# Patient Record
Sex: FEMALE | Race: WHITE | NOT HISPANIC OR LATINO | Employment: FULL TIME | URBAN - METROPOLITAN AREA
[De-identification: names, ages, dates, MRNs, and addresses within clinical notes are randomized per-mention and may not be internally consistent; named-entity substitution may affect disease eponyms.]

---

## 2021-04-16 ENCOUNTER — BULK ORDERING (OUTPATIENT)
Dept: CASE MANAGEMENT | Facility: OTHER | Age: 31
End: 2021-04-16

## 2021-04-16 DIAGNOSIS — Z23 IMMUNIZATION DUE: ICD-10-CM

## 2021-04-29 ENCOUNTER — OFFICE VISIT (OUTPATIENT)
Dept: OBSTETRICS AND GYNECOLOGY | Facility: CLINIC | Age: 31
End: 2021-04-29

## 2021-04-29 VITALS
SYSTOLIC BLOOD PRESSURE: 150 MMHG | HEIGHT: 61 IN | WEIGHT: 120.2 LBS | DIASTOLIC BLOOD PRESSURE: 98 MMHG | BODY MASS INDEX: 22.69 KG/M2

## 2021-04-29 DIAGNOSIS — N64.4 MASTALGIA IN FEMALE: Primary | ICD-10-CM

## 2021-04-29 LAB
B-HCG UR QL: NEGATIVE
INTERNAL NEGATIVE CONTROL: NEGATIVE
INTERNAL POSITIVE CONTROL: POSITIVE
Lab: NORMAL

## 2021-04-29 PROCEDURE — 81025 URINE PREGNANCY TEST: CPT | Performed by: OBSTETRICS & GYNECOLOGY

## 2021-04-29 PROCEDURE — 99203 OFFICE O/P NEW LOW 30 MIN: CPT | Performed by: OBSTETRICS & GYNECOLOGY

## 2021-04-29 NOTE — PROGRESS NOTES
20-minute face-to-face discussion of a 20-minute visit.  Patient is a former patient of mine who I last saw in 2019.  She had a missed spontaneous  at that time.  She is been trying to conceive and recently came off of [place].  She had her first spontaneous menses off of the birth control pill that started on 3/28/2021.  She has had breast tenderness bloating and feels general pregnancy symptoms and is concerned that she may be trying to have another miscarriage.  The urine hCG was negative today.  I explained to her the sensitivity of serum hCG versus urine hCG and she very much wishes to confirm the negative test with a serum hCG.  If is positive we will also check progesterone levels and do serial hCGs as indicated.  She will give her cycle another week to start on its own.  Discussed with her to the possibility of a corpus luteum cyst causing discomfort in her left side that she reports.

## 2021-04-30 LAB — HCG INTACT+B SERPL-ACNC: <0.5 MIU/ML

## 2021-04-30 NOTE — PROGRESS NOTES
Patient wants to know if she can possibly have UTI, now she is having lower left side pain and frequent urination.   I informed patient she could come in and just leave a urine for culture.

## 2021-05-12 ENCOUNTER — LAB (OUTPATIENT)
Dept: OBSTETRICS AND GYNECOLOGY | Facility: CLINIC | Age: 31
End: 2021-05-12

## 2021-05-12 DIAGNOSIS — N64.4 MASTALGIA IN FEMALE: ICD-10-CM

## 2021-05-12 DIAGNOSIS — N64.4 MASTALGIA IN FEMALE: Primary | ICD-10-CM

## 2021-05-13 LAB — HCG INTACT+B SERPL-ACNC: 345.5 MIU/ML

## 2021-05-18 ENCOUNTER — INITIAL PRENATAL (OUTPATIENT)
Dept: OBSTETRICS AND GYNECOLOGY | Facility: CLINIC | Age: 31
End: 2021-05-18

## 2021-05-18 VITALS — DIASTOLIC BLOOD PRESSURE: 82 MMHG | SYSTOLIC BLOOD PRESSURE: 140 MMHG | BODY MASS INDEX: 23.58 KG/M2 | WEIGHT: 124.8 LBS

## 2021-05-18 DIAGNOSIS — Z34.91 UNCERTAIN DATES, ANTEPARTUM, FIRST TRIMESTER: ICD-10-CM

## 2021-05-18 DIAGNOSIS — Z34.91 INITIAL OBSTETRIC VISIT IN FIRST TRIMESTER: ICD-10-CM

## 2021-05-18 DIAGNOSIS — Z34.91 ENCOUNTER FOR PREGNANCY RELATED EXAMINATION IN FIRST TRIMESTER: Primary | ICD-10-CM

## 2021-05-18 LAB
GLUCOSE UR STRIP-MCNC: NEGATIVE MG/DL
PROT UR STRIP-MCNC: NEGATIVE MG/DL

## 2021-05-18 PROCEDURE — 99214 OFFICE O/P EST MOD 30 MIN: CPT | Performed by: OBSTETRICS & GYNECOLOGY

## 2021-05-18 RX ORDER — ONDANSETRON 4 MG/1
4 TABLET, FILM COATED ORAL EVERY 8 HOURS PRN
Qty: 30 TABLET | Refills: 3 | Status: SHIPPED | OUTPATIENT
Start: 2021-05-18 | End: 2022-02-23

## 2021-05-18 RX ORDER — CHOLECALCIFEROL (VITAMIN D3) 50 MCG
1 TABLET ORAL DAILY
Qty: 30 EACH | Refills: 12 | Status: SHIPPED | OUTPATIENT
Start: 2021-05-18 | End: 2023-02-22

## 2021-05-18 NOTE — PROGRESS NOTES
Initial ob visit     CC- Here to initiate prenatal care.    Isabella Christiansen is being seen today for her first obstetrical visit.  She is a 31 y.o. .  She was seen on  after having had a positive home pregnancy test but we did a confirmatory serum hCG and it was negative.  She called back after having not started her menses, and having another positive test a couple weeks later and on 2021 her quantitative hCG was 345.  She is having routine pregnancy symptoms and no bleeding.  She is having nausea and breast tenderness.   Unknown gestation.     # 1 - Date: 07, Sex: Female, Weight: 3204 g (7 lb 1 oz), GA: None, Delivery: , Unspecified, Apgar1: None, Apgar5: None, Living: Living, Birth Comments: None    # 2 - Date: 01/22/10, Sex: Male, Weight: 2608 g (5 lb 12 oz), GA: None, Delivery: , Unspecified, Apgar1: None, Apgar5: None, Living: Living, Birth Comments: None    # 3 - Date: 2019, Sex: None, Weight: None, GA: None, Delivery: None, Apgar1: None, Apgar5: None, Living: None, Birth Comments: None    # 4 - Date: None, Sex: None, Weight: None, GA: None, Delivery: None, Apgar1: None, Apgar5: None, Living: None, Birth Comments: None      Current obstetric complaints : None      Prior obstetric issues, potential pregnancy concerns: 2 prior  sections  Family history of genetic issues (includes FOB): None reported  Prior infections concerning in pregnancy (Rash, fever in last 2 weeks): None reported  Varicella Hx -uncertain  Prior testing for Cystic Fibrosis Carrier or Sickle Cell Trait-none reported  Prepregnancy BMI - Body mass index is 23.58 kg/m².  Hx of HSV for patient or partner : None reported    Past Medical History:   Diagnosis Date   • Asthma        Past Surgical History:   Procedure Laterality Date   • BREAST AUGMENTATION     •  SECTION         No current outpatient medications on file.    Allergies   Allergen Reactions   • Lisinopril Other (See Comments)      shakes   • Sulfamethoxazole-Trimethoprim Other (See Comments)     Migraine   • Ibuprofen Rash   • Morphine Rash     Red and itching        Social History     Socioeconomic History   • Marital status:      Spouse name: Not on file   • Number of children: Not on file   • Years of education: Not on file   • Highest education level: Not on file   Tobacco Use   • Smoking status: Current Some Day Smoker   • Smokeless tobacco: Never Used   Vaping Use   • Vaping Use: Never used   Substance and Sexual Activity   • Alcohol use: Not Currently   • Drug use: Never   • Sexual activity: Yes     Partners: Male     Birth control/protection: None       Family History   Problem Relation Age of Onset   • Diabetes Father    • Hypertension Father        Review of systems     Constitutional : Nausea, fatigue present   : Vaginal bleeding, cramping negative  Breast Tenderness : Positive  All other systems reviewed and are negative       Objective    /82   Wt 56.6 kg (124 lb 12.8 oz)   LMP 03/28/2021   BMI 23.58 kg/m²       General Appearance:    Alert, cooperative, in no acute distress, habitus normal   Head:    Normocephalic, without obvious abnormality, atraumatic   Eyes:            Lids and lashes normal, conjunctivae and sclerae normal, no   icterus, no pallor, corneas clear   Ears:    Ears appear intact with no abnormalities noted       Neck:   no thyromegaly, no mass.   Back:                         Lungs:     Clear to auscultation,respirations regular, even and     unlabored    Heart:    Regular rhythm and normal rate, normal S1 and S2, no            murmur, no gallop, no rub, no click   Breast Exam:    No masses, No nipple discharge   Abdomen:     Normal bowel sounds, no masses, no organomegaly, soft        non-tender, non-distended, no guarding, no rebound                 tenderness   Genitalia:    Vulva - BUS-WNL, NEFG    Vagina - No discharge, No bleeding    Cervix - No Lesions, closed     Uterus - Consistent  with 6 weeks    Adnexa - No mass, NT    Pelvimetry - clinically adequate, gynecoid pelvis     Extremities:   Moves all extremities well, no edema, no cyanosis, no              redness   Pulses:   Pulses palpable and equal bilaterally   Skin:   No bleeding, bruising or rash   Lymph nodes:   No palpable adenopathy   Neurologic:   Sensation intact, A&O times 3      Assessment & Plan     Diagnoses and all orders for this visit:    1. Encounter for pregnancy related examination in first trimester (Primary)  -     Urine Culture - Urine, Urine, Clean Catch  -     NuSwab VG+ - Swab, Vagina  -     OB Panel With HIV  -     Inheritest Core(CF97,SMA,FraX) - Blood,  -     Drug Profile Urine - 9 Drugs - Urine, Clean Catch    2. Initial obstetric visit in first trimester    3. Uncertain dates, antepartum, first trimester  -     US Ob Transvaginal; Future        1) Pregnancy at Unknown gestational age.  Given her unusual menstrual history with negative hCGs and then early positive hCG will repeat a quantitative hCG today and plan for an ultrasound in 2 days.  2) prior  section x2.  We will plan for repeat          Activity recommendation : 150 minutes/week of moderate intensity aerobic activity unless we limit for bleeding, hypertension or other pregnancy complication   Patient is on Prenatal vitamins  Problem list reviewed and updated.  Reviewed routine prenatal care with the office to include but not limited to   Zika (travel restrictions/ok to use insect repellant), not to changing cat litter, food restrictions, avoidance of alcohol, tobacco and drugs and saunas/hot tubs.   All questions answered.     Jeffery Cooper MD   2021  15:37 EDT

## 2021-05-19 LAB
ABO GROUP BLD: ABNORMAL
AMPHETAMINES UR QL SCN: NEGATIVE NG/ML
BARBITURATES UR QL SCN: NEGATIVE NG/ML
BASOPHILS # BLD AUTO: 0 X10E3/UL (ref 0–0.2)
BASOPHILS NFR BLD AUTO: 0 %
BENZODIAZ UR QL: NEGATIVE NG/ML
BLD GP AB SCN SERPL QL: NEGATIVE
BZE UR QL: NEGATIVE NG/ML
CANNABINOIDS UR QL SCN: NEGATIVE NG/ML
EOSINOPHIL # BLD AUTO: 0.2 X10E3/UL (ref 0–0.4)
EOSINOPHIL NFR BLD AUTO: 2 %
ERYTHROCYTE [DISTWIDTH] IN BLOOD BY AUTOMATED COUNT: 11.6 % (ref 11.7–15.4)
HBV SURFACE AG SERPL QL IA: NEGATIVE
HCT VFR BLD AUTO: 45.5 % (ref 34–46.6)
HCV AB S/CO SERPL IA: <0.1 S/CO RATIO (ref 0–0.9)
HGB BLD-MCNC: 15.6 G/DL (ref 11.1–15.9)
HIV 1+2 AB+HIV1 P24 AG SERPL QL IA: NON REACTIVE
IMM GRANULOCYTES # BLD AUTO: 0.1 X10E3/UL (ref 0–0.1)
IMM GRANULOCYTES NFR BLD AUTO: 1 %
LYMPHOCYTES # BLD AUTO: 2.5 X10E3/UL (ref 0.7–3.1)
LYMPHOCYTES NFR BLD AUTO: 27 %
MCH RBC QN AUTO: 30.4 PG (ref 26.6–33)
MCHC RBC AUTO-ENTMCNC: 34.3 G/DL (ref 31.5–35.7)
MCV RBC AUTO: 89 FL (ref 79–97)
METHADONE UR QL SCN: NEGATIVE NG/ML
MONOCYTES # BLD AUTO: 0.8 X10E3/UL (ref 0.1–0.9)
MONOCYTES NFR BLD AUTO: 8 %
NEUTROPHILS # BLD AUTO: 5.7 X10E3/UL (ref 1.4–7)
NEUTROPHILS NFR BLD AUTO: 62 %
OPIATES UR QL: NEGATIVE NG/ML
PCP UR QL: NEGATIVE NG/ML
PLATELET # BLD AUTO: 336 X10E3/UL (ref 150–450)
PROPOXYPH UR QL SCN: NEGATIVE NG/ML
RBC # BLD AUTO: 5.13 X10E6/UL (ref 3.77–5.28)
RH BLD: POSITIVE
RPR SER QL: NON REACTIVE
RUBV IGG SERPL IA-ACNC: 14.3 INDEX
WBC # BLD AUTO: 9.3 X10E3/UL (ref 3.4–10.8)

## 2021-05-20 LAB
BACTERIA UR CULT: NO GROWTH
BACTERIA UR CULT: NORMAL
HCG INTACT+B SERPL-ACNC: 3183 MIU/ML

## 2021-05-21 ENCOUNTER — TELEPHONE (OUTPATIENT)
Dept: OBSTETRICS AND GYNECOLOGY | Facility: CLINIC | Age: 31
End: 2021-05-21

## 2021-05-21 LAB
A VAGINAE DNA VAG QL NAA+PROBE: ABNORMAL SCORE
BVAB2 DNA VAG QL NAA+PROBE: ABNORMAL SCORE
C ALBICANS DNA VAG QL NAA+PROBE: NEGATIVE
C GLABRATA DNA VAG QL NAA+PROBE: NEGATIVE
C TRACH DNA VAG QL NAA+PROBE: NEGATIVE
HCG INTACT+B SERPL-ACNC: NORMAL MIU/ML
MEGA1 DNA VAG QL NAA+PROBE: ABNORMAL SCORE
N GONORRHOEA DNA VAG QL NAA+PROBE: NEGATIVE
T VAGINALIS DNA VAG QL NAA+PROBE: NEGATIVE

## 2021-05-24 ENCOUNTER — TELEPHONE (OUTPATIENT)
Dept: OBSTETRICS AND GYNECOLOGY | Facility: CLINIC | Age: 31
End: 2021-05-24

## 2021-05-24 LAB — SPECIMEN STATUS: NORMAL

## 2021-05-24 NOTE — TELEPHONE ENCOUNTER
Good morning,  Pt is calling and states that the swelling in her feet and ankles that has gotten a lot  worse while she's working, wasn't sure if there was anything you recommend to help with that. Please advise.  Thank you,  Rosamaria

## 2021-05-24 NOTE — TELEPHONE ENCOUNTER
Spoke with patient regarding her swelling. Patient to increase fluid intake and elevated feet when possible. She has an appt on Friday.

## 2021-06-03 ENCOUNTER — ROUTINE PRENATAL (OUTPATIENT)
Dept: OBSTETRICS AND GYNECOLOGY | Facility: CLINIC | Age: 31
End: 2021-06-03

## 2021-06-03 VITALS — DIASTOLIC BLOOD PRESSURE: 78 MMHG | SYSTOLIC BLOOD PRESSURE: 124 MMHG | BODY MASS INDEX: 23.81 KG/M2 | WEIGHT: 126 LBS

## 2021-06-03 DIAGNOSIS — Z3A.01 7 WEEKS GESTATION OF PREGNANCY: ICD-10-CM

## 2021-06-03 DIAGNOSIS — Z34.81 PRENATAL CARE, SUBSEQUENT PREGNANCY IN FIRST TRIMESTER: Primary | ICD-10-CM

## 2021-06-03 LAB
GLUCOSE UR STRIP-MCNC: NEGATIVE MG/DL
PROT UR STRIP-MCNC: NEGATIVE MG/DL

## 2021-06-03 PROCEDURE — 99212 OFFICE O/P EST SF 10 MIN: CPT | Performed by: OBSTETRICS & GYNECOLOGY

## 2021-06-03 NOTE — PROGRESS NOTES
CC: Prenatal follow-up visit.  Patient is doing well overall she had a few vagal episodes at work but denies any bleeding or cramping.  All of her lab work is reviewed and values are negative and her blood type is AB+.  She is still taking Zofran periodically.  Assessment/ plan: 7-week gestation.  We will plan visit in 4 weeks and listen for heart tones on that visit.  We will also do NIPT on that visit.

## 2021-06-04 LAB
CLINICAL INFO: NORMAL
ETHNIC BACKGROUND STATED: NORMAL
GENE MUT TESTED BLD/T: NORMAL
GENETIC COUNSELOR:: NORMAL
LAB DIRECTOR NAME PROVIDER: NORMAL
MOL DX INTERP BLD/T QL: NORMAL
REASON FOR REFERRAL (NARRATIVE): NORMAL
REF LAB TEST METHOD: NORMAL
SERVICE CMNT 02-IMP: NORMAL
SPECIMEN SOURCE: NORMAL
TEST PERFORMANCE INFO SPEC: NORMAL

## 2021-06-16 ENCOUNTER — TELEPHONE (OUTPATIENT)
Dept: OBSTETRICS AND GYNECOLOGY | Facility: CLINIC | Age: 31
End: 2021-06-16

## 2021-06-16 NOTE — TELEPHONE ENCOUNTER
Patient called and states that she has been having cramping for the last few days and sometimes it is more intense then others. She has had a miscarriage in past so in concerned wanted to know if that would be normal for her or should she come into office for evaluation?

## 2021-06-16 NOTE — TELEPHONE ENCOUNTER
Have patient drink lots of fluids, she can rest when possible.   If no bleeding keep her appointment on the 30th.

## 2021-06-29 ENCOUNTER — ROUTINE PRENATAL (OUTPATIENT)
Dept: OBSTETRICS AND GYNECOLOGY | Facility: CLINIC | Age: 31
End: 2021-06-29

## 2021-06-29 VITALS — BODY MASS INDEX: 23.85 KG/M2 | SYSTOLIC BLOOD PRESSURE: 120 MMHG | WEIGHT: 126.2 LBS | DIASTOLIC BLOOD PRESSURE: 62 MMHG

## 2021-06-29 DIAGNOSIS — Z3A.10 10 WEEKS GESTATION OF PREGNANCY: Primary | ICD-10-CM

## 2021-06-29 DIAGNOSIS — Z34.81 PRENATAL CARE, SUBSEQUENT PREGNANCY IN FIRST TRIMESTER: ICD-10-CM

## 2021-06-29 LAB
GLUCOSE UR STRIP-MCNC: NEGATIVE MG/DL
PROT UR STRIP-MCNC: NEGATIVE MG/DL

## 2021-06-29 PROCEDURE — 99213 OFFICE O/P EST LOW 20 MIN: CPT | Performed by: OBSTETRICS & GYNECOLOGY

## 2021-06-29 NOTE — PROGRESS NOTES
OB follow up     Isabella Christiansen is a 31 y.o.  10w6d being seen today for her obstetrical visit.  Patient reports no complaints and Some uterine cramping but no bleeding.. Fetal movement: To early.    Her prenatal care is complicated by (and status): Prior  x2    Review of Systems  Cramping/contractions : Mild cramping noted  Vaginal bleeding: Negative  Fetal movement too early    /62   Wt 57.2 kg (126 lb 3.2 oz)   LMP 2021   BMI 23.85 kg/m²     FHT: 170 BPM   Uterine Size: size equals dates       Assessment    Diagnoses and all orders for this visit:    1. 10 weeks gestation of pregnancy (Primary)  -     [id number] PLUS Core - Blood,    2. Prenatal care, subsequent pregnancy in first trimester        1) pregnancy at 10w6d   2) prior  and plan for repeat .  Will get NIPT today.        Plan    Reviewed this stage of pregnancy  Problem list updated   Follow up in 4 weeks    Jeffery Cooper MD   2021  15:14 EDT

## 2021-07-09 ENCOUNTER — TELEPHONE (OUTPATIENT)
Dept: OBSTETRICS AND GYNECOLOGY | Facility: CLINIC | Age: 31
End: 2021-07-09

## 2021-07-09 LAB
CFDNA.FET/CFDNA.TOTAL SFR FETUS: NORMAL %
CITATION REF LAB TEST: NORMAL
FET 13+18+21+X+Y ANEUP PLAS.CFDNA: NEGATIVE
FET CHR 21 TS PLAS.CFDNA QL: NEGATIVE
FET SEX PLAS.CFDNA DOSAGE CFDNA: NORMAL
FET TS 13 RISK PLAS.CFDNA QL: NEGATIVE
FET TS 18 RISK WBC.DNA+CFDNA QL: NEGATIVE
GA EST FROM CONCEPTION DATE: NORMAL D
GESTATIONAL AGE > 9:: YES
LAB DIRECTOR NAME PROVIDER: NORMAL
LAB DIRECTOR NAME PROVIDER: NORMAL
LABORATORY COMMENT REPORT: NORMAL
LIMITATIONS OF THE TEST: NORMAL
NEGATIVE PREDICTIVE VALUE: NORMAL
NOTE: NORMAL
PERFORMANCE CHARACTERISTICS: NORMAL
POSITIVE PREDICTIVE VALUE: NORMAL
REF LAB TEST METHOD: NORMAL
TEST PERFORMANCE INFO SPEC: NORMAL

## 2021-07-09 NOTE — TELEPHONE ENCOUNTER
Patient returned call, Haley wanted to know if her lab results were in. Informed patient that results are not back yet and this can take up to 2 weeks.

## 2021-07-09 NOTE — TELEPHONE ENCOUNTER
Patient called in regards to lab results and wants a call back. She would not share any further information with me.

## 2021-07-27 ENCOUNTER — ROUTINE PRENATAL (OUTPATIENT)
Dept: OBSTETRICS AND GYNECOLOGY | Facility: CLINIC | Age: 31
End: 2021-07-27

## 2021-07-27 VITALS — DIASTOLIC BLOOD PRESSURE: 70 MMHG | BODY MASS INDEX: 24.53 KG/M2 | SYSTOLIC BLOOD PRESSURE: 132 MMHG | WEIGHT: 129.8 LBS

## 2021-07-27 DIAGNOSIS — Z3A.14 14 WEEKS GESTATION OF PREGNANCY: ICD-10-CM

## 2021-07-27 DIAGNOSIS — Z34.82 PRENATAL CARE, SUBSEQUENT PREGNANCY IN SECOND TRIMESTER: Primary | ICD-10-CM

## 2021-07-27 LAB
GLUCOSE UR STRIP-MCNC: NEGATIVE MG/DL
PROT UR STRIP-MCNC: NEGATIVE MG/DL

## 2021-07-27 PROCEDURE — 99212 OFFICE O/P EST SF 10 MIN: CPT | Performed by: OBSTETRICS & GYNECOLOGY

## 2021-07-27 NOTE — PROGRESS NOTES
OB follow up     Isabella Christiansen is a 31 y.o.  14w6d being seen today for her obstetrical visit.  Patient reports no complaints. Fetal movement: normal.    Her prenatal care is complicated by (and status): Prior  x2    Review of Systems  Cramping/contractions : Negative  Vaginal bleeding: Not reported  Fetal movement early but present    /70   Wt 58.9 kg (129 lb 12.8 oz)   LMP 2021   BMI 24.53 kg/m²     FHT:  158 BPM   Uterine Size: size equals dates       Assessment    Diagnoses and all orders for this visit:    1. Prenatal care, subsequent pregnancy in second trimester (Primary)    2. 14 weeks gestation of pregnancy        1) pregnancy at 14w6d.  NIPT normal and reviewed with patient.  We will plan visit in 4 weeks and then plan her ultrasound a little after that visit.  2) inconsistent evidence of mild hypertension with no proteinuria.  Discussed with patient and will follow carefully through the pregnancy.        Plan    Reviewed this stage of pregnancy  Problem list updated   Follow up in 4 weeks    Jeffery Cooper MD   2021  09:28 EDT

## 2021-08-26 ENCOUNTER — ROUTINE PRENATAL (OUTPATIENT)
Dept: OBSTETRICS AND GYNECOLOGY | Facility: CLINIC | Age: 31
End: 2021-08-26

## 2021-08-26 VITALS — BODY MASS INDEX: 24.94 KG/M2 | WEIGHT: 132 LBS | DIASTOLIC BLOOD PRESSURE: 81 MMHG | SYSTOLIC BLOOD PRESSURE: 122 MMHG

## 2021-08-26 DIAGNOSIS — Z98.891 HISTORY OF C-SECTION: ICD-10-CM

## 2021-08-26 DIAGNOSIS — Z34.82 PRENATAL CARE, SUBSEQUENT PREGNANCY IN SECOND TRIMESTER: Primary | ICD-10-CM

## 2021-08-26 DIAGNOSIS — Z3A.19 19 WEEKS GESTATION OF PREGNANCY: ICD-10-CM

## 2021-08-26 LAB
GLUCOSE UR STRIP-MCNC: NEGATIVE MG/DL
PROT UR STRIP-MCNC: NEGATIVE MG/DL

## 2021-08-26 PROCEDURE — 99212 OFFICE O/P EST SF 10 MIN: CPT | Performed by: OBSTETRICS & GYNECOLOGY

## 2021-08-26 NOTE — PROGRESS NOTES
OB follow up     Isabella Christiansen is a 31 y.o.  19w1d being seen today for her obstetrical visit.  Patient reports no complaints. Fetal movement: normal.    Her prenatal care is complicated by (and status): 2 prior  sections    Review of Systems  Cramping/contractions : Negative  Vaginal bleeding: None reported  Fetal movement normal    /81   Wt 59.9 kg (132 lb)   LMP 2021   BMI 24.94 kg/m²     FHT:  150s BPM   Uterine Size: size equals dates       Assessment    Diagnoses and all orders for this visit:    1. Prenatal care, subsequent pregnancy in second trimester (Primary)    2. 19 weeks gestation of pregnancy    3. History of         1) pregnancy at 19w1d   2) prior  x2.  Posterior low-lying placenta seen on ultrasound will repeat on later ultrasound.  Plan is for repeat .  Covid vaccine discussed and patient is reluctant to get this as she had Covid in 2020.        Plan    Reviewed this stage of pregnancy  Problem list updated   Follow up in 5 weeks.  We will get 1 hour glucose on next visit.    Jeffery Cooper MD   2021  11:22 EDT

## 2021-08-31 ENCOUNTER — TELEPHONE (OUTPATIENT)
Dept: OBSTETRICS AND GYNECOLOGY | Facility: CLINIC | Age: 31
End: 2021-08-31

## 2021-08-31 RX ORDER — ALBUTEROL SULFATE 2.5 MG/3ML
2.5 SOLUTION RESPIRATORY (INHALATION) EVERY 4 HOURS PRN
Qty: 3 ML | Refills: 3 | OUTPATIENT
Start: 2021-08-31 | End: 2021-09-01

## 2021-08-31 RX ORDER — AZITHROMYCIN 250 MG/1
TABLET, FILM COATED ORAL
Qty: 4 TABLET | Refills: 0 | Status: SHIPPED | OUTPATIENT
Start: 2021-08-31 | End: 2021-09-05

## 2021-08-31 NOTE — TELEPHONE ENCOUNTER
Dr. Cooper,    I do not see an inhaler on Haley's med list. Would you like to call her in an antibiotic for her ear pain and also an inhaler? I will be giving her a call this afternoon.    Yuridia

## 2021-09-20 ENCOUNTER — TELEPHONE (OUTPATIENT)
Dept: OBSTETRICS AND GYNECOLOGY | Facility: CLINIC | Age: 31
End: 2021-09-20

## 2021-09-20 NOTE — TELEPHONE ENCOUNTER
Patient called and states that she was suppose to come in today for a follow up because she fell the other day. She states she cant make it at time provided she can only come in at 4:30 or later and I informed her we dont see patients that late and she wants to get a call back on what she should do.

## 2021-09-20 NOTE — TELEPHONE ENCOUNTER
Patient states, just slipped on hardwood floor yesterday, caught self so went down on knees. Patient states baby is not very active since she fell. Wants to know what what she should do? States movements are once every 3 to 4 hours.

## 2021-09-21 ENCOUNTER — ROUTINE PRENATAL (OUTPATIENT)
Dept: OBSTETRICS AND GYNECOLOGY | Facility: CLINIC | Age: 31
End: 2021-09-21

## 2021-09-21 VITALS — SYSTOLIC BLOOD PRESSURE: 126 MMHG | WEIGHT: 138.2 LBS | DIASTOLIC BLOOD PRESSURE: 70 MMHG | BODY MASS INDEX: 26.11 KG/M2

## 2021-09-21 DIAGNOSIS — Z98.891 HISTORY OF C-SECTION: ICD-10-CM

## 2021-09-21 DIAGNOSIS — Z34.82 PRENATAL CARE, SUBSEQUENT PREGNANCY IN SECOND TRIMESTER: Primary | ICD-10-CM

## 2021-09-21 DIAGNOSIS — Z3A.22 22 WEEKS GESTATION OF PREGNANCY: ICD-10-CM

## 2021-09-21 LAB
GLUCOSE UR STRIP-MCNC: NEGATIVE MG/DL
PROT UR STRIP-MCNC: NEGATIVE MG/DL

## 2021-09-21 PROCEDURE — 99212 OFFICE O/P EST SF 10 MIN: CPT | Performed by: OBSTETRICS & GYNECOLOGY

## 2021-09-21 NOTE — PROGRESS NOTES
Patient had a fall, caught herself and fell on knees. Has been worried since then due to baby not moving as much.

## 2021-09-21 NOTE — PROGRESS NOTES
OB follow up     Isabella Christiansen is a 31 y.o.  22w6d being seen today for her obstetrical visit.  Patient reports no complaints. Fetal movement: decreased.  Patient states that she had a fall onto her knees after slipping at home for 5 days ago and was concerned and called the office yesterday that there was decreased movement.  She denies any bleeding or direct trauma onto the uterus.  She denies loss of fluid that would be concerning for amniotic fluid.  Covid vaccine encouraged per ACOG and CDC recommendations.    Her prenatal care is complicated by (and status): Prior  x2    Review of Systems  Cramping/contractions : None reported  Vaginal bleeding: None reported  Fetal movement is normal for 22 weeks    /70   Wt 62.7 kg (138 lb 3.2 oz)   LMP 2021   BMI 26.11 kg/m²     FHT:  150s BPM   Uterine Size: size equals dates       Assessment    Diagnoses and all orders for this visit:    1. Prenatal care, subsequent pregnancy in second trimester (Primary)    2. 22 weeks gestation of pregnancy        1) pregnancy at 22w6d   2) prior  section x2.  We will plan for repeat         Plan    Reviewed this stage of pregnancy  Problem list updated   Follow up in 2 weeks at her next scheduled visit for her 1 hour glucose.  Will move to light duty at work.    Jeffery Cooper MD   2021  09:43 EDT

## 2021-09-30 ENCOUNTER — ROUTINE PRENATAL (OUTPATIENT)
Dept: OBSTETRICS AND GYNECOLOGY | Facility: CLINIC | Age: 31
End: 2021-09-30

## 2021-09-30 VITALS — BODY MASS INDEX: 25.7 KG/M2 | WEIGHT: 136 LBS | DIASTOLIC BLOOD PRESSURE: 70 MMHG | SYSTOLIC BLOOD PRESSURE: 138 MMHG

## 2021-09-30 DIAGNOSIS — R30.0 DYSURIA: Primary | ICD-10-CM

## 2021-09-30 DIAGNOSIS — Z3A.24 24 WEEKS GESTATION OF PREGNANCY: ICD-10-CM

## 2021-09-30 DIAGNOSIS — N30.01 ACUTE CYSTITIS WITH HEMATURIA: ICD-10-CM

## 2021-09-30 DIAGNOSIS — Z98.891 HISTORY OF C-SECTION: ICD-10-CM

## 2021-09-30 DIAGNOSIS — Z34.82 PRENATAL CARE, SUBSEQUENT PREGNANCY IN SECOND TRIMESTER: ICD-10-CM

## 2021-09-30 LAB
BILIRUB BLD-MCNC: NEGATIVE MG/DL
CLARITY, POC: ABNORMAL
COLOR UR: YELLOW
GLUCOSE 1H P 50 G GLC PO SERPL-MCNC: 91 MG/DL (ref 65–139)
GLUCOSE UR STRIP-MCNC: NEGATIVE MG/DL
GLUCOSE UR STRIP-MCNC: NEGATIVE MG/DL
HCT VFR BLD AUTO: 39.6 % (ref 34–46.6)
HGB BLD-MCNC: 13.3 G/DL (ref 12–15.9)
KETONES UR QL: NEGATIVE
LEUKOCYTE EST, POC: ABNORMAL
NITRITE UR-MCNC: NEGATIVE MG/ML
PH UR: 7.5 [PH] (ref 5–8)
PROT UR STRIP-MCNC: ABNORMAL MG/DL
PROT UR STRIP-MCNC: ABNORMAL MG/DL
RBC # UR STRIP: ABNORMAL /UL
SP GR UR: 1.02 (ref 1–1.03)
UROBILINOGEN UR QL: NORMAL

## 2021-09-30 PROCEDURE — 99214 OFFICE O/P EST MOD 30 MIN: CPT | Performed by: OBSTETRICS & GYNECOLOGY

## 2021-09-30 RX ORDER — NITROFURANTOIN 25; 75 MG/1; MG/1
100 CAPSULE ORAL 2 TIMES DAILY
Qty: 14 CAPSULE | Refills: 0 | Status: SHIPPED | OUTPATIENT
Start: 2021-09-30 | End: 2021-10-07

## 2021-09-30 NOTE — PROGRESS NOTES
OB follow up     Isabella Christiansen is a 31 y.o.  24w1d being seen today for her obstetrical visit.  Patient reports Dysuria. Fetal movement: normal.    Her prenatal care is complicated by (and status): Prior  x2    Review of Systems  Cramping/contractions : None reported  Vaginal bleeding: Negative  Fetal movement negative  Positive for dysuria  /70   Wt 61.7 kg (136 lb)   LMP 2021   BMI 25.70 kg/m²     FHT: 140s BPM   Uterine Size: size equals dates       Assessment    Diagnoses and all orders for this visit:    1. Dysuria (Primary)  -     POC Urinalysis Dipstick  -     Urine Culture - Urine, Urine, Clean Catch    2. Prenatal care, subsequent pregnancy in second trimester    3. 24 weeks gestation of pregnancy  -     Gestational Screen 1 Hr ([facility])  -     Hemoglobin & Hematocrit, Blood    4. History of     5. Acute cystitis with hematuria    Other orders  -     nitrofurantoin, macrocrystal-monohydrate, (Macrobid) 100 MG capsule; Take 1 capsule by mouth 2 (Two) Times a Day for 7 days.  Dispense: 14 capsule; Refill: 0        1) pregnancy at 24w1d         Plan    Reviewed this stage of pregnancy  Problem list updated   Follow up in 4 weeks.  1 hour glucose today.  Will prescribe Macrobid for acute cystitis.    Jeffery Cooper MD   2021  10:11 EDT

## 2021-10-02 LAB
BACTERIA UR CULT: NORMAL
BACTERIA UR CULT: NORMAL

## 2021-10-23 ENCOUNTER — TELEPHONE (OUTPATIENT)
Dept: OBSTETRICS AND GYNECOLOGY | Facility: CLINIC | Age: 31
End: 2021-10-23

## 2021-10-23 NOTE — TELEPHONE ENCOUNTER
"Patient called stating that she had \"severe cramping\" overnight, went to sleep and woke up with back pain and 4/10 lower abdominal cramping, noted some blood when wiping. She recently finished Rx for Macrobid.  She has a posterior low lying placenta. Notes normal fetal movements. About to go to work at [employer] and wondering if she should go to work and come in after. Recommend patient come in to triage for evaluation prior to going to work. She agrees.   "

## 2021-10-25 ENCOUNTER — ROUTINE PRENATAL (OUTPATIENT)
Dept: OBSTETRICS AND GYNECOLOGY | Facility: CLINIC | Age: 31
End: 2021-10-25

## 2021-10-25 ENCOUNTER — TELEPHONE (OUTPATIENT)
Dept: OBSTETRICS AND GYNECOLOGY | Facility: CLINIC | Age: 31
End: 2021-10-25

## 2021-10-25 VITALS — DIASTOLIC BLOOD PRESSURE: 72 MMHG | BODY MASS INDEX: 26.04 KG/M2 | SYSTOLIC BLOOD PRESSURE: 130 MMHG | WEIGHT: 137.8 LBS

## 2021-10-25 DIAGNOSIS — Z34.83 PRENATAL CARE, SUBSEQUENT PREGNANCY IN THIRD TRIMESTER: Primary | ICD-10-CM

## 2021-10-25 DIAGNOSIS — Z3A.27 27 WEEKS GESTATION OF PREGNANCY: ICD-10-CM

## 2021-10-25 DIAGNOSIS — Z98.891 HISTORY OF C-SECTION: ICD-10-CM

## 2021-10-25 LAB
GLUCOSE UR STRIP-MCNC: NEGATIVE MG/DL
PROT UR STRIP-MCNC: ABNORMAL MG/DL

## 2021-10-25 PROCEDURE — 99213 OFFICE O/P EST LOW 20 MIN: CPT | Performed by: OBSTETRICS & GYNECOLOGY

## 2021-10-25 NOTE — TELEPHONE ENCOUNTER
Called pharmacy to confirm that script is for inhaler, inhaler is correct and patient picked up on 9/1/21. I did give her 1 refill on inhaler today.     Left voicemail on Ms. Christiansen's voicemail.

## 2021-10-25 NOTE — TELEPHONE ENCOUNTER
Prescription was sent in on 9/1/21 and was for an inhaler. She needs to call her pharmacy and speak with them. If not, her pharmacy can call us.     Do you want me to call her?

## 2021-10-25 NOTE — PROGRESS NOTES
OB follow up     Isabella Christiansen is a 31 y.o.  27w5d being seen today for her obstetrical visit.  Patient reports Some lower extremity edema mainly associated during and after work. Fetal movement: normal.    Her prenatal care is complicated by (and status): Prior  section x2    Review of Systems  Cramping/contractions : None reported  Vaginal bleeding: Negative  Fetal movement normal and active    /72   Wt 62.5 kg (137 lb 12.8 oz)   LMP 2021   BMI 26.04 kg/m²     FHT:  140s BPM   Uterine Size: size equals dates       Assessment    Diagnoses and all orders for this visit:    1. Prenatal care, subsequent pregnancy in third trimester (Primary)    2. 27 weeks gestation of pregnancy    3. History of         1) pregnancy at 27w5d         Plan    Reviewed this stage of pregnancy  Problem list updated   Follow up in 3 weeks.  We will plan to see in 3 weeks and then 2 weeks after that with growth ultrasound.  Timing of  would be 39 weeks which would be on or after 2022.  I spent 20 minutes caring for Isabella on this date of service. This time includes time spent by me in the following activities: preparing for the visit, reviewing tests, obtaining and/or reviewing a separately obtained history, performing a medically appropriate examination and/or evaluation, counseling and educating the patient/family/caregiver and documenting information in the medical record      Jeffery Cooper MD   10/25/2021  15:42 EDT

## 2021-10-25 NOTE — TELEPHONE ENCOUNTER
Please if you don't mind. I have given her this information on two different days and she is saying that the RX is wrong.

## 2021-11-04 ENCOUNTER — ROUTINE PRENATAL (OUTPATIENT)
Dept: OBSTETRICS AND GYNECOLOGY | Facility: CLINIC | Age: 31
End: 2021-11-04

## 2021-11-04 ENCOUNTER — TELEPHONE (OUTPATIENT)
Dept: OBSTETRICS AND GYNECOLOGY | Facility: CLINIC | Age: 31
End: 2021-11-04

## 2021-11-04 VITALS — DIASTOLIC BLOOD PRESSURE: 68 MMHG | BODY MASS INDEX: 26.6 KG/M2 | SYSTOLIC BLOOD PRESSURE: 110 MMHG | WEIGHT: 140.8 LBS

## 2021-11-04 DIAGNOSIS — Z3A.29 29 WEEKS GESTATION OF PREGNANCY: ICD-10-CM

## 2021-11-04 DIAGNOSIS — Z34.83 PRENATAL CARE, SUBSEQUENT PREGNANCY IN THIRD TRIMESTER: Primary | ICD-10-CM

## 2021-11-04 DIAGNOSIS — Z98.891 HISTORY OF C-SECTION: ICD-10-CM

## 2021-11-04 LAB
GLUCOSE UR STRIP-MCNC: NEGATIVE MG/DL
PROT UR STRIP-MCNC: NEGATIVE MG/DL

## 2021-11-04 PROCEDURE — 99213 OFFICE O/P EST LOW 20 MIN: CPT | Performed by: OBSTETRICS & GYNECOLOGY

## 2021-11-04 NOTE — TELEPHONE ENCOUNTER
Good morning,  Patient is calling to inform MA and provider that she lost some clear fluid yesterday. Patient stated that it wasn't a whole lot, about a half dollar size. Patient also stated that she is having [place] contractions some are mild and a few are very strong. Patient denies active leaking of fluid, contractions, cramps or bleeding.   Patient would like to know if she should come into the office to be seen today or just monitor this.  Patient also stated that she would like a call back from the MA if possible because she has a few questions about being tired and unable to stay awake at times.      Please advise,  Thank you

## 2021-11-04 NOTE — PROGRESS NOTES
OB follow up     Isabella Christiansen is a 31 y.o.  29w1d being seen today for her obstetrical visit.  Patient reports no complaints. Fetal movement: normal.  She does report that she had an episode of fluid leakage yesterday but has had no continuous leaking since then.  She denies any bleeding.    Her prenatal care is complicated by (and status): Prior  x2    Review of Systems  Cramping/contractions : More noticeable recently  Vaginal bleeding: None  Fetal movement normal and active    /68   Wt 63.9 kg (140 lb 12.8 oz)   LMP 2021   BMI 26.60 kg/m²     FHT:  160 BPM   Uterine Size: size equals dates       Assessment    Diagnoses and all orders for this visit:    1. Prenatal care, subsequent pregnancy in third trimester (Primary)    2. 29 weeks gestation of pregnancy    3. History of         1) pregnancy at 29w1d.  No evidence of ruptured membranes on sterile speculum exam.  Discussed signs and symptoms of membrane rupture and can reevaluate as needed.  We will keep her next scheduled appointment.  I spent 20 minutes caring for Isabella on this date of service. This time includes time spent by me in the following activities: preparing for the visit, reviewing tests, obtaining and/or reviewing a separately obtained history, performing a medically appropriate examination and/or evaluation and documenting information in the medical record          Plan    Reviewed this stage of pregnancy  Problem list updated   Follow up in 3 weeks.  Sterile speculum exam done.    Jeffery Cooper MD   2021  11:24 EDT

## 2021-11-15 ENCOUNTER — ROUTINE PRENATAL (OUTPATIENT)
Dept: OBSTETRICS AND GYNECOLOGY | Facility: CLINIC | Age: 31
End: 2021-11-15

## 2021-11-15 VITALS — DIASTOLIC BLOOD PRESSURE: 62 MMHG | SYSTOLIC BLOOD PRESSURE: 110 MMHG | WEIGHT: 144.4 LBS | BODY MASS INDEX: 27.28 KG/M2

## 2021-11-15 DIAGNOSIS — Z3A.30 30 WEEKS GESTATION OF PREGNANCY: ICD-10-CM

## 2021-11-15 DIAGNOSIS — O99.333 MATERNAL TOBACCO USE IN THIRD TRIMESTER: ICD-10-CM

## 2021-11-15 DIAGNOSIS — Z34.83 PRENATAL CARE, SUBSEQUENT PREGNANCY IN THIRD TRIMESTER: Primary | ICD-10-CM

## 2021-11-15 LAB
GLUCOSE UR STRIP-MCNC: NEGATIVE MG/DL
PROT UR STRIP-MCNC: NEGATIVE MG/DL

## 2021-11-15 PROCEDURE — 99213 OFFICE O/P EST LOW 20 MIN: CPT | Performed by: OBSTETRICS & GYNECOLOGY

## 2021-11-15 NOTE — PROGRESS NOTES
OB follow up     Isabella Christiansen is a 31 y.o.  30w5d being seen today for her obstetrical visit.  Patient reports no complaints. Fetal movement: normal.  No bleeding and no other unusual discharge.  Normal fetal movement.  She did have some episodic contractions last evening but nothing that has persisted.    Her prenatal care is complicated by (and status): Prior  x2    Review of Systems  Cramping/contractions : Very occasional  Vaginal bleeding: None reported  Fetal movement normal    /62   Wt 65.5 kg (144 lb 6.4 oz)   LMP 2021   BMI 27.28 kg/m²     FHT:  150s BPM   Uterine Size: size equals dates       Assessment    Diagnoses and all orders for this visit:    1. Prenatal care, subsequent pregnancy in third trimester (Primary)    2. 30 weeks gestation of pregnancy  -     US Ob Follow Up Transabdominal Approach; Future    3. Maternal tobacco use in third trimester  -     US Ob Follow Up Transabdominal Approach; Future        1) pregnancy at 30w5d         Plan    Reviewed this stage of pregnancy  Problem list updated   Follow up in 2 weeks we will work on scheduling  for 2021.  We will plan for growth ultrasound on next visit.  I spent 20 minutes caring for Isabella on this date of service. This time includes time spent by me in the following activities: preparing for the visit, reviewing tests, obtaining and/or reviewing a separately obtained history, performing a medically appropriate examination and/or evaluation, counseling and educating the patient/family/caregiver, ordering medications, tests, or procedures and documenting information in the medical record      Jeffery Cooper MD   11/15/2021  11:03 EST

## 2021-11-22 ENCOUNTER — ROUTINE PRENATAL (OUTPATIENT)
Dept: OBSTETRICS AND GYNECOLOGY | Facility: CLINIC | Age: 31
End: 2021-11-22

## 2021-11-22 ENCOUNTER — TELEPHONE (OUTPATIENT)
Dept: OBSTETRICS AND GYNECOLOGY | Facility: CLINIC | Age: 31
End: 2021-11-22

## 2021-11-22 VITALS — WEIGHT: 145.2 LBS | SYSTOLIC BLOOD PRESSURE: 130 MMHG | BODY MASS INDEX: 27.44 KG/M2 | DIASTOLIC BLOOD PRESSURE: 72 MMHG

## 2021-11-22 DIAGNOSIS — Z98.891 H/O: C-SECTION: ICD-10-CM

## 2021-11-22 DIAGNOSIS — Z3A.31 31 WEEKS GESTATION OF PREGNANCY: ICD-10-CM

## 2021-11-22 DIAGNOSIS — Z34.83 PRENATAL CARE, SUBSEQUENT PREGNANCY IN THIRD TRIMESTER: Primary | ICD-10-CM

## 2021-11-22 LAB
GLUCOSE UR STRIP-MCNC: NEGATIVE MG/DL
PROT UR STRIP-MCNC: NEGATIVE MG/DL

## 2021-11-22 PROCEDURE — 99213 OFFICE O/P EST LOW 20 MIN: CPT | Performed by: OBSTETRICS & GYNECOLOGY

## 2021-11-22 NOTE — TELEPHONE ENCOUNTER
Patient called and wanted to know if you could call her back. She said she is leaking fluid but that has been going on for a month now so not as concerned as she said she is about she is having a lot more [place] and she wanted to know if she would need to come in today to see the doctor?

## 2021-11-30 ENCOUNTER — ROUTINE PRENATAL (OUTPATIENT)
Dept: OBSTETRICS AND GYNECOLOGY | Facility: CLINIC | Age: 31
End: 2021-11-30

## 2021-11-30 VITALS — SYSTOLIC BLOOD PRESSURE: 140 MMHG | WEIGHT: 149 LBS | DIASTOLIC BLOOD PRESSURE: 80 MMHG | BODY MASS INDEX: 28.15 KG/M2

## 2021-11-30 DIAGNOSIS — Z34.83 PRENATAL CARE, SUBSEQUENT PREGNANCY IN THIRD TRIMESTER: Primary | ICD-10-CM

## 2021-11-30 DIAGNOSIS — Z98.891 H/O: C-SECTION: ICD-10-CM

## 2021-11-30 DIAGNOSIS — Z3A.32 32 WEEKS GESTATION OF PREGNANCY: ICD-10-CM

## 2021-11-30 LAB
GLUCOSE UR STRIP-MCNC: NEGATIVE MG/DL
PROT UR STRIP-MCNC: NEGATIVE MG/DL

## 2021-11-30 PROCEDURE — 99213 OFFICE O/P EST LOW 20 MIN: CPT | Performed by: OBSTETRICS & GYNECOLOGY

## 2021-11-30 NOTE — PROGRESS NOTES
OB follow up     Isabella Christiansen is a 31 y.o.  32w6d being seen today for her obstetrical visit.  Patient reports backache, fatigue, nausea, no bleeding, no leaking and occasional contractions. Fetal movement: normal.  We reviewed all the signs and symptoms of early labor and reviewed what she should do if she feels contractions.  I recommended getting off of her feet and laying on her side for at least 1 to 2 hours and drinking large glasses of fluid.  If she has any bloody discharge or large gush of fluid or the contractions get less than 10 minutes apart, I told her she should go to labor and delivery or call the office.  She is now working on ,  and .  She is driving her vehicle but is not doing any delivery or unloading or lifting.    Her prenatal care is complicated by (and status): Prior  x2    Review of Systems  Cramping/contractions : Occasional [place]  Vaginal bleeding: None reported  Fetal movement normal and active    /80   Wt 67.6 kg (149 lb)   LMP 2021   BMI 28.15 kg/m²     FHT:  150s BPM   Uterine Size: size equals dates       Assessment    Diagnoses and all orders for this visit:    1. Prenatal care, subsequent pregnancy in third trimester (Primary)    2. 32 weeks gestation of pregnancy    3. H/O:         1) pregnancy at 32w6d         Plan    Reviewed this stage of pregnancy  Problem list updated   Follow up in 2 weeks.  Today's ultrasound shows normal growth and normal amniotic fluid volume cephalic presentation is noted.  We will see her in 2 weeks.   is planned for 2022.  I spent 20 minutes caring for Isabella on this date of service. This time includes time spent by me in the following activities: preparing for the visit, reviewing tests, obtaining and/or reviewing a separately obtained history, performing a medically appropriate examination and/or evaluation, counseling and educating the patient/family/caregiver  and documenting information in the medical record      Jeffery Cooper MD   11/30/2021  09:34 EST

## 2021-12-14 ENCOUNTER — ROUTINE PRENATAL (OUTPATIENT)
Dept: OBSTETRICS AND GYNECOLOGY | Facility: CLINIC | Age: 31
End: 2021-12-14

## 2021-12-14 VITALS — WEIGHT: 140 LBS | DIASTOLIC BLOOD PRESSURE: 70 MMHG | BODY MASS INDEX: 26.45 KG/M2 | SYSTOLIC BLOOD PRESSURE: 138 MMHG

## 2021-12-14 DIAGNOSIS — Z98.891 H/O: C-SECTION: ICD-10-CM

## 2021-12-14 DIAGNOSIS — Z34.83 PRENATAL CARE, SUBSEQUENT PREGNANCY IN THIRD TRIMESTER: Primary | ICD-10-CM

## 2021-12-14 DIAGNOSIS — Z3A.34 34 WEEKS GESTATION OF PREGNANCY: ICD-10-CM

## 2021-12-14 LAB
GLUCOSE UR STRIP-MCNC: NEGATIVE MG/DL
PROT UR STRIP-MCNC: ABNORMAL MG/DL

## 2021-12-14 PROCEDURE — 99213 OFFICE O/P EST LOW 20 MIN: CPT | Performed by: OBSTETRICS & GYNECOLOGY

## 2021-12-14 NOTE — PROGRESS NOTES
OB follow up     Isabella Christiansen is a 31 y.o.  34w6d being seen today for her obstetrical visit.  Patient reports Pelvic pressure. Fetal movement: normal.    Her prenatal care is complicated by (and status): Prior  section x2    Review of Systems  Cramping/contractions : More so while she is active at work  Vaginal bleeding: Minimal spotting  Fetal movement normal    /70   Wt 63.5 kg (140 lb)   LMP 2021   BMI 26.45 kg/m²     FHT:  150s BPM   Uterine Size: size equals dates       Assessment    Diagnoses and all orders for this visit:    1. Prenatal care, subsequent pregnancy in third trimester (Primary)    2. 34 weeks gestation of pregnancy    3. H/O:         1) pregnancy at 34w6d         Plan    Reviewed this stage of pregnancy  Problem list updated   Follow up in one week.  She will begin work leave soon and we wrote a note today.  Her cervix is not dilating but the head is at zero station giving her quite a bit of pressure.  I spent 20 minutes caring for Isabella on this date of service. This time includes time spent by me in the following activities: preparing for the visit, reviewing tests, obtaining and/or reviewing a separately obtained history, performing a medically appropriate examination and/or evaluation, counseling and educating the patient/family/caregiver and documenting information in the medical record      Jeffery Cooper MD   2021  09:38 EST

## 2021-12-17 ENCOUNTER — TELEPHONE (OUTPATIENT)
Dept: OBSTETRICS AND GYNECOLOGY | Facility: CLINIC | Age: 31
End: 2021-12-17

## 2021-12-17 RX ORDER — FAMOTIDINE 20 MG/1
20 TABLET, FILM COATED ORAL 2 TIMES DAILY PRN
Qty: 30 TABLET | Refills: 1 | Status: SHIPPED | OUTPATIENT
Start: 2021-12-17 | End: 2022-01-10

## 2021-12-17 NOTE — TELEPHONE ENCOUNTER
Patient is having severe heartburn, would like something called in. She cannot tolerated the Tums.   Pharmacy confirmed..

## 2021-12-23 ENCOUNTER — ROUTINE PRENATAL (OUTPATIENT)
Dept: OBSTETRICS AND GYNECOLOGY | Facility: CLINIC | Age: 31
End: 2021-12-23

## 2021-12-23 VITALS — BODY MASS INDEX: 29.06 KG/M2 | DIASTOLIC BLOOD PRESSURE: 68 MMHG | WEIGHT: 153.8 LBS | SYSTOLIC BLOOD PRESSURE: 130 MMHG

## 2021-12-23 DIAGNOSIS — Z3A.36 36 WEEKS GESTATION OF PREGNANCY: Primary | ICD-10-CM

## 2021-12-23 DIAGNOSIS — Z98.891 H/O: C-SECTION: ICD-10-CM

## 2021-12-23 DIAGNOSIS — Z34.83 PRENATAL CARE, SUBSEQUENT PREGNANCY IN THIRD TRIMESTER: ICD-10-CM

## 2021-12-23 LAB
GLUCOSE UR STRIP-MCNC: NEGATIVE MG/DL
PROT UR STRIP-MCNC: NEGATIVE MG/DL

## 2021-12-23 PROCEDURE — 99213 OFFICE O/P EST LOW 20 MIN: CPT | Performed by: OBSTETRICS & GYNECOLOGY

## 2021-12-23 NOTE — PROGRESS NOTES
OB follow up     Isabella Christiansen is a 31 y.o.  36w1d being seen today for her obstetrical visit.  Patient reports no complaints. Fetal movement: normal.  Some increased pelvic pressure when she stands for periods of time.    Her prenatal care is complicated by (and status): Prior  x2 with plans for repeat    Review of Systems  Cramping/contractions : Occasional  Vaginal bleeding: Negative  Fetal movement normal    /68   Wt 69.8 kg (153 lb 12.8 oz)   LMP 2021   BMI 29.06 kg/m²     FHT:  140s BPM   Uterine Size: size equals dates       Assessment    Diagnoses and all orders for this visit:    1. 36 weeks gestation of pregnancy (Primary)  -     Group B Streptococcus Culture - Swab, Vaginal/Rectum    2. Prenatal care, subsequent pregnancy in third trimester    3. H/O:         1) pregnancy at 36w1d         Plan    Reviewed this stage of pregnancy  Problem list updated   Follow up in 1 weeks.  Repeat  scheduled for 2022.  Repeat strep screen done today.  I spent 20 minutes caring for Isabella on this date of service. This time includes time spent by me in the following activities: preparing for the visit, reviewing tests, performing a medically appropriate examination and/or evaluation, counseling and educating the patient/family/caregiver and documenting information in the medical record      Jeffery Cooper MD   2021  10:48 EST

## 2021-12-27 LAB — B-HEM STREP SPEC QL CULT: NEGATIVE

## 2021-12-29 ENCOUNTER — ROUTINE PRENATAL (OUTPATIENT)
Dept: OBSTETRICS AND GYNECOLOGY | Facility: CLINIC | Age: 31
End: 2021-12-29

## 2021-12-29 VITALS — SYSTOLIC BLOOD PRESSURE: 120 MMHG | WEIGHT: 158 LBS | BODY MASS INDEX: 29.85 KG/M2 | DIASTOLIC BLOOD PRESSURE: 60 MMHG

## 2021-12-29 DIAGNOSIS — Z3A.37 37 WEEKS GESTATION OF PREGNANCY: ICD-10-CM

## 2021-12-29 DIAGNOSIS — Z34.83 PRENATAL CARE, SUBSEQUENT PREGNANCY IN THIRD TRIMESTER: Primary | ICD-10-CM

## 2021-12-29 DIAGNOSIS — Z98.891 H/O: C-SECTION: ICD-10-CM

## 2021-12-29 PROCEDURE — 99213 OFFICE O/P EST LOW 20 MIN: CPT | Performed by: OBSTETRICS & GYNECOLOGY

## 2021-12-29 NOTE — PROGRESS NOTES
OB follow up     Isabella Christiansen is a 31 y.o.  37w0d being seen today for her obstetrical visit.  Patient reports no complaints. Fetal movement: normal.  She experienced a fall on her floor due to slipping last evening.  She fell on her right hip and left arm.  She had no bleeding and no abnormal discharge and felt fetal movement probably within the hour.  She did not continue to have any issues that she was concerned about.    Her prenatal care is complicated by (and status): Previous     Review of Systems  Cramping/contractions : None reported  Vaginal bleeding: Negative.  She had some light bleeding after the exam last week  Fetal movement normal    /60   Wt 71.7 kg (158 lb)   LMP 2021   BMI 29.85 kg/m²     FHT:  136 BPM   Uterine Size: size equals dates       Assessment    Diagnoses and all orders for this visit:    1. Prenatal care, subsequent pregnancy in third trimester (Primary)    2. 37 weeks gestation of pregnancy    3. H/O:         1) pregnancy at 37w0d         Plan    Reviewed this stage of pregnancy  Problem list updated   Follow up in 1 week.  Patient will continue to monitor signs for labor.  Presently, she is scheduled for  on 2022.  She will make a prenatal appointment next week.  I told her that she would not need to be examined unless she were feeling significant pressure.  I spent 20 minutes caring for Isabella on this date of service. This time includes time spent by me in the following activities: preparing for the visit, reviewing tests, obtaining and/or reviewing a separately obtained history, performing a medically appropriate examination and/or evaluation, counseling and educating the patient/family/caregiver and documenting information in the medical record      Jeffery Cooper MD   2021  10:43 EST

## 2022-01-03 ENCOUNTER — TELEPHONE (OUTPATIENT)
Dept: OBSTETRICS AND GYNECOLOGY | Facility: CLINIC | Age: 32
End: 2022-01-03

## 2022-01-03 NOTE — TELEPHONE ENCOUNTER
38 weeks pregnant. Difficulty urinating. Not like UTI. Said she will feel urge and when tries cant. If she walks around a little she can then urinate. Feel like its the way the baby is laying. Also last couple of days has been nauseated. Pt Ph [phone]

## 2022-01-05 ENCOUNTER — ROUTINE PRENATAL (OUTPATIENT)
Dept: OBSTETRICS AND GYNECOLOGY | Facility: CLINIC | Age: 32
End: 2022-01-05

## 2022-01-05 VITALS — WEIGHT: 155 LBS | DIASTOLIC BLOOD PRESSURE: 78 MMHG | BODY MASS INDEX: 29.29 KG/M2 | SYSTOLIC BLOOD PRESSURE: 118 MMHG

## 2022-01-05 DIAGNOSIS — Z34.93 PRENATAL CARE IN THIRD TRIMESTER: ICD-10-CM

## 2022-01-05 DIAGNOSIS — Z98.891 HISTORY OF 2 CESAREAN SECTIONS: ICD-10-CM

## 2022-01-05 DIAGNOSIS — Z3A.38 38 WEEKS GESTATION OF PREGNANCY: Primary | ICD-10-CM

## 2022-01-05 LAB
GLUCOSE UR STRIP-MCNC: NEGATIVE MG/DL
PROT UR STRIP-MCNC: NEGATIVE MG/DL

## 2022-01-05 PROCEDURE — 99212 OFFICE O/P EST SF 10 MIN: CPT | Performed by: STUDENT IN AN ORGANIZED HEALTH CARE EDUCATION/TRAINING PROGRAM

## 2022-01-05 NOTE — PROGRESS NOTES
"Chief Complaint   Patient presents with   • Routine Prenatal Visit      Isabella Christiansen is a 31 y.o.  at 38w0d who presents for routine prenatal visit. She reports having intermittent vaginal spotting and \"lightening crotch.\" Denies heavy vaginal bleeding, leakage of fluid, abdominal cramping or contractions. She reports active fetal movement.     /78   Wt 70.3 kg (155 lb)   LMP 2021   BMI 29.29 kg/m²    Gen: well appearing, NAD   Abd: gravid, nontender  SVE: /-3   See OB Flowsheet    ASSESSMENT:   1. IUP at 38w0d   2. Prenatal care in third trimester   3. History of 2  sections     PLAN:  Problem list reviewed and updated.   Her cervical exam today demonstrated that she is dilated to 1 cm with thick cervix noted that does not feel to be labored and no signs of blood. Reviewed labor precautions.   Reviewed expectations of this stage of pregnancy.   Third trimester precautions reviewed including labor signs, monitoring fetal movements.   Scheduled for  section on 22 at 0900.     There are no problems to display for this patient.      Orders Placed This Encounter   Procedures   • POC Urinalysis Dipstick     This is an external result entered through the Results Console.     Order Specific Question:   Release to patient     Answer:   Immediate     Eloisa Yuen MD           "

## 2022-01-10 RX ORDER — FAMOTIDINE 20 MG/1
TABLET, FILM COATED ORAL
Qty: 30 TABLET | Refills: 1 | Status: SHIPPED | OUTPATIENT
Start: 2022-01-10 | End: 2022-02-21

## 2022-01-12 ENCOUNTER — ANESTHESIA (OUTPATIENT)
Dept: LABOR AND DELIVERY | Facility: HOSPITAL | Age: 32
End: 2022-01-12

## 2022-01-12 ENCOUNTER — ANESTHESIA EVENT (OUTPATIENT)
Dept: LABOR AND DELIVERY | Facility: HOSPITAL | Age: 32
End: 2022-01-12

## 2022-01-12 ENCOUNTER — HOSPITAL ENCOUNTER (INPATIENT)
Facility: HOSPITAL | Age: 32
LOS: 4 days | Discharge: HOME | End: 2022-01-16
Attending: OBSTETRICS & GYNECOLOGY | Admitting: OBSTETRICS & GYNECOLOGY

## 2022-01-12 DIAGNOSIS — Z98.891 S/P CESAREAN SECTION: Primary | ICD-10-CM

## 2022-01-12 PROBLEM — Z34.90 PREGNANCY: Status: ACTIVE | Noted: 2022-01-12

## 2022-01-12 LAB
ABO GROUP BLD: NORMAL
BLD GP AB SCN SERPL QL: NEGATIVE
DEPRECATED RDW RBC AUTO: 40.5 FL (ref 37–54)
ERYTHROCYTE [DISTWIDTH] IN BLOOD BY AUTOMATED COUNT: 12.4 % (ref 12.3–15.4)
HCT VFR BLD AUTO: 36.4 % (ref 34–46.6)
HGB BLD-MCNC: 12.4 G/DL (ref 12–15.9)
MCH RBC QN AUTO: 30.2 PG (ref 26.6–33)
MCHC RBC AUTO-ENTMCNC: 34.1 G/DL (ref 31.5–35.7)
MCV RBC AUTO: 88.8 FL (ref 79–97)
PLATELET # BLD AUTO: 243 10*3/MM3 (ref 140–450)
PMV BLD AUTO: 10.5 FL (ref 6–12)
RBC # BLD AUTO: 4.1 10*6/MM3 (ref 3.77–5.28)
RH BLD: POSITIVE
SARS-COV-2 RNA PNL SPEC NAA+PROBE: NOT DETECTED
T&S EXPIRATION DATE: NORMAL
WBC NRBC COR # BLD: 14.11 10*3/MM3 (ref 3.4–10.8)

## 2022-01-12 PROCEDURE — 25010000002 KETOROLAC TROMETHAMINE PER 15 MG: Performed by: NURSE ANESTHETIST, CERTIFIED REGISTERED

## 2022-01-12 PROCEDURE — 0 CEFAZOLIN IN DEXTROSE 2-4 GM/100ML-% SOLUTION: Performed by: OBSTETRICS & GYNECOLOGY

## 2022-01-12 PROCEDURE — 25010000002 ONDANSETRON PER 1 MG: Performed by: ANESTHESIOLOGY

## 2022-01-12 PROCEDURE — 87635 SARS-COV-2 COVID-19 AMP PRB: CPT | Performed by: OBSTETRICS & GYNECOLOGY

## 2022-01-12 PROCEDURE — S0260 H&P FOR SURGERY: HCPCS | Performed by: OBSTETRICS & GYNECOLOGY

## 2022-01-12 PROCEDURE — 86901 BLOOD TYPING SEROLOGIC RH(D): CPT | Performed by: OBSTETRICS & GYNECOLOGY

## 2022-01-12 PROCEDURE — 25010000002 HYDROMORPHONE PER 4 MG: Performed by: ANESTHESIOLOGY

## 2022-01-12 PROCEDURE — 59515 CESAREAN DELIVERY: CPT | Performed by: OBSTETRICS & GYNECOLOGY

## 2022-01-12 PROCEDURE — 86850 RBC ANTIBODY SCREEN: CPT | Performed by: OBSTETRICS & GYNECOLOGY

## 2022-01-12 PROCEDURE — 25010000002 FENTANYL CITRATE (PF) 50 MCG/ML SOLUTION: Performed by: ANESTHESIOLOGY

## 2022-01-12 PROCEDURE — 25010000002 KETOROLAC TROMETHAMINE PER 15 MG: Performed by: OBSTETRICS & GYNECOLOGY

## 2022-01-12 PROCEDURE — C1889 IMPLANT/INSERT DEVICE, NOC: HCPCS | Performed by: OBSTETRICS & GYNECOLOGY

## 2022-01-12 PROCEDURE — 85027 COMPLETE CBC AUTOMATED: CPT | Performed by: OBSTETRICS & GYNECOLOGY

## 2022-01-12 PROCEDURE — 59514 CESAREAN DELIVERY ONLY: CPT | Performed by: OBSTETRICS & GYNECOLOGY

## 2022-01-12 PROCEDURE — 86900 BLOOD TYPING SEROLOGIC ABO: CPT | Performed by: OBSTETRICS & GYNECOLOGY

## 2022-01-12 DEVICE — DEV CONTRL TISS STRATAFIX SPIRAL MNCRYL PLS PS2 3/0 30CM UD: Type: IMPLANTABLE DEVICE | Site: ABDOMEN | Status: FUNCTIONAL

## 2022-01-12 RX ORDER — DIPHENHYDRAMINE HYDROCHLORIDE 50 MG/ML
25 INJECTION INTRAMUSCULAR; INTRAVENOUS EVERY 4 HOURS PRN
Status: DISCONTINUED | OUTPATIENT
Start: 2022-01-12 | End: 2022-01-16 | Stop reason: HOSPADM

## 2022-01-12 RX ORDER — FENTANYL CITRATE 50 UG/ML
INJECTION, SOLUTION INTRAMUSCULAR; INTRAVENOUS
Status: COMPLETED | OUTPATIENT
Start: 2022-01-12 | End: 2022-01-12

## 2022-01-12 RX ORDER — DIPHENHYDRAMINE HCL 25 MG
25 CAPSULE ORAL EVERY 4 HOURS PRN
Status: DISCONTINUED | OUTPATIENT
Start: 2022-01-12 | End: 2022-01-16 | Stop reason: HOSPADM

## 2022-01-12 RX ORDER — METHYLERGONOVINE MALEATE 0.2 MG/ML
200 INJECTION INTRAVENOUS ONCE AS NEEDED
Status: DISCONTINUED | OUTPATIENT
Start: 2022-01-12 | End: 2022-01-12 | Stop reason: HOSPADM

## 2022-01-12 RX ORDER — ACETAMINOPHEN 500 MG
1000 TABLET ORAL ONCE
Status: COMPLETED | OUTPATIENT
Start: 2022-01-12 | End: 2022-01-12

## 2022-01-12 RX ORDER — OXYTOCIN-SODIUM CHLORIDE 0.9% IV SOLN 30 UNIT/500ML 30-0.9/5 UT/ML-%
SOLUTION INTRAVENOUS
Status: COMPLETED
Start: 2022-01-12 | End: 2022-01-12

## 2022-01-12 RX ORDER — IBUPROFEN 800 MG/1
800 TABLET ORAL EVERY 8 HOURS PRN
Status: DISCONTINUED | OUTPATIENT
Start: 2022-01-12 | End: 2022-01-12

## 2022-01-12 RX ORDER — ERYTHROMYCIN 5 MG/G
OINTMENT OPHTHALMIC
Status: ACTIVE
Start: 2022-01-12 | End: 2022-01-12

## 2022-01-12 RX ORDER — FAMOTIDINE 10 MG/ML
20 INJECTION, SOLUTION INTRAVENOUS ONCE AS NEEDED
Status: COMPLETED | OUTPATIENT
Start: 2022-01-12 | End: 2022-01-12

## 2022-01-12 RX ORDER — BUPIVACAINE HYDROCHLORIDE 7.5 MG/ML
INJECTION, SOLUTION EPIDURAL; RETROBULBAR
Status: COMPLETED | OUTPATIENT
Start: 2022-01-12 | End: 2022-01-12

## 2022-01-12 RX ORDER — ONDANSETRON 4 MG/1
4 TABLET, FILM COATED ORAL EVERY 8 HOURS PRN
Status: DISCONTINUED | OUTPATIENT
Start: 2022-01-12 | End: 2022-01-16 | Stop reason: HOSPADM

## 2022-01-12 RX ORDER — IBUPROFEN 800 MG/1
800 TABLET ORAL EVERY 8 HOURS PRN
Status: DISCONTINUED | OUTPATIENT
Start: 2022-01-13 | End: 2022-01-16 | Stop reason: HOSPADM

## 2022-01-12 RX ORDER — OXYTOCIN-SODIUM CHLORIDE 0.9% IV SOLN 30 UNIT/500ML 30-0.9/5 UT/ML-%
250 SOLUTION INTRAVENOUS CONTINUOUS PRN
Status: DISPENSED | OUTPATIENT
Start: 2022-01-12 | End: 2022-01-12

## 2022-01-12 RX ORDER — NALOXONE HCL 0.4 MG/ML
0.2 VIAL (ML) INJECTION
Status: DISCONTINUED | OUTPATIENT
Start: 2022-01-12 | End: 2022-01-16 | Stop reason: HOSPADM

## 2022-01-12 RX ORDER — CEFAZOLIN SODIUM 2 G/100ML
2 INJECTION, SOLUTION INTRAVENOUS ONCE
Status: COMPLETED | OUTPATIENT
Start: 2022-01-12 | End: 2022-01-12

## 2022-01-12 RX ORDER — PRENATAL VIT/IRON FUM/FOLIC AC 27MG-0.8MG
1 TABLET ORAL DAILY
Status: DISCONTINUED | OUTPATIENT
Start: 2022-01-12 | End: 2022-01-16 | Stop reason: HOSPADM

## 2022-01-12 RX ORDER — PHYTONADIONE 1 MG/.5ML
INJECTION, EMULSION INTRAMUSCULAR; INTRAVENOUS; SUBCUTANEOUS
Status: ACTIVE
Start: 2022-01-12 | End: 2022-01-12

## 2022-01-12 RX ORDER — HYDROMORPHONE HYDROCHLORIDE 1 MG/ML
0.5 INJECTION, SOLUTION INTRAMUSCULAR; INTRAVENOUS; SUBCUTANEOUS
Status: DISCONTINUED | OUTPATIENT
Start: 2022-01-12 | End: 2022-01-12 | Stop reason: HOSPADM

## 2022-01-12 RX ORDER — KETOROLAC TROMETHAMINE 30 MG/ML
30 INJECTION, SOLUTION INTRAMUSCULAR; INTRAVENOUS EVERY 6 HOURS PRN
Status: DISPENSED | OUTPATIENT
Start: 2022-01-12 | End: 2022-01-13

## 2022-01-12 RX ORDER — SODIUM CHLORIDE, SODIUM LACTATE, POTASSIUM CHLORIDE, CALCIUM CHLORIDE 600; 310; 30; 20 MG/100ML; MG/100ML; MG/100ML; MG/100ML
125 INJECTION, SOLUTION INTRAVENOUS CONTINUOUS
Status: DISCONTINUED | OUTPATIENT
Start: 2022-01-12 | End: 2022-01-16 | Stop reason: HOSPADM

## 2022-01-12 RX ORDER — KETOROLAC TROMETHAMINE 30 MG/ML
INJECTION, SOLUTION INTRAMUSCULAR; INTRAVENOUS AS NEEDED
Status: DISCONTINUED | OUTPATIENT
Start: 2022-01-12 | End: 2022-01-12 | Stop reason: SURG

## 2022-01-12 RX ORDER — MISOPROSTOL 200 UG/1
800 TABLET ORAL ONCE AS NEEDED
Status: DISCONTINUED | OUTPATIENT
Start: 2022-01-12 | End: 2022-01-12 | Stop reason: HOSPADM

## 2022-01-12 RX ORDER — OXYCODONE HYDROCHLORIDE AND ACETAMINOPHEN 5; 325 MG/1; MG/1
1 TABLET ORAL EVERY 4 HOURS PRN
Status: DISCONTINUED | OUTPATIENT
Start: 2022-01-12 | End: 2022-01-16 | Stop reason: HOSPADM

## 2022-01-12 RX ORDER — OXYTOCIN-SODIUM CHLORIDE 0.9% IV SOLN 30 UNIT/500ML 30-0.9/5 UT/ML-%
125 SOLUTION INTRAVENOUS CONTINUOUS PRN
Status: COMPLETED | OUTPATIENT
Start: 2022-01-12 | End: 2022-01-12

## 2022-01-12 RX ORDER — SODIUM CHLORIDE 0.9 % (FLUSH) 0.9 %
10 SYRINGE (ML) INJECTION EVERY 12 HOURS SCHEDULED
Status: DISCONTINUED | OUTPATIENT
Start: 2022-01-12 | End: 2022-01-12 | Stop reason: HOSPADM

## 2022-01-12 RX ORDER — HYDROCODONE BITARTRATE AND ACETAMINOPHEN 5; 325 MG/1; MG/1
1 TABLET ORAL EVERY 4 HOURS PRN
Status: DISCONTINUED | OUTPATIENT
Start: 2022-01-12 | End: 2022-01-16 | Stop reason: HOSPADM

## 2022-01-12 RX ORDER — CARBOPROST TROMETHAMINE 250 UG/ML
250 INJECTION, SOLUTION INTRAMUSCULAR
Status: DISCONTINUED | OUTPATIENT
Start: 2022-01-12 | End: 2022-01-12 | Stop reason: HOSPADM

## 2022-01-12 RX ORDER — OXYCODONE AND ACETAMINOPHEN 10; 325 MG/1; MG/1
1 TABLET ORAL EVERY 4 HOURS PRN
Status: DISCONTINUED | OUTPATIENT
Start: 2022-01-12 | End: 2022-01-16 | Stop reason: HOSPADM

## 2022-01-12 RX ORDER — HYDROMORPHONE HYDROCHLORIDE 1 MG/ML
0.5 INJECTION, SOLUTION INTRAMUSCULAR; INTRAVENOUS; SUBCUTANEOUS
Status: ACTIVE | OUTPATIENT
Start: 2022-01-12 | End: 2022-01-14

## 2022-01-12 RX ORDER — ONDANSETRON 2 MG/ML
4 INJECTION INTRAMUSCULAR; INTRAVENOUS ONCE AS NEEDED
Status: DISCONTINUED | OUTPATIENT
Start: 2022-01-12 | End: 2022-01-16 | Stop reason: HOSPADM

## 2022-01-12 RX ORDER — ALUMINA, MAGNESIA, AND SIMETHICONE 2400; 2400; 240 MG/30ML; MG/30ML; MG/30ML
15 SUSPENSION ORAL EVERY 4 HOURS PRN
Status: DISCONTINUED | OUTPATIENT
Start: 2022-01-12 | End: 2022-01-16 | Stop reason: HOSPADM

## 2022-01-12 RX ORDER — SODIUM CHLORIDE 0.9 % (FLUSH) 0.9 %
10 SYRINGE (ML) INJECTION AS NEEDED
Status: DISCONTINUED | OUTPATIENT
Start: 2022-01-12 | End: 2022-01-12 | Stop reason: HOSPADM

## 2022-01-12 RX ORDER — CALCIUM CARBONATE 200(500)MG
1 TABLET,CHEWABLE ORAL EVERY 4 HOURS PRN
Status: DISCONTINUED | OUTPATIENT
Start: 2022-01-12 | End: 2022-01-16 | Stop reason: HOSPADM

## 2022-01-12 RX ORDER — ONDANSETRON 2 MG/ML
4 INJECTION INTRAMUSCULAR; INTRAVENOUS ONCE AS NEEDED
Status: COMPLETED | OUTPATIENT
Start: 2022-01-12 | End: 2022-01-12

## 2022-01-12 RX ORDER — OXYTOCIN-SODIUM CHLORIDE 0.9% IV SOLN 30 UNIT/500ML 30-0.9/5 UT/ML-%
999 SOLUTION INTRAVENOUS ONCE
Status: COMPLETED | OUTPATIENT
Start: 2022-01-12 | End: 2022-01-12

## 2022-01-12 RX ORDER — OXYCODONE HYDROCHLORIDE AND ACETAMINOPHEN 5; 325 MG/1; MG/1
1 TABLET ORAL ONCE
Status: COMPLETED | OUTPATIENT
Start: 2022-01-12 | End: 2022-01-12

## 2022-01-12 RX ADMIN — CEFAZOLIN SODIUM 2 G: 2 INJECTION, SOLUTION INTRAVENOUS at 09:06

## 2022-01-12 RX ADMIN — OXYTOCIN-SODIUM CHLORIDE 0.9% IV SOLN 30 UNIT/500ML 999 ML/HR: 30-0.9/5 SOLUTION at 09:39

## 2022-01-12 RX ADMIN — ACETAMINOPHEN 1000 MG: 500 TABLET, FILM COATED ORAL at 08:39

## 2022-01-12 RX ADMIN — OXYCODONE AND ACETAMINOPHEN 1 TABLET: 5; 325 TABLET ORAL at 12:03

## 2022-01-12 RX ADMIN — OXYTOCIN 125 ML/HR: 10 INJECTION INTRAVENOUS at 11:20

## 2022-01-12 RX ADMIN — FAMOTIDINE 20 MG: 10 INJECTION INTRAVENOUS at 08:39

## 2022-01-12 RX ADMIN — ONDANSETRON 4 MG: 2 INJECTION INTRAMUSCULAR; INTRAVENOUS at 08:39

## 2022-01-12 RX ADMIN — SODIUM CHLORIDE, POTASSIUM CHLORIDE, SODIUM LACTATE AND CALCIUM CHLORIDE 1000 ML: 600; 310; 30; 20 INJECTION, SOLUTION INTRAVENOUS at 06:48

## 2022-01-12 RX ADMIN — HYDROMORPHONE HYDROCHLORIDE 0.5 MG: 1 INJECTION, SOLUTION INTRAMUSCULAR; INTRAVENOUS; SUBCUTANEOUS at 11:27

## 2022-01-12 RX ADMIN — FENTANYL CITRATE 20 MCG: 0.05 INJECTION, SOLUTION INTRAMUSCULAR; INTRAVENOUS at 09:19

## 2022-01-12 RX ADMIN — KETOROLAC TROMETHAMINE 30 MG: 30 INJECTION, SOLUTION INTRAMUSCULAR; INTRAVENOUS at 09:46

## 2022-01-12 RX ADMIN — OXYCODONE AND ACETAMINOPHEN 1 TABLET: 5; 325 TABLET ORAL at 14:17

## 2022-01-12 RX ADMIN — KETOROLAC TROMETHAMINE 30 MG: 30 INJECTION, SOLUTION INTRAMUSCULAR; INTRAVENOUS at 13:38

## 2022-01-12 RX ADMIN — BUPIVACAINE HYDROCHLORIDE 2 ML: 7.5 INJECTION, SOLUTION EPIDURAL; RETROBULBAR at 09:19

## 2022-01-12 RX ADMIN — OXYCODONE HYDROCHLORIDE AND ACETAMINOPHEN 1 TABLET: 10; 325 TABLET ORAL at 22:26

## 2022-01-12 RX ADMIN — SODIUM CHLORIDE, POTASSIUM CHLORIDE, SODIUM LACTATE AND CALCIUM CHLORIDE 125 ML/HR: 600; 310; 30; 20 INJECTION, SOLUTION INTRAVENOUS at 07:56

## 2022-01-12 RX ADMIN — OXYTOCIN-SODIUM CHLORIDE 0.9% IV SOLN 30 UNIT/500ML 125 ML/HR: 30-0.9/5 SOLUTION at 11:20

## 2022-01-12 RX ADMIN — OXYCODONE HYDROCHLORIDE AND ACETAMINOPHEN 1 TABLET: 10; 325 TABLET ORAL at 18:21

## 2022-01-12 RX ADMIN — KETOROLAC TROMETHAMINE 30 MG: 30 INJECTION, SOLUTION INTRAMUSCULAR; INTRAVENOUS at 20:03

## 2022-01-12 NOTE — ANESTHESIA PROCEDURE NOTES
Spinal Block      Patient reassessed immediately prior to procedure    Patient location during procedure: OR  Start Time: 1/12/2022 9:19 AM  Performed By  Anesthesiologist: Javier Carrera MD  CRNA: Elliott Taylor CRNA  Preanesthetic Checklist  Completed: patient identified, IV checked, site marked, risks and benefits discussed, surgical consent, monitors and equipment checked, pre-op evaluation and timeout performed  Spinal Block Prep:  Sterile Tech:cap, gloves, sterile barriers, gown and mask  Prep:Chloraprep  Patient Monitoring:EKG, continuous pulse oximetry and blood pressure monitoring  Spinal Block Procedure  Approach:midline  Guidance:palpation technique  Location:L3-L4  Needle Type:Sprotte  Needle Gauge:24 G  Placement of Spinal needle event:cerebrospinal fluid aspirated  Paresthesia: no  Fluid Appearance:clear  Medications: fentaNYL citrate (PF) (SUBLIMAZE) injection, 20 mcg  bupivacaine PF (MARCAINE) 0.75 % injection, 2 mL  Med Administered at 1/12/2022 9:19 AM   Post Assessment  Patient Tolerance:patient tolerated the procedure well with no apparent complications  Complications no

## 2022-01-12 NOTE — PLAN OF CARE
Goal Outcome Evaluation:  Plan of Care Reviewed With: patient, spouse        Progress: improving  Outcome Summary: vss, pain under control with meds, eating clear liquid diet, no emesis, bonding well with baby

## 2022-01-12 NOTE — PLAN OF CARE
Problem: Adult Inpatient Plan of Care  Goal: Plan of Care Review  1/12/2022 1219 by Jose Blount RN  Outcome: Ongoing, Progressing  1/12/2022 1218 by Jose Blount RN  Outcome: Ongoing, Progressing  Flowsheets (Taken 1/12/2022 1218)  Progress: improving  Plan of Care Reviewed With: patient  Goal: Patient-Specific Goal (Individualized)  1/12/2022 1219 by Jose Blount RN  Outcome: Ongoing, Progressing  1/12/2022 1218 by Jose Blount RN  Outcome: Ongoing, Progressing  Goal: Absence of Hospital-Acquired Illness or Injury  1/12/2022 1219 by Jose Blount RN  Outcome: Ongoing, Progressing  1/12/2022 1218 by Jose Blount RN  Outcome: Ongoing, Progressing  Intervention: Identify and Manage Fall Risk  Recent Flowsheet Documentation  Taken 1/12/2022 1045 by Jose Blount RN  Safety Promotion/Fall Prevention: safety round/check completed  Taken 1/12/2022 0800 by Jose Blount RN  Safety Promotion/Fall Prevention: safety round/check completed  Intervention: Prevent Skin Injury  Recent Flowsheet Documentation  Taken 1/12/2022 0800 by Jose Blount RN  Body Position: sitting up in bed  Intervention: Prevent and Manage VTE (venous thromboembolism) Risk  Recent Flowsheet Documentation  Taken 1/12/2022 1045 by Jose Blount RN  VTE Prevention/Management:   bilateral   sequential compression devices on  Taken 1/12/2022 1030 by Jose Blount RN  VTE Prevention/Management:   bilateral   sequential compression devices on  Taken 1/12/2022 1015 by Jose Blount RN  VTE Prevention/Management:   bilateral   sequential compression devices on  Goal: Optimal Comfort and Wellbeing  1/12/2022 1219 by Jose Blount RN  Outcome: Ongoing, Progressing  1/12/2022 1218 by Jose Blount RN  Outcome: Ongoing, Progressing  Intervention: Provide Person-Centered Care  Recent Flowsheet Documentation  Taken 1/12/2022 1045 by Jose Blount RN  Trust Relationship/Rapport:   care explained   choices provided   questions answered  Taken  2022 0800 by Jose Blount RN  Trust Relationship/Rapport:   care explained   choices provided   questions encouraged   reassurance provided   questions answered   empathic listening provided   emotional support provided   thoughts/feelings acknowledged  Goal: Readiness for Transition of Care  2022 1219 by Jose Blount RN  Outcome: Ongoing, Progressing  2022 1218 by Jose Blount RN  Outcome: Ongoing, Progressing     Problem: Bleeding ( Delivery)  Goal: Bleeding is Controlled  2022 1219 by Jose Blount RN  Outcome: Ongoing, Progressing  2022 1218 by Jose Blount RN  Outcome: Ongoing, Progressing     Problem: Change in Fetal Wellbeing ( Delivery)  Goal: Stable Fetal Wellbeing  2022 1219 by Jose Blount RN  Outcome: Ongoing, Progressing  2022 1218 by Joes Blount RN  Outcome: Ongoing, Progressing  Intervention: Promote and Monitor Fetal Wellbeing  Recent Flowsheet Documentation  Taken 2022 0800 by Jose Blount RN  Body Position: sitting up in bed     Problem: Infection ( Delivery)  Goal: Absence of Infection Signs and Symptoms  2022 1219 by Jose Blount RN  Outcome: Ongoing, Progressing  2022 1218 by Jose Blount RN  Outcome: Ongoing, Progressing     Problem: Respiratory Compromise ( Delivery)  Goal: Effective Oxygenation and Ventilation  2022 1219 by Jose Blount RN  Outcome: Ongoing, Progressing  2022 1218 by Jose Blount RN  Outcome: Ongoing, Progressing     Problem:  Fall Injury Risk  Goal: Absence of Fall, Infant Drop and Related Injury  2022 1219 by Jose Blount RN  Outcome: Ongoing, Progressing  2022 1218 by Jose Blount RN  Outcome: Ongoing, Progressing  Intervention: Promote Injury-Free Environment  Recent Flowsheet Documentation  Taken 2022 1045 by Jose Blount RN  Safety Promotion/Fall Prevention: safety round/check completed  Taken 2022 0800 by Jose Blount  RN  Safety Promotion/Fall Prevention: safety round/check completed     Problem: Skin Injury Risk Increased  Goal: Skin Health and Integrity  2022 1219 by Jose Blount RN  Outcome: Ongoing, Progressing  2022 1218 by Jose Blount RN  Outcome: Ongoing, Progressing     Problem: Adjustment to Role Transition (Postpartum  Delivery)  Goal: Successful Maternal Role Transition  Outcome: Ongoing, Progressing     Problem: Bleeding (Postpartum  Delivery)  Goal: Hemostasis  Outcome: Ongoing, Progressing     Problem: Infection (Postpartum  Delivery)  Goal: Absence of Infection Signs and Symptoms  Outcome: Ongoing, Progressing     Problem: Pain (Postpartum  Delivery)  Goal: Acceptable Pain Control  Outcome: Ongoing, Progressing  Intervention: Prevent or Manage Pain  Recent Flowsheet Documentation  Taken 2022 1145 by Jose Blount RN  Pain Management Interventions: see MAR     Problem: Postoperative Nausea and Vomiting (Postpartum  Delivery)  Goal: Nausea and Vomiting Relief  Outcome: Ongoing, Progressing     Problem: Postoperative Urinary Retention (Postpartum  Delivery)  Goal: Effective Urinary Elimination  Outcome: Ongoing, Progressing   Goal Outcome Evaluation:  Plan of Care Reviewed With: patient        Progress: improving

## 2022-01-12 NOTE — LACTATION NOTE
This note was copied from a baby's chart.  Mother reports she is exclusively formula feeding, plans to use sensitive formula as that is what she used with other children successfully. She denies any questions or concerns regarding lactation or milk suppression.

## 2022-01-12 NOTE — ANESTHESIA POSTPROCEDURE EVALUATION
Patient: Isabella Christiansen    Procedure Summary     Date: 22 Room / Location:  [facility]   [facility]    Anesthesia Start: 909 Anesthesia Stop:     Procedure:  SECTION REPEAT (N/A Abdomen) Diagnosis:     Surgeons: Jeffery Cooper MD Provider: Javier Carrera MD    Anesthesia Type: spinal ASA Status: 2          Anesthesia Type: spinal    Vitals  Vitals Value Taken Time   /76 22 1115   Temp 36.7 °C (98 °F) 22 1015   Pulse 68 22 1124   Resp 18 22 1115   SpO2 99 % 22 112   Vitals shown include unvalidated device data.        Post Anesthesia Care and Evaluation    Patient location during evaluation: bedside  Patient participation: complete - patient participated  Level of consciousness: awake  Pain management: adequate  Airway patency: patent  Anesthetic complications: No anesthetic complications  PONV Status: controlled  Cardiovascular status: acceptable  Respiratory status: acceptable  Hydration status: acceptable    Comments: --------------------            22               1115     --------------------   BP:       116/76     Pulse:      72       Resp:       18       Temp:                SpO2:      99%      --------------------

## 2022-01-12 NOTE — L&D DELIVERY NOTE
[facility]   Section Operative Note    Pre-Operative Dx:   1.  IUP at Gestational Age: 39w0d  weeks    2. Prior  x2 at 39 weeks gestation   Prior C/S    Postoperative dx:    1.  Same     Procedure: Procedure(s):   SECTION REPEAT   Surgeon/Assistant: Surgeon(s):  Jeffery Cooper MD Lebder, MD Dr. Kenneth Ac is the primary surgeon.  Dr. Casanova is the first assistant whose presence and skill was needed for adequate retraction, suturing and hemostasis and exposure as well as adequate fundal pressure for safe delivery of the baby.         Anesthesia:  Anesthesiologist: Choice  Anesthesiologist: Javier Carrera MD  CRNA: Elliott Taylor CRNA     EBL: <500ml mls.          QBL:    Quantitative Blood Loss (mL): 405 mL (22 1030)     IV Fluids: . mls.   UOP:  800 mls.    I/O this shift:  In: 1908.1 [I.V.:1808.1; IV Piggyback:100]  Out: 1205 [Urine:800; Blood:405]   Antibiotics: cefazolin (Ancef)     Infant:      Name:  .         Gender: female  infant    Weight: 2945 g (6 lb 7.9 oz)     Apgars: 8  @ 1 minute /     9  @ 5 minutes    Cord gases: Venous:  No results found for: PHCVEN     Arterial:  No results found for: PHCART     Indication for C/Section:  Prior C/S         Priority for C/Section:  routine      Procedure Details:   Patient was taken to the operating room and adequate spinal anesthetic is administered.  A timeout is performed and she is prepped and draped in usual manner.  Antibiotics have been given.  A Nolasco catheter is in place.  A Pfannenstiel incision was made over her previous incision line down to the fascia.  Fascia was incised and extended bilaterally.  It was also undermined superiorly.  Peritoneum was entered without difficulty and extended bluntly.  A bladder flap was created and a lower uterine segment was then scored and extended with bandage scissors.  Head was elevated through the incision and fundal pressure and gentle traction delivered  the baby without difficulty.  Bulb suction was carried out and 30 seconds transpired prior to cord clamping.  Cord blood was then obtained and the placenta was delivered with fundal massage and gentle traction.  It appeared to be a marginal cord insertion.  Placenta was otherwise intact and without abnormalities.  We swept the uterus and then closed the incision with 2 layers of 0 Vicryl the second being in implicating layer.  Good hemostasis result and good uterine tone was noted both fallopian tubes and ovaries were inspected and appeared normal hemostasis was again adequate at the incision line.  We then reapproximated the rectus muscle with interrupted 0 chromic.  0 Vicryl was used to close the fascia in a running nonlocking fashion.  Tissue irrigated skin was closed with a running 3 oh Mono Derm Quill suture in a subcuticular fashion.      Complications:   None      Disposition:   Mother to Mother Baby/Postpartum  in stable condition currently.   Baby to remains with mom  in stable condition currently.       Jeffery Cooper MD  1/12/2022  12:16 EST

## 2022-01-12 NOTE — H&P
[facility]  Obstetric History and Physical    Chief Complaint   Patient presents with   • Scheduled      39 wk elective        Subjective     Patient is a 31 y.o. female  currently at 39w0d, who presents with 39-week gestation for scheduled repeat .  She has had 2 prior  sections.  She has had uneventful prenatal course and had normal NIPT.  She has noted normal fetal movement and no fluid leakage or bleeding prior to admission..    Her prenatal care is benign.  Her previous obstetric/gynecological history is noted for is remarkable for 2 prior  sections.    The following portions of the patients history were reviewed and updated as appropriate: current medications, allergies, past medical history, past surgical history, past family history, past social history and problem list .       Prenatal Information:  Prenatal Results     POC Urine Glucose/Protein     Test Value Reference Range Date Time    Urine Glucose ^ Negative  Negative, 1000 mg/dL (3+) 22     Urine Protein ^ Negative  Negative 22           Initial Prenatal Labs     Test Value Reference Range Date Time    Hemoglobin  12.4 g/dL 12.0 - 15.9 22 0646       13.3 g/dL 12.0 - 15.9 21 1026       15.6 g/dL 11.1 - 15.9 21 1527    Hematocrit  36.4 % 34.0 - 46.6 22 0646       39.6 % 34.0 - 46.6 21 1026       45.5 % 34.0 - 46.6 21 1527    Platelets  243 10*3/mm3 140 - 450 22 0646       336 x10E3/uL 150 - 450 21 1527    Rubella IgG  14.30 index Immune >0.99 21 1527    Hepatitis B SAg  Negative  Negative 21 1527    Hepatitis C Ab  <0.1 s/co ratio 0.0 - 0.9 21 1527    RPR  Non Reactive  Non Reactive 21 1527    ABO  AB   22 0646    Rh  Positive   22 0646    Antibody Screen  Negative   22 0646       Negative  Negative 21 1527    HIV  Non Reactive  Non Reactive 21 1527    Urine Culture  Final report   21 1011        Final report   05/18/21 1541    Gonorrhea  Negative  Negative 05/18/21 1542    Chlamydia  Negative  Negative 05/18/21 1542    TSH        HgB A1c               2nd and 3rd Trimester     Test Value Reference Range Date Time    Hemoglobin (repeated)  12.4 g/dL 12.0 - 15.9 01/12/22 0646       13.3 g/dL 12.0 - 15.9 09/30/21 1026    Hematocrit (repeated)  36.4 % 34.0 - 46.6 01/12/22 0646       39.6 % 34.0 - 46.6 09/30/21 1026    Platelets   243 10*3/mm3 140 - 450 01/12/22 0646       336 x10E3/uL 150 - 450 05/18/21 1527    GCT  91 mg/dL 65 - 139 09/30/21 1026    Antibody Screen (repeated)  Negative   01/12/22 0646    GTT Fasting        GTT 1 Hr        GTT 2 Hr        GTT 3 Hr        Group B Strep  Negative  Negative 12/23/21 1048          Drug Screening     Test Value Reference Range Date Time    Amphetamine Screen  Negative ng/mL Qyyvwf=2153 05/18/21 1541    Barbiturate Screen  Negative ng/mL Fxjsgn=209 05/18/21 1541    Benzodiazepine Screen  Negative ng/mL Pwotku=911 05/18/21 1541    Methadone Screen  Negative ng/mL Nmijxx=356 05/18/21 1541    Phencyclidine Screen  Negative ng/mL Cutoff=25 05/18/21 1541    Opiates Screen  Negative ng/mL Lyhzwc=413 05/18/21 1541    THC Screen  Negative ng/mL Cutoff=50 05/18/21 1541    Cocaine Screen  Negative ng/mL Oqdkms=154 05/18/21 1541    Propoxyphene Screen  Negative ng/mL Momlrk=353 05/18/21 1541    Buprenorphine Screen        Methamphetamine Screen        Oxycodone Screen        Tricyclic Antidepressants Screen              Other (Risk screening)     Test Value Reference Range Date Time    Varicella IgG        Parvovirus IgG        CMV IgG        Cystic Fibrosis        Hemoglobin electrophoresis        NIPT        MSAFP-4        AFP (for NTD only)              Legend    ^: Historical                      External Prenatal Results     Pregnancy Outside Results - Transcribed From Office Records - See Scanned Records For Details     Test Value Date Time    ABO  AB  01/12/22 0646     Rh  Positive  22 0646    Antibody Screen  Negative  22 0646       Negative  21 1527    Varicella IgG       Rubella  14.30 index 21 1527    Hgb  12.4 g/dL 22 0646       13.3 g/dL 21 1026       15.6 g/dL 21 1527    Hct  36.4 % 22 0646       39.6 % 21 1026       45.5 % 21 1527    Glucose Fasting GTT       Glucose Tolerance Test 1 hour       Glucose Tolerance Test 3 hour       Gonorrhea (discrete)  Negative  21 1542    Chlamydia (discrete)  Negative  21 1542    RPR  Non Reactive  21 1527    VDRL       Syphilis Antibody       HBsAg  Negative  21 1527    Herpes Simplex Virus PCR       Herpes Simplex VIrus Culture       HIV  Non Reactive  21 1527    Hep C RNA Quant PCR       Hep C Antibody  <0.1 s/co ratio 21 1527    AFP       Group B Strep  Negative  21 1048    GBS Susceptibility to Clindamycin       GBS Susceptibility to Erythromycin       Fetal Fibronectin       Genetic Testing, Maternal Blood             Drug Screening     Test Value Date Time    Urine Drug Screen       Amphetamine Screen  Negative ng/mL 21 1541    Barbiturate Screen  Negative ng/mL 21 1541    Benzodiazepine Screen  Negative ng/mL 21 1541    Methadone Screen  Negative ng/mL 21 1541    Phencyclidine Screen  Negative ng/mL 21 1541    Opiates Screen       THC Screen       Cocaine Screen       Propoxyphene Screen  Negative ng/mL 21 1541    Buprenorphine Screen       Methamphetamine Screen       Oxycodone Screen       Tricyclic Antidepressants Screen             Legend    ^: Historical                         Past OB History:     OB History    Para Term  AB Living   4 2 0 0 1 2   SAB IAB Ectopic Molar Multiple Live Births   1 0 0 0 0 2      # Outcome Date GA Lbr Lemuel/2nd Weight Sex Delivery Anes PTL Lv   4 Current            3 SAB 2019           2 Para 01/22/10   2608 g (5 lb 12 oz) M CS-Unspec   ROSENDO   1  Para 07   3204 g (7 lb 1 oz) F CS-Unspec   ROSENDO       Past Medical History: Past Medical History:   Diagnosis Date   • Asthma       Past Surgical History Past Surgical History:   Procedure Laterality Date   • BREAST AUGMENTATION     •  SECTION        Family History: Family History   Problem Relation Age of Onset   • Diabetes Father    • Hypertension Father       Social History:  reports that she has been smoking cigarettes. She has a 5.00 pack-year smoking history. She has never used smokeless tobacco.   reports previous alcohol use.   reports no history of drug use.        General ROS: Pertinent items are noted in HPI, all other systems reviewed and negative    Objective       Vital Signs Range for the last 24 hours  Temperature: Temp:  [98.7 °F (37.1 °C)] 98.7 °F (37.1 °C)   Temp Source: Temp src: Oral   BP: BP: (130-140)/(85-89) 130/89   Pulse: Heart Rate:  [] 91   Respirations: Resp:  [18] 18   SPO2: SpO2:  [99 %] 99 %   O2 Amount (l/min):     O2 Devices     Weight: Weight:  [71.7 kg (158 lb)-71.8 kg (158 lb 6.4 oz)] 71.7 kg (158 lb)     Physical Examination: General appearance - alert, well appearing, and in no distress and oriented to person, place, and time  Mental status - alert, oriented to person, place, and time  Neck - supple, no significant adenopathy  Chest - clear to auscultation, no wheezes, rales or rhonchi, symmetric air entry  Heart - normal rate, regular rhythm, normal S1, S2, no murmurs, rubs, clicks or gallops  Abdomen - soft, nontender, nondistended, no masses or organomegaly  Fundus consistent with 39-week gestation nontender uterus.  Neurological - alert, oriented, normal speech, no focal findings or movement disorder noted  Extremities - peripheral pulses normal, no pedal edema, no clubbing or cyanosis    Presentation:  Cephalic   Cervix: Exam by:     Dilation:     Effacement:     Station:         Fetal Heart Rate Assessment   Method: Fetal HR Assessment Method: external    Beats/min: Fetal HR (beats/min): 140   Baseline: Fetal Heart Baseline Rate: normal range   Variability: Fetal HR Variability: moderate (amplitude range 6 to 25 bpm)   Accels: Fetal HR Accelerations: greater than/equal to 15 bpm, lasting at least 15 seconds   Decels: Fetal HR Decelerations: absent   Tracing Category:       Uterine Assessment   Method: Method: external tocotransducer, per patient report   Frequency (min):     Ctx Count in 10 min:     Duration:     Intensity: Contraction Intensity: no contractions   Intensity by IUPC:     Resting Tone: Uterine Resting Tone: soft by palpation   Resting Tone by IUPC:     [place] Units:       Laboratory Results: Hemoglobin 12.4  Radiology Review: Fourth ultrasound at 31st percentile on 2021.  Other Studies: None    Assessment/Plan       Pregnancy        Assessment:  1.  Intrauterine pregnancy at 39w0d gestation with reactive, reassuring fetal status.    2. Not in labor  without ROM  3.  Obstetrical history significant for 2 prior  sections.  4.  GBS status:   Strep Gp B Culture   Date Value Ref Range Status   2021 Negative Negative Final     Comment:     Centers for Disease Control and Prevention (CDC) and American Congress  of Obstetricians and Gynecologists (ACOG) guidelines for prevention of   group B streptococcal (GBS) disease specify co-collection of  a vaginal and rectal swab specimen to maximize sensitivity of GBS  detection. Per the CDC and ACOG, swabbing both the lower vagina and  rectum substantially increases the yield of detection compared with  sampling the vagina alone.  Penicillin G, ampicillin, or cefazolin are indicated for intrapartum  prophylaxis of  GBS colonization. Reflex susceptibility  testing should be performed prior to use of clindamycin only on GBS  isolates from penicillin-allergic women who are considered a high risk  for anaphylaxis. Treatment with vancomycin without additional testing  is  warranted if resistance to clindamycin is noted.         Plan:  1. Repeat  scheduled  2. Plan of care has been reviewed with patient and she agrees  3.  Risks, benefits of treatment plan have been discussed.  4.  All questions have been answered.  5. she does not desire permanent sterilization at this time.      Jeffery Cooper MD  2022  08:52 EST

## 2022-01-12 NOTE — ANESTHESIA PREPROCEDURE EVALUATION
Anesthesia Evaluation     Patient summary reviewed and Nursing notes reviewed   NPO Solid Status: > 8 hours             Airway   Mallampati: II  TM distance: >3 FB  Neck ROM: full  no difficulty expected  Dental - normal exam     Pulmonary - normal exam   (+) asthma,  Cardiovascular - negative cardio ROS and normal exam        Neuro/Psych- negative ROS  GI/Hepatic/Renal/Endo - negative ROS     Musculoskeletal (-) negative ROS    Abdominal  - normal exam   Substance History - negative use     OB/GYN    (+) Pregnant,         Other                        Anesthesia Plan    ASA 2     spinal   (39w0d      No duramorph due to severe itching history)    Anesthetic plan, all risks, benefits, and alternatives have been provided, discussed and informed consent has been obtained with: patient.

## 2022-01-13 PROBLEM — Z98.891 S/P CESAREAN SECTION: Status: ACTIVE | Noted: 2022-01-13

## 2022-01-13 LAB
BASOPHILS # BLD AUTO: 0.04 10*3/MM3 (ref 0–0.2)
BASOPHILS NFR BLD AUTO: 0.4 % (ref 0–1.5)
DEPRECATED RDW RBC AUTO: 40.7 FL (ref 37–54)
EOSINOPHIL # BLD AUTO: 0.35 10*3/MM3 (ref 0–0.4)
EOSINOPHIL NFR BLD AUTO: 3.5 % (ref 0.3–6.2)
ERYTHROCYTE [DISTWIDTH] IN BLOOD BY AUTOMATED COUNT: 12.2 % (ref 12.3–15.4)
HCT VFR BLD AUTO: 32.7 % (ref 34–46.6)
HGB BLD-MCNC: 10.7 G/DL (ref 12–15.9)
IMM GRANULOCYTES # BLD AUTO: 0.16 10*3/MM3 (ref 0–0.05)
IMM GRANULOCYTES NFR BLD AUTO: 1.6 % (ref 0–0.5)
LYMPHOCYTES # BLD AUTO: 2.59 10*3/MM3 (ref 0.7–3.1)
LYMPHOCYTES NFR BLD AUTO: 25.6 % (ref 19.6–45.3)
MCH RBC QN AUTO: 29.8 PG (ref 26.6–33)
MCHC RBC AUTO-ENTMCNC: 32.7 G/DL (ref 31.5–35.7)
MCV RBC AUTO: 91.1 FL (ref 79–97)
MONOCYTES # BLD AUTO: 0.79 10*3/MM3 (ref 0.1–0.9)
MONOCYTES NFR BLD AUTO: 7.8 % (ref 5–12)
NEUTROPHILS NFR BLD AUTO: 6.19 10*3/MM3 (ref 1.7–7)
NEUTROPHILS NFR BLD AUTO: 61.1 % (ref 42.7–76)
NRBC BLD AUTO-RTO: 0 /100 WBC (ref 0–0.2)
PLATELET # BLD AUTO: 181 10*3/MM3 (ref 140–450)
PMV BLD AUTO: 10.5 FL (ref 6–12)
RBC # BLD AUTO: 3.59 10*6/MM3 (ref 3.77–5.28)
WBC NRBC COR # BLD: 10.12 10*3/MM3 (ref 3.4–10.8)

## 2022-01-13 PROCEDURE — 25010000002 KETOROLAC TROMETHAMINE PER 15 MG: Performed by: OBSTETRICS & GYNECOLOGY

## 2022-01-13 PROCEDURE — 0503F POSTPARTUM CARE VISIT: CPT | Performed by: OBSTETRICS & GYNECOLOGY

## 2022-01-13 PROCEDURE — 85025 COMPLETE CBC W/AUTO DIFF WBC: CPT | Performed by: OBSTETRICS & GYNECOLOGY

## 2022-01-13 RX ADMIN — KETOROLAC TROMETHAMINE 30 MG: 30 INJECTION, SOLUTION INTRAMUSCULAR; INTRAVENOUS at 02:19

## 2022-01-13 RX ADMIN — IBUPROFEN 800 MG: 800 TABLET, FILM COATED ORAL at 18:33

## 2022-01-13 RX ADMIN — OXYCODONE HYDROCHLORIDE AND ACETAMINOPHEN 1 TABLET: 10; 325 TABLET ORAL at 19:09

## 2022-01-13 RX ADMIN — OXYCODONE HYDROCHLORIDE AND ACETAMINOPHEN 1 TABLET: 10; 325 TABLET ORAL at 02:19

## 2022-01-13 RX ADMIN — OXYCODONE HYDROCHLORIDE AND ACETAMINOPHEN 1 TABLET: 10; 325 TABLET ORAL at 06:18

## 2022-01-13 RX ADMIN — OXYCODONE HYDROCHLORIDE AND ACETAMINOPHEN 1 TABLET: 10; 325 TABLET ORAL at 14:53

## 2022-01-13 RX ADMIN — OXYCODONE HYDROCHLORIDE AND ACETAMINOPHEN 1 TABLET: 10; 325 TABLET ORAL at 23:04

## 2022-01-13 RX ADMIN — IBUPROFEN 800 MG: 800 TABLET, FILM COATED ORAL at 08:44

## 2022-01-13 RX ADMIN — OXYCODONE HYDROCHLORIDE AND ACETAMINOPHEN 1 TABLET: 10; 325 TABLET ORAL at 10:46

## 2022-01-13 NOTE — PLAN OF CARE
Problem: Adult Inpatient Plan of Care  Goal: Plan of Care Review  Outcome: Ongoing, Progressing  Flowsheets (Taken 1/13/2022 0655)  Progress: improving  Plan of Care Reviewed With:   patient   spouse  Outcome Summary: VSS, lochia wnl, pain controlled with po meds, ambulated to bathroom, ritter dc'd, dtv, bottlefeeding  Goal: Patient-Specific Goal (Individualized)  Outcome: Ongoing, Progressing  Goal: Absence of Hospital-Acquired Illness or Injury  Outcome: Ongoing, Progressing  Intervention: Identify and Manage Fall Risk  Recent Flowsheet Documentation  Taken 1/13/2022 0618 by Rajwinder Garner RN  Safety Promotion/Fall Prevention: safety round/check completed  Taken 1/13/2022 0425 by Rajwinder Garner RN  Safety Promotion/Fall Prevention: safety round/check completed  Taken 1/13/2022 0219 by Rajwinder Garner RN  Safety Promotion/Fall Prevention: safety round/check completed  Taken 1/13/2022 0000 by Rajwinder Garner RN  Safety Promotion/Fall Prevention: safety round/check completed  Taken 1/12/2022 2226 by Rajwinder Garner RN  Safety Promotion/Fall Prevention: safety round/check completed  Taken 1/12/2022 2120 by Rajwinder Garner RN  Safety Promotion/Fall Prevention: safety round/check completed  Taken 1/12/2022 2003 by Rajwinder Garner RN  Safety Promotion/Fall Prevention: safety round/check completed  Intervention: Prevent and Manage VTE (venous thromboembolism) Risk  Recent Flowsheet Documentation  Taken 1/12/2022 2003 by Rajwinder Garner RN  VTE Prevention/Management:   bilateral   sequential compression devices on  Goal: Optimal Comfort and Wellbeing  Outcome: Ongoing, Progressing  Intervention: Provide Person-Centered Care  Recent Flowsheet Documentation  Taken 1/12/2022 2003 by Rajwinder Garner RN  Trust Relationship/Rapport:   care explained   thoughts/feelings acknowledged   reassurance provided   questions encouraged    questions answered  Goal: Readiness for Transition of Care  Outcome: Ongoing, Progressing   Goal Outcome Evaluation:  Plan of Care Reviewed With: patient, spouse        Progress: improving  Outcome Summary: VSS, selwyna wnl, pain controlled with po meds, ambulated to bathroom, stone schofield'marybeth, dtv, bottlefeeding

## 2022-01-13 NOTE — PROGRESS NOTES
Section Progress Note    Assessment/Plan     Status post  section: Doing well postoperatively.     1) postpartum care immediately after delivery: Doing well. Routine care- encouraged shower and remove dressing.   2) Transient hypertension - one BP elevation, all others normal. Continue to monitor trend     Rh status: AB positive   Rubella: immune  Gender: Female  COVID ND     Subjective     Postpartum Day 1:  Delivery    The patient feels well. The patient denies emotional concerns. Pain is moderately controlled with current medications. The baby is well.Urinary output is adequate. The patient is ambulating well. The patient is tolerating a normal diet. Patient denies flatus.    Objective     Vital signs in last 24 hours:  Temp:  [97 °F (36.1 °C)-98 °F (36.7 °C)] 98 °F (36.7 °C)  Heart Rate:  [65-87] 71  Resp:  [16-18] 16  BP: (110-145)/(67-95) 125/82      General:    alert, appears stated age and cooperative   Bowel Sounds:  active   Lochia:  appropriate   Uterine Fundus:   firm   Incision:  dressing in place    DVT Evaluation:  No evidence of DVT seen on physical exam.     Lab Results   Component Value Date    WBC 10.12 2022    HGB 10.7 (L) 2022    HCT 32.7 (L) 2022    MCV 91.1 2022     2022         Eleno Heredia MD  2022  10:32 EST

## 2022-01-14 PROCEDURE — 0503F POSTPARTUM CARE VISIT: CPT | Performed by: NURSE PRACTITIONER

## 2022-01-14 RX ADMIN — OXYCODONE HYDROCHLORIDE AND ACETAMINOPHEN 1 TABLET: 10; 325 TABLET ORAL at 11:35

## 2022-01-14 RX ADMIN — IBUPROFEN 800 MG: 800 TABLET, FILM COATED ORAL at 03:22

## 2022-01-14 RX ADMIN — OXYCODONE HYDROCHLORIDE AND ACETAMINOPHEN 1 TABLET: 10; 325 TABLET ORAL at 15:45

## 2022-01-14 RX ADMIN — IBUPROFEN 800 MG: 800 TABLET, FILM COATED ORAL at 20:05

## 2022-01-14 RX ADMIN — OXYCODONE HYDROCHLORIDE AND ACETAMINOPHEN 1 TABLET: 10; 325 TABLET ORAL at 03:22

## 2022-01-14 RX ADMIN — OXYCODONE HYDROCHLORIDE AND ACETAMINOPHEN 1 TABLET: 10; 325 TABLET ORAL at 20:05

## 2022-01-14 RX ADMIN — OXYCODONE HYDROCHLORIDE AND ACETAMINOPHEN 1 TABLET: 10; 325 TABLET ORAL at 07:20

## 2022-01-14 RX ADMIN — IBUPROFEN 800 MG: 800 TABLET, FILM COATED ORAL at 11:35

## 2022-01-14 NOTE — PROGRESS NOTES
Section Progress Note    Assessment/Plan     1. Status post  section: Doing well postoperatively.   Continue current care. Desires to stay until postpartum day 4. Bullae X2 noted after dressing was removed. Likely from dressing adhesive. Both remain intact at this time. Continue to monitor for changes or discomfort. She reports improvement in discomfort since dressing was removed.     2.  Transient Hypertension  One isolated elevation. Normal BP at since this time  Denies HA, vision changes, or RUQ pain    Rh status: AB+  Rubella: Not immune  Gender: Female  Covid: Not detected    Subjective     Postpartum Day 2:  Delivery    The patient feels well. The patient denies emotional concerns. Pain is well controlled with current medications. The baby iswell.Urinary output is adequate. The patient is ambulating well. The patient is tolerating a normal diet. Patient reports passing flatus.    Objective     Vital signs in last 24 hours:  Temp:  [97.4 °F (36.3 °C)-97.6 °F (36.4 °C)] 97.4 °F (36.3 °C)  Heart Rate:  [65-76] 68  Resp:  [16] 16  BP: (111-134)/(73-83) 111/73      General:    alert, appears stated age and cooperative   CV: RRR, no m/r/g   Lungs: CTAB, no wheezes, no respiratory distress   Bowel Sounds:  active   Lochia:  appropriate   Uterine Fundus:   firm   Incision:  healing well, no significant drainage, no dehiscence, no significant erythema, 2 bullae noted, one located left side of incision approximately 2 cm a second is located on right side of incision approx 1 cm   DVT Evaluation:  No evidence of DVT seen on physical exam.     Lab Results   Component Value Date    WBC 10.12 2022    HGB 10.7 (L) 2022    HCT 32.7 (L) 2022    MCV 91.1 2022     2022         Elida Ortiz, APRN  2022  08:39 EST

## 2022-01-14 NOTE — PLAN OF CARE
Problem: Adult Inpatient Plan of Care  Goal: Plan of Care Review  Outcome: Ongoing, Progressing  Flowsheets (Taken 1/14/2022 0654)  Progress: improving  Plan of Care Reviewed With: patient  Outcome Summary: VSS, pain controlled with po meds, up ad jose, bottlefeeding  Goal: Patient-Specific Goal (Individualized)  Outcome: Ongoing, Progressing  Goal: Absence of Hospital-Acquired Illness or Injury  Outcome: Ongoing, Progressing  Intervention: Identify and Manage Fall Risk  Recent Flowsheet Documentation  Taken 1/14/2022 0630 by Rajwinder Garner RN  Safety Promotion/Fall Prevention: safety round/check completed  Taken 1/14/2022 0415 by Rajwinder Garner RN  Safety Promotion/Fall Prevention: safety round/check completed  Taken 1/14/2022 0322 by Rajwinder Garner RN  Safety Promotion/Fall Prevention: safety round/check completed  Taken 1/14/2022 0200 by Rajwinder Garner RN  Safety Promotion/Fall Prevention: safety round/check completed  Taken 1/14/2022 0055 by Rajwinder Garner RN  Safety Promotion/Fall Prevention: safety round/check completed  Taken 1/13/2022 2304 by Rajwinder Garner RN  Safety Promotion/Fall Prevention: safety round/check completed  Taken 1/13/2022 2205 by Rajwinder Garner RN  Safety Promotion/Fall Prevention: safety round/check completed  Taken 1/13/2022 1909 by Rajwinder Garner RN  Safety Promotion/Fall Prevention: safety round/check completed  Goal: Optimal Comfort and Wellbeing  Outcome: Ongoing, Progressing  Intervention: Provide Person-Centered Care  Recent Flowsheet Documentation  Taken 1/13/2022 2205 by Rajwinder Garner RN  Trust Relationship/Rapport: care explained  Goal: Readiness for Transition of Care  Outcome: Ongoing, Progressing   Goal Outcome Evaluation:  Plan of Care Reviewed With: patient        Progress: improving  Outcome Summary: VSS, pain controlled with po meds, up ad jose, bottlefeeding

## 2022-01-15 PROCEDURE — 0503F POSTPARTUM CARE VISIT: CPT | Performed by: OBSTETRICS & GYNECOLOGY

## 2022-01-15 RX ADMIN — IBUPROFEN 800 MG: 800 TABLET, FILM COATED ORAL at 04:09

## 2022-01-15 RX ADMIN — OXYCODONE HYDROCHLORIDE AND ACETAMINOPHEN 1 TABLET: 10; 325 TABLET ORAL at 04:09

## 2022-01-15 RX ADMIN — IBUPROFEN 800 MG: 800 TABLET, FILM COATED ORAL at 13:18

## 2022-01-15 RX ADMIN — OXYCODONE HYDROCHLORIDE AND ACETAMINOPHEN 1 TABLET: 10; 325 TABLET ORAL at 08:52

## 2022-01-15 RX ADMIN — OXYCODONE HYDROCHLORIDE AND ACETAMINOPHEN 1 TABLET: 10; 325 TABLET ORAL at 17:55

## 2022-01-15 RX ADMIN — OXYCODONE HYDROCHLORIDE AND ACETAMINOPHEN 1 TABLET: 10; 325 TABLET ORAL at 00:10

## 2022-01-15 RX ADMIN — Medication 1 TABLET: at 08:52

## 2022-01-15 RX ADMIN — IBUPROFEN 800 MG: 800 TABLET, FILM COATED ORAL at 22:05

## 2022-01-15 RX ADMIN — OXYCODONE HYDROCHLORIDE AND ACETAMINOPHEN 1 TABLET: 10; 325 TABLET ORAL at 22:05

## 2022-01-15 RX ADMIN — OXYCODONE HYDROCHLORIDE AND ACETAMINOPHEN 1 TABLET: 10; 325 TABLET ORAL at 13:19

## 2022-01-15 NOTE — PROGRESS NOTES
Assessment/Plan:  Status post  section. Doing well postoperatively.     Continue current care.  Desiring discharge home tomorrow on postop day 4    Subjective:  Postpartum Day 3:  Delivery    The patient feels well.  Pain is well controlled with current medications. The baby iswell. . Urinary output is adequate. The patient is ambulating well. The patient is tolerating a normal diet. Flatus has been passed.    Objective:  Vital signs in last 24 hours:  Temp:  [97.3 °F (36.3 °C)-98 °F (36.7 °C)] 97.8 °F (36.6 °C)  Heart Rate:  [75-85] 85  Resp:  [16-18] 18  BP: (125-131)/(78-86) 131/86      General:    alert, appears stated age and cooperative   Bowel Sounds:  active + bowel movement.   Lochia:  appropriate   Uterine Fundus:   firm   Incision:  healing well, no significant drainage, no dehiscence, no significant erythema   DVT Evaluation:  No evidence of DVT seen on physical exam.       Female baby.  Blood type: AB +    Rubella: Immuine

## 2022-01-15 NOTE — PLAN OF CARE
Goal Outcome Evaluation:  Plan of Care Reviewed With: patient        Progress: improving   Vital signs stable. Pain is controlled with po medication. Up ad jose. Voiding without difficulty. Tolerating regular diet.

## 2022-01-16 VITALS
RESPIRATION RATE: 18 BRPM | HEIGHT: 61 IN | HEART RATE: 79 BPM | DIASTOLIC BLOOD PRESSURE: 84 MMHG | SYSTOLIC BLOOD PRESSURE: 137 MMHG | TEMPERATURE: 98.2 F | BODY MASS INDEX: 29.83 KG/M2 | OXYGEN SATURATION: 97 % | WEIGHT: 158 LBS

## 2022-01-16 PROCEDURE — 0503F POSTPARTUM CARE VISIT: CPT | Performed by: OBSTETRICS & GYNECOLOGY

## 2022-01-16 RX ORDER — OXYCODONE AND ACETAMINOPHEN 10; 325 MG/1; MG/1
1 TABLET ORAL EVERY 4 HOURS PRN
Qty: 20 TABLET | Refills: 0 | Status: SHIPPED | OUTPATIENT
Start: 2022-01-16 | End: 2022-01-19

## 2022-01-16 RX ORDER — IBUPROFEN 800 MG/1
800 TABLET ORAL EVERY 8 HOURS PRN
Qty: 40 TABLET | Refills: 1 | Status: SHIPPED | OUTPATIENT
Start: 2022-01-16 | End: 2022-02-07

## 2022-01-16 RX ADMIN — OXYCODONE HYDROCHLORIDE AND ACETAMINOPHEN 1 TABLET: 10; 325 TABLET ORAL at 02:03

## 2022-01-16 RX ADMIN — IBUPROFEN 800 MG: 800 TABLET, FILM COATED ORAL at 06:26

## 2022-01-16 RX ADMIN — OXYCODONE HYDROCHLORIDE AND ACETAMINOPHEN 1 TABLET: 10; 325 TABLET ORAL at 06:26

## 2022-01-16 RX ADMIN — OXYCODONE HYDROCHLORIDE AND ACETAMINOPHEN 1 TABLET: 10; 325 TABLET ORAL at 10:45

## 2022-01-16 NOTE — PLAN OF CARE
Goal Outcome Evaluation:  Plan of Care Reviewed With: patient      VS stable. Abd incision approximated and healing. Ambulates in halls. Pain controlled with prn po medication. Fundus firm. Without excessive lochia.+ bonding with ,

## 2022-01-16 NOTE — DISCHARGE INSTRUCTIONS
Routine  instructions.  No driving for 1 week.  Nothing in vagina for 6 weeks.  Recommend appointment with me for incision check in 2 weeks.

## 2022-01-16 NOTE — DISCHARGE SUMMARY
Date of Discharge:  2022    Discharge Diagnosis: Repeat     Presenting Problem/History of Present Illness  Active Hospital Problems    Diagnosis  POA   • **S/P  section [Z98.891]  Not Applicable   • Pregnancy [Z34.90]  Not Applicable      Resolved Hospital Problems   No resolved problems to display.          Hospital Course  Patient is a 31 y.o. female presented with repeat  at 39 weeks gestation.  Please see separate history and physical and operative summary for details.  She did well during her recovery and had good return of bowel and bladder function.  She is ambulating, voiding, tolerating oral intake..  Postop hemoglobin was 10.7 down from 12.4.  She stable on postop day 4 for discharge home.    Procedures Performed    Procedure(s):   SECTION REPEAT  -------------------       Consults:   Consults     Date and Time Order Name Status Description    2022  6:19 AM Inpatient Anesthesiology Consult            Pertinent Test Results: labs: Hemoglobin 10.7 postoperatively    Condition on Discharge: Stable    Vital Signs  Temp:  [98.1 °F (36.7 °C)-98.2 °F (36.8 °C)] 98.2 °F (36.8 °C)  Heart Rate:  [76-79] 79  Resp:  [18] 18  BP: (131-141)/(84-89) 137/84    Physical Exam:     General Appearance:    Alert, cooperative, in no acute distress   Head:    Normocephalic, without obvious abnormality, atraumatic   Eyes:            Lids and lashes normal, conjunctivae and sclerae normal, no   icterus, no pallor, corneas clear, PERRLA   Ears:    Ears appear intact with no abnormalities noted   Throat:   No oral lesions, no thrush, oral mucosa moist   Neck:   No adenopathy, supple, trachea midline, no thyromegaly, no     carotid bruit, no JVD   Back:     No kyphosis present, no scoliosis present, no skin lesions,       erythema or scars, no tenderness to percussion or                   palpation,   range of motion normal   Lungs:     Clear to auscultation,respirations regular, even and                    unlabored    Heart:    Regular rhythm and normal rate, normal S1 and S2, no            murmur, no gallop, no rub, no click   Breast Exam:    Deferred   Abdomen:     Normal bowel sounds, no masses, no organomegaly, soft        non-tender, non-distended, no guarding, no rebound                 tenderness   Genitalia:    Deferred   Extremities:   Moves all extremities well, no edema, no cyanosis, no              redness   Pulses:   Pulses palpable and equal bilaterally   Skin:   No bleeding, bruising or rash   Lymph nodes:   No palpable adenopathy   Neurologic:   Cranial nerves 2 - 12 grossly intact, sensation intact, DTR        present and equal bilaterally       Discharge Disposition  Home or Self Care    Discharge Medications     Discharge Medications      New Medications      Instructions Start Date   ibuprofen 800 MG tablet  Commonly known as: ADVIL,MOTRIN   800 mg, Oral, Every 8 Hours PRN      oxyCODONE-acetaminophen  MG per tablet  Commonly known as: PERCOCET   1 tablet, Oral, Every 4 Hours PRN         Continue These Medications      Instructions Start Date   famotidine 20 MG tablet  Commonly known as: PEPCID   TAKE 1 TABLET BY MOUTH TWICE DAILY AS NEEDED FOR HEARTBURN      ondansetron 4 MG tablet  Commonly known as: ZOFRAN   4 mg, Oral, Every 8 Hours PRN      Prenatal Multivitamin + DHA 28-0.8 & 200 MG misc   1 tablet, Oral, Daily      ProAir  (90 Base) MCG/ACT inhaler  Generic drug: albuterol sulfate HFA   2 puffs, Every 4 Hours PRN, for wheezing             Discharge Diet:     Activity at Discharge:     Follow-up Appointments  No future appointments.      Test Results Pending at Discharge       Jeffery Cooper MD  01/16/22  11:28 EST    Time: .

## 2022-01-16 NOTE — PLAN OF CARE
Goal Outcome Evaluation:  Plan of Care Reviewed With: patient        Progress: improving   Vitals signs stable. Pain is controlled with po medication. Voiding without difficulty. Tolerating regular diet. Ambulates in hallways. Pt anticipates discharge home today.

## 2022-01-17 NOTE — PAYOR COMM NOTE
"Kirill Black (31 y.o. Female)     [facility]    [address] [place] [address]  Facility NPI: [id number]    [facility]  Fax: [phone]  Phone: [phone] (Cinthia: 9436)    Subject: CLINICALS SUPPORTING INPT ADMISSION MATERNITY D/C SUMM   Reference #: [id number]    Please don't hesitate to contact me with any questions or concerns.              Date of Birth Social Security Number Address Home Phone MRN    [id number]  [id number] [address] [phone] [id number]    Druze Marital Status             None        Admission Date Admission Type Admitting Provider Attending Provider Department, Room/Bed    22 Elective Jeffery Cooper MD  [facility], E364/    Discharge Date Discharge Disposition Discharge Destination          2022 Home or Self Care              Attending Provider: (none)   Allergies: Lisinopril, Sulfamethoxazole-trimethoprim, Morphine    Isolation: None   Infection: None   Code Status: Prior   Advance Care Planning Activity    Ht: 154.9 cm (61\")   Wt: 71.7 kg (158 lb)    Admission Cmt: None   Principal Problem: S/P  section [Z98.891]                 Active Insurance as of 2022     Primary Coverage     Payor Plan Insurance Group Employer/Plan Group    [facility] COMMUNITY PLAN [facility] COMMUNITY PLAN [facility]     Payor Plan Address Payor Plan Phone Number Payor Plan Fax Number Effective Dates    [address]   2021 - None Entered    [facility] [id number]       Subscriber Name Subscriber Birth Date Member ID       KIRILL BLACK 1990 [id number]                 Emergency Contacts      (Rel.) Home Phone Work Phone Mobile Phone    CONTACT,NO (Other) -- -- [phone]               History & Physical      Jeffery Cooper MD at 22 [address] [place] [address]  Obstetric History and Physical    Chief Complaint   Patient presents with   • Scheduled      " 39 wk elective        Subjective     Patient is a 31 y.o. female  currently at 39w0d, who presents with 39-week gestation for scheduled repeat .  She has had 2 prior  sections.  She has had uneventful prenatal course and had normal NIPT.  She has noted normal fetal movement and no fluid leakage or bleeding prior to admission..    Her prenatal care is benign.  Her previous obstetric/gynecological history is noted for is remarkable for 2 prior  sections.    The following portions of the patients history were reviewed and updated as appropriate: current medications, allergies, past medical history, past surgical history, past family history, past social history and problem list .       Prenatal Information:  Prenatal Results     POC Urine Glucose/Protein     Test Value Reference Range Date Time    Urine Glucose ^ Negative  Negative, 1000 mg/dL (3+) 22     Urine Protein ^ Negative  Negative 22           Initial Prenatal Labs     Test Value Reference Range Date Time    Hemoglobin  12.4 g/dL 12.0 - 15.9 22 0646       13.3 g/dL 12.0 - 15.9 21 1026       15.6 g/dL 11.1 - 15.9 21 1527    Hematocrit  36.4 % 34.0 - 46.6 22 0646       39.6 % 34.0 - 46.6 21 1026       45.5 % 34.0 - 46.6 21 1527    Platelets  243 10*3/mm3 140 - 450 22 0646       336 x10E3/uL 150 - 450 21 1527    Rubella IgG  14.30 index Immune >0.99 21 1527    Hepatitis B SAg  Negative  Negative 21 1527    Hepatitis C Ab  <0.1 s/co ratio 0.0 - 0.9 21 1527    RPR  Non Reactive  Non Reactive 21 1527    ABO  AB   22 0646    Rh  Positive   22 0646    Antibody Screen  Negative   22 0646       Negative  Negative 21 1527    HIV  Non Reactive  Non Reactive 21 1527    Urine Culture  Final report   21 1011       Final report   21 1541    Gonorrhea  Negative  Negative 21 1542    Chlamydia  Negative  Negative  05/18/21 1542    TSH        HgB A1c               2nd and 3rd Trimester     Test Value Reference Range Date Time    Hemoglobin (repeated)  12.4 g/dL 12.0 - 15.9 01/12/22 0646       13.3 g/dL 12.0 - 15.9 09/30/21 1026    Hematocrit (repeated)  36.4 % 34.0 - 46.6 01/12/22 0646       39.6 % 34.0 - 46.6 09/30/21 1026    Platelets   243 10*3/mm3 140 - 450 01/12/22 0646       336 x10E3/uL 150 - 450 05/18/21 1527    GCT  91 mg/dL 65 - 139 09/30/21 1026    Antibody Screen (repeated)  Negative   01/12/22 0646    GTT Fasting        GTT 1 Hr        GTT 2 Hr        GTT 3 Hr        Group B Strep  Negative  Negative 12/23/21 1048          Drug Screening     Test Value Reference Range Date Time    Amphetamine Screen  Negative ng/mL Maqpdc=0147 05/18/21 1541    Barbiturate Screen  Negative ng/mL Niyxfx=819 05/18/21 1541    Benzodiazepine Screen  Negative ng/mL Jwoscr=355 05/18/21 1541    Methadone Screen  Negative ng/mL Yoxprh=284 05/18/21 1541    Phencyclidine Screen  Negative ng/mL Cutoff=25 05/18/21 1541    Opiates Screen  Negative ng/mL Ofnder=429 05/18/21 1541    THC Screen  Negative ng/mL Cutoff=50 05/18/21 1541    Cocaine Screen  Negative ng/mL Miaaaa=932 05/18/21 1541    Propoxyphene Screen  Negative ng/mL Wgaurf=945 05/18/21 1541    Buprenorphine Screen        Methamphetamine Screen        Oxycodone Screen        Tricyclic Antidepressants Screen              Other (Risk screening)     Test Value Reference Range Date Time    Varicella IgG        Parvovirus IgG        CMV IgG        Cystic Fibrosis        Hemoglobin electrophoresis        NIPT        MSAFP-4        AFP (for NTD only)              Legend    ^: Historical                      External Prenatal Results     Pregnancy Outside Results - Transcribed From Office Records - See Scanned Records For Details     Test Value Date Time    ABO  AB  01/12/22 0646    Rh  Positive  01/12/22 0646    Antibody Screen  Negative  01/12/22 0646       Negative  05/18/21 1527     Varicella IgG       Rubella  14.30 index 21 1527    Hgb  12.4 g/dL 22 0646       13.3 g/dL 21 1026       15.6 g/dL 21 1527    Hct  36.4 % 22 0646       39.6 % 21 1026       45.5 % 21 1527    Glucose Fasting GTT       Glucose Tolerance Test 1 hour       Glucose Tolerance Test 3 hour       Gonorrhea (discrete)  Negative  21 1542    Chlamydia (discrete)  Negative  21 1542    RPR  Non Reactive  21 1527    VDRL       Syphilis Antibody       HBsAg  Negative  21 1527    Herpes Simplex Virus PCR       Herpes Simplex VIrus Culture       HIV  Non Reactive  21 1527    Hep C RNA Quant PCR       Hep C Antibody  <0.1 s/co ratio 21 1527    AFP       Group B Strep  Negative  21 1048    GBS Susceptibility to Clindamycin       GBS Susceptibility to Erythromycin       Fetal Fibronectin       Genetic Testing, Maternal Blood             Drug Screening     Test Value Date Time    Urine Drug Screen       Amphetamine Screen  Negative ng/mL 21 1541    Barbiturate Screen  Negative ng/mL 21 1541    Benzodiazepine Screen  Negative ng/mL 21 1541    Methadone Screen  Negative ng/mL 21 1541    Phencyclidine Screen  Negative ng/mL 21 1541    Opiates Screen       THC Screen       Cocaine Screen       Propoxyphene Screen  Negative ng/mL 21 1541    Buprenorphine Screen       Methamphetamine Screen       Oxycodone Screen       Tricyclic Antidepressants Screen             Legend    ^: Historical                         Past OB History:     OB History    Para Term  AB Living   4 2 0 0 1 2   SAB IAB Ectopic Molar Multiple Live Births   1 0 0 0 0 2      # Outcome Date GA Lbr Lemuel/2nd Weight Sex Delivery Anes PTL Lv   4 Current            3 SAB 2019           2 Para 01/22/10   2608 g (5 lb 12 oz) M CS-Unspec   ROSENDO   1 Para 07   3204 g (7 lb 1 oz) F CS-Unspec   ROSENDO       Past Medical History: Past Medical History:    Diagnosis Date   • Asthma       Past Surgical History Past Surgical History:   Procedure Laterality Date   • BREAST AUGMENTATION     •  SECTION        Family History: Family History   Problem Relation Age of Onset   • Diabetes Father    • Hypertension Father       Social History:  reports that she has been smoking cigarettes. She has a 5.00 pack-year smoking history. She has never used smokeless tobacco.   reports previous alcohol use.   reports no history of drug use.        General ROS: Pertinent items are noted in HPI, all other systems reviewed and negative    Objective       Vital Signs Range for the last 24 hours  Temperature: Temp:  [98.7 °F (37.1 °C)] 98.7 °F (37.1 °C)   Temp Source: Temp src: Oral   BP: BP: (130-140)/(85-89) 130/89   Pulse: Heart Rate:  [] 91   Respirations: Resp:  [18] 18   SPO2: SpO2:  [99 %] 99 %   O2 Amount (l/min):     O2 Devices     Weight: Weight:  [71.7 kg (158 lb)-71.8 kg (158 lb 6.4 oz)] 71.7 kg (158 lb)     Physical Examination: General appearance - alert, well appearing, and in no distress and oriented to person, place, and time  Mental status - alert, oriented to person, place, and time  Neck - supple, no significant adenopathy  Chest - clear to auscultation, no wheezes, rales or rhonchi, symmetric air entry  Heart - normal rate, regular rhythm, normal S1, S2, no murmurs, rubs, clicks or gallops  Abdomen - soft, nontender, nondistended, no masses or organomegaly  Fundus consistent with 39-week gestation nontender uterus.  Neurological - alert, oriented, normal speech, no focal findings or movement disorder noted  Extremities - peripheral pulses normal, no pedal edema, no clubbing or cyanosis    Presentation:  Cephalic   Cervix: Exam by:     Dilation:     Effacement:     Station:         Fetal Heart Rate Assessment   Method: Fetal HR Assessment Method: external   Beats/min: Fetal HR (beats/min): 140   Baseline: Fetal Heart Baseline Rate: normal range    Variability: Fetal HR Variability: moderate (amplitude range 6 to 25 bpm)   Accels: Fetal HR Accelerations: greater than/equal to 15 bpm, lasting at least 15 seconds   Decels: Fetal HR Decelerations: absent   Tracing Category:       Uterine Assessment   Method: Method: external tocotransducer, per patient report   Frequency (min):     Ctx Count in 10 min:     Duration:     Intensity: Contraction Intensity: no contractions   Intensity by IUPC:     Resting Tone: Uterine Resting Tone: soft by palpation   Resting Tone by IUPC:     [place] Units:       Laboratory Results: Hemoglobin 12.4  Radiology Review: Fourth ultrasound at 31st percentile on 2021.  Other Studies: None    Assessment/Plan       Pregnancy        Assessment:  1.  Intrauterine pregnancy at 39w0d gestation with reactive, reassuring fetal status.    2. Not in labor  without ROM  3.  Obstetrical history significant for 2 prior  sections.  4.  GBS status:   Strep Gp B Culture   Date Value Ref Range Status   2021 Negative Negative Final     Comment:     Centers for Disease Control and Prevention (CDC) and American Congress  of Obstetricians and Gynecologists (ACOG) guidelines for prevention of   group B streptococcal (GBS) disease specify co-collection of  a vaginal and rectal swab specimen to maximize sensitivity of GBS  detection. Per the CDC and ACOG, swabbing both the lower vagina and  rectum substantially increases the yield of detection compared with  sampling the vagina alone.  Penicillin G, ampicillin, or cefazolin are indicated for intrapartum  prophylaxis of  GBS colonization. Reflex susceptibility  testing should be performed prior to use of clindamycin only on GBS  isolates from penicillin-allergic women who are considered a high risk  for anaphylaxis. Treatment with vancomycin without additional testing  is warranted if resistance to clindamycin is noted.         Plan:  1. Repeat  scheduled  2.  Plan of care has been reviewed with patient and she agrees  3.  Risks, benefits of treatment plan have been discussed.  4.  All questions have been answered.  5. she does not desire permanent sterilization at this time.      Jeffery Cooper MD  2022  08:52 EST    Electronically signed by Jeffery Cooper MD at 22 0855         Facility-Administered Medications as of 2022   Medication Dose Route Frequency Provider Last Rate Last Admin   • [COMPLETED] acetaminophen (TYLENOL) tablet 1,000 mg  1,000 mg Oral Once Javier Carrera MD   1,000 mg at 22 0839   • [COMPLETED] ceFAZolin in dextrose (ANCEF) IVPB solution 2 g  2 g Intravenous Once Jeffery Cooper MD   Stopped at 22 0934   • [] erythromycin (ROMYCIN) 5 MG/GM ophthalmic ointment  - ADS Override Pull            • [COMPLETED] famotidine (PEPCID) injection 20 mg  20 mg Intravenous Once PRN Javier Carerra MD   20 mg at 22 0839   • [] HYDROmorphone (DILAUDID) injection 0.5 mg  0.5 mg Intravenous Q2H PRN Lincoln Forrest MD       • [] ketorolac (TORADOL) injection 30 mg  30 mg Intravenous Q6H PRN Lincoln Forrest MD   30 mg at 22 0219   • [COMPLETED] lactated ringers bolus 1,000 mL  1,000 mL Intravenous Once Jeffery Cooper MD   Stopped at 22 0754   • [COMPLETED] ondansetron (ZOFRAN) injection 4 mg  4 mg Intravenous Once PRN Javier Carrera MD   4 mg at 22 0839   • [COMPLETED] oxyCODONE-acetaminophen (PERCOCET) 5-325 MG per tablet 1 tablet  1 tablet Oral Once Jeffery Cooper MD   1 tablet at 22 1417   • [COMPLETED] oxytocin in sodium chloride (PITOCIN) 30 UNIT/500ML infusion solution  - ADS Override Pull      125 mL/hr at 22 1120 125 mL/hr at 22 1120   • [COMPLETED] oxytocin in sodium chloride (PITOCIN) 30 UNIT/500ML infusion solution  999 mL/hr Intravenous Once Jeffery Cooper  mL/hr at 22 250 mL/hr at 22    Followed by   • [] oxytocin  in sodium chloride (PITOCIN) 30 UNIT/500ML infusion solution  250 mL/hr Intravenous Continuous PRN Jeffery Cooper MD        Followed by   • [COMPLETED] oxytocin in sodium chloride (PITOCIN) 30 UNIT/500ML infusion solution  125 mL/hr Intravenous Continuous PRN Jeffery Cooper  mL/hr at 22 1120 125 mL/hr at 22 1120   • [] phytonadione (VITAMIN K) 1 MG/0.5ML injection  - ADS Override Pull              Lab Results (last 72 hours)     ** No results found for the last 72 hours. **        Imaging Results (Last 72 Hours)     ** No results found for the last 72 hours. **        ECG/EMG Results (last 72 hours)     ** No results found for the last 72 hours. **        Orders (last 72 hrs)      Start     Ordered    22 1143  Discontinue IV  Continuous,   Status:  Canceled         22 1142    22 1127  Discharge patient  Once         22 1127    22 0000  oxyCODONE-acetaminophen (PERCOCET)  MG per tablet  Every 4 Hours PRN         22 1127    22 0000  ibuprofen (ADVIL,MOTRIN) 800 MG tablet  Every 8 Hours PRN         22 1127    22 0600  Bladder Scan if Patient Unable to Void 4-6 Hours After Catheter Removal  As Needed,   Status:  Canceled         22 1300    22 0000  ibuprofen (ADVIL,MOTRIN) tablet 800 mg  Every 8 Hours PRN,   Status:  Discontinued         22 1332    22 1800  Ambulate Patient  2 Times Daily,   Status:  Canceled      Comments: After anesthesia wears off.    22 1300    22 1400  prenatal vitamin tablet 1 tablet  Daily,   Status:  Discontinued         22 1300    22 1325  oxyCODONE-acetaminophen (PERCOCET)  MG per tablet 1 tablet  Every 4 Hours PRN,   Status:  Discontinued         22 1327    22 1300  naloxone (NARCAN) injection 0.2 mg  Every 15 Minutes PRN,   Status:  Discontinued         22 1300    22 1300  I/O  Every Shift,   Status:  Canceled       22  "1300    01/12/22 1259  HYDROcodone-acetaminophen (NORCO) 5-325 MG per tablet 1 tablet  Every 4 Hours PRN,   Status:  Discontinued         01/12/22 1300    01/12/22 1259  ondansetron (ZOFRAN) tablet 4 mg  Every 8 Hours PRN,   Status:  Discontinued         01/12/22 1300    01/12/22 1259  lanolin topical 1 application  Every 1 Hour PRN,   Status:  Discontinued         01/12/22 1300    01/12/22 1259  all purpose nipple ointment 1 application  4 Times Daily PRN,   Status:  Discontinued         01/12/22 1300    01/12/22 1259  aluminum-magnesium hydroxide-simethicone (MAALOX MAX) 400-400-40 MG/5ML suspension 15 mL  Every 4 Hours PRN,   Status:  Discontinued        \"Or\" Linked Group Details    01/12/22 1300    01/12/22 1259  calcium carbonate (TUMS) chewable tablet 500 mg (200 mg elemental)  Every 4 Hours PRN,   Status:  Discontinued        \"Or\" Linked Group Details    01/12/22 1300    01/12/22 1259  Up with Assistance  As Needed,   Status:  Canceled       01/12/22 1300    01/12/22 1259  Straight Cath Every 4-6 Hours As Needed If Patient is Unable to Void After 4-6 Hours, Bladder Scan Volume is Greater Than 500mL & Patient Has Symptoms of Bladder Discomfort / Distention  As Needed,   Status:  Canceled       01/12/22 1300    01/12/22 1259  Consult Pharmacist For Review of Medications That May Cause Urinary Retention - RN To Place Order for Consult it Needed  As Needed,   Status:  Canceled       01/12/22 1300    01/12/22 1259  Schedule / Prompt Voiding For Patients With Urinary Incontinence  As Needed,   Status:  Canceled       01/12/22 1300    01/12/22 1259  Apply witch hazel pads / TUCKS to perineum as needed for comfort PRN  As Needed,   Status:  Canceled       01/12/22 1300    01/12/22 1259  Warm compress  As Needed,   Status:  Canceled       01/12/22 1300    01/12/22 1259  Apply ace wrap, tight bra, or binder  As Needed,   Status:  Canceled       01/12/22 1300    01/12/22 1259  Apply ice packs  As Needed,   Status:  " "Canceled       22 1300    22 1155  oxyCODONE-acetaminophen (PERCOCET) 5-325 MG per tablet 1 tablet  Every 4 Hours PRN,   Status:  Discontinued         22 1155    22 1030  lactated ringers infusion  Continuous,   Status:  Discontinued         22 0943    22 0944  Fundal & Lochia Check  Every Shift,   Status:  Canceled       22 0943    22 0943  ondansetron (ZOFRAN) injection 4 mg  Once As Needed,   Status:  Discontinued         22 0943    22 0943  diphenhydrAMINE (BENADRYL) capsule 25 mg  Every 4 Hours PRN,   Status:  Discontinued        \"Or\" Linked Group Details    22 0943    22 0943  diphenhydrAMINE (BENADRYL) injection 25 mg  Every 4 Hours PRN,   Status:  Discontinued        \"Or\" Linked Group Details    22 0943    22 0943  diphenhydrAMINE (BENADRYL) injection 25 mg  Every 4 Hours PRN,   Status:  Discontinued        \"Or\" Linked Group Details    22 0943    22 0943  Blood Gas, Arterial -  As Needed,   Status:  Canceled       22 0943    22 0715  lactated ringers infusion  Continuous,   Status:  Discontinued         22 0619                   Physician Progress Notes (last 72 hours)      Javier Barr MD at 01/15/22 1105              Assessment/Plan:  Status post  section. Doing well postoperatively.     Continue current care.  Desiring discharge home tomorrow on postop day 4    Subjective:  Postpartum Day 3:  Delivery    The patient feels well.  Pain is well controlled with current medications. The baby iswell. . Urinary output is adequate. The patient is ambulating well. The patient is tolerating a normal diet. Flatus has been passed.    Objective:  Vital signs in last 24 hours:  Temp:  [97.3 °F (36.3 °C)-98 °F (36.7 °C)] 97.8 °F (36.6 °C)  Heart Rate:  [75-85] 85  Resp:  [16-18] 18  BP: (125-131)/(78-86) 131/86      General:    alert, appears stated age and cooperative   Bowel Sounds:  " active + bowel movement.   Lochia:  appropriate   Uterine Fundus:   firm   Incision:  healing well, no significant drainage, no dehiscence, no significant erythema   DVT Evaluation:  No evidence of DVT seen on physical exam.       Female baby.  Blood type: AB +    Rubella: Immuine        Electronically signed by Javier Barr MD at 01/15/22 1108       Consult Notes (last 72 hours)  Notes from 22 1334 through 22 1334   No notes of this type exist for this encounter.            Discharge Summary      Jeffery Cooper MD at 22 1128            Date of Discharge:  2022    Discharge Diagnosis: Repeat     Presenting Problem/History of Present Illness  Active Hospital Problems    Diagnosis  POA   • **S/P  section [Z98.891]  Not Applicable   • Pregnancy [Z34.90]  Not Applicable      Resolved Hospital Problems   No resolved problems to display.          Hospital Course  Patient is a 31 y.o. female presented with repeat  at 39 weeks gestation.  Please see separate history and physical and operative summary for details.  She did well during her recovery and had good return of bowel and bladder function.  She is ambulating, voiding, tolerating oral intake..  Postop hemoglobin was 10.7 down from 12.4.  She stable on postop day 4 for discharge home.    Procedures Performed    Procedure(s):   SECTION REPEAT  -------------------       Consults:   Consults     Date and Time Order Name Status Description    2022  6:19 AM Inpatient Anesthesiology Consult            Pertinent Test Results: labs: Hemoglobin 10.7 postoperatively    Condition on Discharge: Stable    Vital Signs  Temp:  [98.1 °F (36.7 °C)-98.2 °F (36.8 °C)] 98.2 °F (36.8 °C)  Heart Rate:  [76-79] 79  Resp:  [18] 18  BP: (131-141)/(84-89) 137/84    Physical Exam:     General Appearance:    Alert, cooperative, in no acute distress   Head:    Normocephalic, without obvious abnormality, atraumatic   Eyes:             Lids and lashes normal, conjunctivae and sclerae normal, no   icterus, no pallor, corneas clear, PERRLA   Ears:    Ears appear intact with no abnormalities noted   Throat:   No oral lesions, no thrush, oral mucosa moist   Neck:   No adenopathy, supple, trachea midline, no thyromegaly, no     carotid bruit, no JVD   Back:     No kyphosis present, no scoliosis present, no skin lesions,       erythema or scars, no tenderness to percussion or                   palpation,   range of motion normal   Lungs:     Clear to auscultation,respirations regular, even and                   unlabored    Heart:    Regular rhythm and normal rate, normal S1 and S2, no            murmur, no gallop, no rub, no click   Breast Exam:    Deferred   Abdomen:     Normal bowel sounds, no masses, no organomegaly, soft        non-tender, non-distended, no guarding, no rebound                 tenderness   Genitalia:    Deferred   Extremities:   Moves all extremities well, no edema, no cyanosis, no              redness   Pulses:   Pulses palpable and equal bilaterally   Skin:   No bleeding, bruising or rash   Lymph nodes:   No palpable adenopathy   Neurologic:   Cranial nerves 2 - 12 grossly intact, sensation intact, DTR        present and equal bilaterally       Discharge Disposition  Home or Self Care    Discharge Medications     Discharge Medications      New Medications      Instructions Start Date   ibuprofen 800 MG tablet  Commonly known as: ADVIL,MOTRIN   800 mg, Oral, Every 8 Hours PRN      oxyCODONE-acetaminophen  MG per tablet  Commonly known as: PERCOCET   1 tablet, Oral, Every 4 Hours PRN         Continue These Medications      Instructions Start Date   famotidine 20 MG tablet  Commonly known as: PEPCID   TAKE 1 TABLET BY MOUTH TWICE DAILY AS NEEDED FOR HEARTBURN      ondansetron 4 MG tablet  Commonly known as: ZOFRAN   4 mg, Oral, Every 8 Hours PRN      Prenatal Multivitamin + DHA 28-0.8 & 200 MG misc   1 tablet, Oral, Daily       ProAir  (90 Base) MCG/ACT inhaler  Generic drug: albuterol sulfate HFA   2 puffs, Every 4 Hours PRN, for wheezing             Discharge Diet:     Activity at Discharge:     Follow-up Appointments  No future appointments.      Test Results Pending at Discharge       Jeffrey Cooper MD  01/16/22  11:28 EST    Time: .          Electronically signed by Jeffery Cooper MD at 01/16/22 7351

## 2022-01-20 NOTE — PAYOR COMM NOTE
"Kirill Black (31 y.o. Female)     [facility]    [address] [place] [address]  Facility NPI: [id number]    [facility]  Fax: [phone]  Phone: [phone] (Cinthia: 3420)    Subject: CLINICALS SUPPORTING INPT ADMISSION MATERNITY   Reference #: [id number]  Please don't hesitate to contact me with any questions or concerns.                        Date of Birth Social Security Number Address Home Phone MRN    [id number]  [id number] [address] [address] [phone] [id number]    Oriental orthodox Marital Status             None        Admission Date Admission Type Admitting Provider Attending Provider Department, Room/Bed    22 Elective Jeffery Cooper MD  [facility] 3 EAST, E364/1    Discharge Date Discharge Disposition Discharge Destination          2022 Home or Self Care              Attending Provider: (none)   Allergies: Lisinopril, Sulfamethoxazole-trimethoprim, Morphine    Isolation: None   Infection: None   Code Status: Prior   Advance Care Planning Activity    Ht: 154.9 cm (61\")   Wt: 71.7 kg (158 lb)    Admission Cmt: None   Principal Problem: S/P  section [Z98.891]                 Active Insurance as of 2022     Primary Coverage     Payor Plan Insurance Group Employer/Plan Group    [facility] COMMUNITY PLAN [facility] COMMUNITY PLAN [facility]     Payor Plan Address Payor Plan Phone Number Payor Plan Fax Number Effective Dates    [address]   2021 - None Entered    [facility] [id number]       Subscriber Name Subscriber Birth Date Member ID       KIRILL BLACK 1990 [id number]                 Emergency Contacts      (Rel.) Home Phone Work Phone Mobile Phone    CONTACT,NO (Other) -- -- [phone]               History & Physical      Jeffery Cooper MD at 22 [address] [place] [address]  Obstetric History and Physical    Chief Complaint   Patient presents with   • Scheduled      " 39 wk elective        Subjective     Patient is a 31 y.o. female  currently at 39w0d, who presents with 39-week gestation for scheduled repeat .  She has had 2 prior  sections.  She has had uneventful prenatal course and had normal NIPT.  She has noted normal fetal movement and no fluid leakage or bleeding prior to admission..    Her prenatal care is benign.  Her previous obstetric/gynecological history is noted for is remarkable for 2 prior  sections.    The following portions of the patients history were reviewed and updated as appropriate: current medications, allergies, past medical history, past surgical history, past family history, past social history and problem list .       Prenatal Information:  Prenatal Results     POC Urine Glucose/Protein     Test Value Reference Range Date Time    Urine Glucose ^ Negative  Negative, 1000 mg/dL (3+) 22     Urine Protein ^ Negative  Negative 22           Initial Prenatal Labs     Test Value Reference Range Date Time    Hemoglobin  12.4 g/dL 12.0 - 15.9 22 0646       13.3 g/dL 12.0 - 15.9 21 1026       15.6 g/dL 11.1 - 15.9 21 1527    Hematocrit  36.4 % 34.0 - 46.6 22 0646       39.6 % 34.0 - 46.6 21 1026       45.5 % 34.0 - 46.6 21 1527    Platelets  243 10*3/mm3 140 - 450 22 0646       336 x10E3/uL 150 - 450 21 1527    Rubella IgG  14.30 index Immune >0.99 21 1527    Hepatitis B SAg  Negative  Negative 21 1527    Hepatitis C Ab  <0.1 s/co ratio 0.0 - 0.9 21 1527    RPR  Non Reactive  Non Reactive 21 1527    ABO  AB   22 0646    Rh  Positive   22 0646    Antibody Screen  Negative   22 0646       Negative  Negative 21 1527    HIV  Non Reactive  Non Reactive 21 1527    Urine Culture  Final report   21 1011       Final report   21 1541    Gonorrhea  Negative  Negative 21 1542    Chlamydia  Negative  Negative  05/18/21 1542    TSH        HgB A1c               2nd and 3rd Trimester     Test Value Reference Range Date Time    Hemoglobin (repeated)  12.4 g/dL 12.0 - 15.9 01/12/22 0646       13.3 g/dL 12.0 - 15.9 09/30/21 1026    Hematocrit (repeated)  36.4 % 34.0 - 46.6 01/12/22 0646       39.6 % 34.0 - 46.6 09/30/21 1026    Platelets   243 10*3/mm3 140 - 450 01/12/22 0646       336 x10E3/uL 150 - 450 05/18/21 1527    GCT  91 mg/dL 65 - 139 09/30/21 1026    Antibody Screen (repeated)  Negative   01/12/22 0646    GTT Fasting        GTT 1 Hr        GTT 2 Hr        GTT 3 Hr        Group B Strep  Negative  Negative 12/23/21 1048          Drug Screening     Test Value Reference Range Date Time    Amphetamine Screen  Negative ng/mL Lnljxt=5912 05/18/21 1541    Barbiturate Screen  Negative ng/mL Lizbzn=309 05/18/21 1541    Benzodiazepine Screen  Negative ng/mL Tokngm=691 05/18/21 1541    Methadone Screen  Negative ng/mL Vcrrpm=645 05/18/21 1541    Phencyclidine Screen  Negative ng/mL Cutoff=25 05/18/21 1541    Opiates Screen  Negative ng/mL Nslkzb=465 05/18/21 1541    THC Screen  Negative ng/mL Cutoff=50 05/18/21 1541    Cocaine Screen  Negative ng/mL Dahapc=443 05/18/21 1541    Propoxyphene Screen  Negative ng/mL Dolsyy=299 05/18/21 1541    Buprenorphine Screen        Methamphetamine Screen        Oxycodone Screen        Tricyclic Antidepressants Screen              Other (Risk screening)     Test Value Reference Range Date Time    Varicella IgG        Parvovirus IgG        CMV IgG        Cystic Fibrosis        Hemoglobin electrophoresis        NIPT        MSAFP-4        AFP (for NTD only)              Legend    ^: Historical                      External Prenatal Results     Pregnancy Outside Results - Transcribed From Office Records - See Scanned Records For Details     Test Value Date Time    ABO  AB  01/12/22 0646    Rh  Positive  01/12/22 0646    Antibody Screen  Negative  01/12/22 0646       Negative  05/18/21 1527     Varicella IgG       Rubella  14.30 index 21 1527    Hgb  12.4 g/dL 22 0646       13.3 g/dL 21 1026       15.6 g/dL 21 1527    Hct  36.4 % 22 0646       39.6 % 21 1026       45.5 % 21 1527    Glucose Fasting GTT       Glucose Tolerance Test 1 hour       Glucose Tolerance Test 3 hour       Gonorrhea (discrete)  Negative  21 1542    Chlamydia (discrete)  Negative  21 1542    RPR  Non Reactive  21 1527    VDRL       Syphilis Antibody       HBsAg  Negative  21 1527    Herpes Simplex Virus PCR       Herpes Simplex VIrus Culture       HIV  Non Reactive  21 1527    Hep C RNA Quant PCR       Hep C Antibody  <0.1 s/co ratio 21 1527    AFP       Group B Strep  Negative  21 1048    GBS Susceptibility to Clindamycin       GBS Susceptibility to Erythromycin       Fetal Fibronectin       Genetic Testing, Maternal Blood             Drug Screening     Test Value Date Time    Urine Drug Screen       Amphetamine Screen  Negative ng/mL 21 1541    Barbiturate Screen  Negative ng/mL 21 1541    Benzodiazepine Screen  Negative ng/mL 21 1541    Methadone Screen  Negative ng/mL 21 1541    Phencyclidine Screen  Negative ng/mL 21 1541    Opiates Screen       THC Screen       Cocaine Screen       Propoxyphene Screen  Negative ng/mL 21 1541    Buprenorphine Screen       Methamphetamine Screen       Oxycodone Screen       Tricyclic Antidepressants Screen             Legend    ^: Historical                         Past OB History:     OB History    Para Term  AB Living   4 2 0 0 1 2   SAB IAB Ectopic Molar Multiple Live Births   1 0 0 0 0 2      # Outcome Date GA Lbr Lemuel/2nd Weight Sex Delivery Anes PTL Lv   4 Current            3 SAB 2019           2 Para 01/22/10   2608 g (5 lb 12 oz) M CS-Unspec   ROSENDO   1 Para 07   3204 g (7 lb 1 oz) F CS-Unspec   ROSENDO       Past Medical History: Past Medical History:    Diagnosis Date   • Asthma       Past Surgical History Past Surgical History:   Procedure Laterality Date   • BREAST AUGMENTATION     •  SECTION        Family History: Family History   Problem Relation Age of Onset   • Diabetes Father    • Hypertension Father       Social History:  reports that she has been smoking cigarettes. She has a 5.00 pack-year smoking history. She has never used smokeless tobacco.   reports previous alcohol use.   reports no history of drug use.        General ROS: Pertinent items are noted in HPI, all other systems reviewed and negative    Objective       Vital Signs Range for the last 24 hours  Temperature: Temp:  [98.7 °F (37.1 °C)] 98.7 °F (37.1 °C)   Temp Source: Temp src: Oral   BP: BP: (130-140)/(85-89) 130/89   Pulse: Heart Rate:  [] 91   Respirations: Resp:  [18] 18   SPO2: SpO2:  [99 %] 99 %   O2 Amount (l/min):     O2 Devices     Weight: Weight:  [71.7 kg (158 lb)-71.8 kg (158 lb 6.4 oz)] 71.7 kg (158 lb)     Physical Examination: General appearance - alert, well appearing, and in no distress and oriented to person, place, and time  Mental status - alert, oriented to person, place, and time  Neck - supple, no significant adenopathy  Chest - clear to auscultation, no wheezes, rales or rhonchi, symmetric air entry  Heart - normal rate, regular rhythm, normal S1, S2, no murmurs, rubs, clicks or gallops  Abdomen - soft, nontender, nondistended, no masses or organomegaly  Fundus consistent with 39-week gestation nontender uterus.  Neurological - alert, oriented, normal speech, no focal findings or movement disorder noted  Extremities - peripheral pulses normal, no pedal edema, no clubbing or cyanosis    Presentation:  Cephalic   Cervix: Exam by:     Dilation:     Effacement:     Station:         Fetal Heart Rate Assessment   Method: Fetal HR Assessment Method: external   Beats/min: Fetal HR (beats/min): 140   Baseline: Fetal Heart Baseline Rate: normal range    Variability: Fetal HR Variability: moderate (amplitude range 6 to 25 bpm)   Accels: Fetal HR Accelerations: greater than/equal to 15 bpm, lasting at least 15 seconds   Decels: Fetal HR Decelerations: absent   Tracing Category:       Uterine Assessment   Method: Method: external tocotransducer, per patient report   Frequency (min):     Ctx Count in 10 min:     Duration:     Intensity: Contraction Intensity: no contractions   Intensity by IUPC:     Resting Tone: Uterine Resting Tone: soft by palpation   Resting Tone by IUPC:     [place] Units:       Laboratory Results: Hemoglobin 12.4  Radiology Review: Fourth ultrasound at 31st percentile on 2021.  Other Studies: None    Assessment/Plan       Pregnancy        Assessment:  1.  Intrauterine pregnancy at 39w0d gestation with reactive, reassuring fetal status.    2. Not in labor  without ROM  3.  Obstetrical history significant for 2 prior  sections.  4.  GBS status:   Strep Gp B Culture   Date Value Ref Range Status   2021 Negative Negative Final     Comment:     Centers for Disease Control and Prevention (CDC) and American Congress  of Obstetricians and Gynecologists (ACOG) guidelines for prevention of   group B streptococcal (GBS) disease specify co-collection of  a vaginal and rectal swab specimen to maximize sensitivity of GBS  detection. Per the CDC and ACOG, swabbing both the lower vagina and  rectum substantially increases the yield of detection compared with  sampling the vagina alone.  Penicillin G, ampicillin, or cefazolin are indicated for intrapartum  prophylaxis of  GBS colonization. Reflex susceptibility  testing should be performed prior to use of clindamycin only on GBS  isolates from penicillin-allergic women who are considered a high risk  for anaphylaxis. Treatment with vancomycin without additional testing  is warranted if resistance to clindamycin is noted.         Plan:  1. Repeat  scheduled  2.  Plan of care has been reviewed with patient and she agrees  3.  Risks, benefits of treatment plan have been discussed.  4.  All questions have been answered.  5. she does not desire permanent sterilization at this time.      Jeffery Cooper MD  1/12/2022  08:52 EST    Electronically signed by Jeffery Cooper MD at 01/12/22 6400

## 2022-01-21 ENCOUNTER — TELEPHONE (OUTPATIENT)
Dept: OBSTETRICS AND GYNECOLOGY | Facility: CLINIC | Age: 32
End: 2022-01-21

## 2022-01-21 NOTE — TELEPHONE ENCOUNTER
Patient called and is requesting a refill on her percocet prescription to be sent to her [facility] pharmacy

## 2022-01-26 ENCOUNTER — OFFICE VISIT (OUTPATIENT)
Dept: OBSTETRICS AND GYNECOLOGY | Facility: CLINIC | Age: 32
End: 2022-01-26

## 2022-01-26 VITALS
DIASTOLIC BLOOD PRESSURE: 74 MMHG | SYSTOLIC BLOOD PRESSURE: 128 MMHG | BODY MASS INDEX: 26.77 KG/M2 | HEIGHT: 61 IN | WEIGHT: 141.8 LBS

## 2022-01-26 DIAGNOSIS — Z09 POSTOPERATIVE FOLLOW-UP: Primary | ICD-10-CM

## 2022-01-26 PROCEDURE — 99213 OFFICE O/P EST LOW 20 MIN: CPT | Performed by: OBSTETRICS & GYNECOLOGY

## 2022-01-26 RX ORDER — FLUOXETINE HYDROCHLORIDE 20 MG/1
20 CAPSULE ORAL DAILY
Qty: 30 CAPSULE | Refills: 6 | Status: SHIPPED | OUTPATIENT
Start: 2022-01-26 | End: 2023-02-22

## 2022-01-26 NOTE — PROGRESS NOTES
"Britney Christiansen is a 31 y.o. female who presents to the clinic 2 weeks status post Repeat  for Term pregnancy. Eating a regular diet without difficulty. Bowel movements are normal. Pain is controlled without any medications.  She is just taking some ibuprofen at this point.  She does states she feels that postpartum depression is creeping up on her.  She has been on antidepressants in the past and had postpartum depression years ago with her 2 prior deliveries.  She has done well on Prozac in the past and wishes to initiate this at this time.  She denies any harmful thoughts.    The following portions of the patient's history were reviewed and updated as appropriate: allergies, current medications, past family history, past medical history, past social history, past surgical history and problem list.    Review of Systems  Constitutional: negative for chills and fevers  Genitourinary:negative, Positive for mild continued vaginal bleeding      Objective     /74   Ht 154.9 cm (60.98\")   Wt 64.3 kg (141 lb 12.8 oz)   BMI 26.81 kg/m²   General:  alert, appears stated age and cooperative   Abdomen: soft, bowel sounds active, non-tender   Incision:   healing well, no drainage, no erythema, no hernia, no seroma, no swelling, no dehiscence, incision well approximated         Assessment      Doing well postoperatively.  Operative findings again reviewed. Pathology report discussed.   Postpartum depression.  Will initiate Prozac 20 mg.  Plan     1. Continue any current medications.  2. Wound care discussed.  3. Activity restrictions: She may increase activities as tolerated.  4. Anticipated return to work: 4 weeks.  5. Follow up: 4 weeks for final postpartum check and to assess how she is doing on the Prozac.     "

## 2022-02-07 RX ORDER — IBUPROFEN 800 MG/1
TABLET ORAL
Qty: 40 TABLET | Refills: 1 | Status: SHIPPED | OUTPATIENT
Start: 2022-02-07 | End: 2022-03-07

## 2022-02-21 RX ORDER — FAMOTIDINE 20 MG/1
TABLET, FILM COATED ORAL
Qty: 30 TABLET | Refills: 1 | Status: SHIPPED | OUTPATIENT
Start: 2022-02-21 | End: 2022-02-23

## 2022-02-23 ENCOUNTER — POSTPARTUM VISIT (OUTPATIENT)
Dept: OBSTETRICS AND GYNECOLOGY | Facility: CLINIC | Age: 32
End: 2022-02-23

## 2022-02-23 VITALS
HEIGHT: 61 IN | BODY MASS INDEX: 25.49 KG/M2 | DIASTOLIC BLOOD PRESSURE: 70 MMHG | SYSTOLIC BLOOD PRESSURE: 130 MMHG | WEIGHT: 135 LBS

## 2022-02-23 PROCEDURE — 0503F POSTPARTUM CARE VISIT: CPT | Performed by: OBSTETRICS & GYNECOLOGY

## 2022-02-23 RX ORDER — LEVONORGESTREL AND ETHINYL ESTRADIOL 0.15-0.03
1 KIT ORAL DAILY
Qty: 84 TABLET | Refills: 3 | Status: SHIPPED | OUTPATIENT
Start: 2022-02-23 | End: 2022-03-25

## 2022-02-23 NOTE — PROGRESS NOTES
"Britney Christiansen is a 31 y.o. female who presents for a postpartum visit. She is 6 weeks postpartum following a repeat low transverse  section. I have fully reviewed the prenatal and intrapartum course. The delivery was at 39 gestational weeks. Outcome: repeat  section, low transverse incision. Anesthesia: spinal. Postpartum course has been remarkable for development of postpartum depression.. Baby's course has been uneventful. Baby is feeding by bottle - .. Bleeding Is consistent with her first menses since delivery. Bowel function is normal. Bladder function is normal. Patient is sexually active. Contraception method is OCP (estrogen/progesterone). Postpartum depression screening: positive.     She started on Prozac about 4 weeks ago.  She states that she is doing quite well on this and having no negative side effects and wishes to continue on this medication.     The following portions of the patient's history were reviewed and updated as appropriate: allergies, current medications, past family history, past medical history, past social history, past surgical history and problem list.    Review of Systems  Constitutional: negative for chills and fevers    Objective   /70   Ht 154.9 cm (60.98\")   Wt 61.2 kg (135 lb)   LMP 2022   BMI 25.52 kg/m²    General:  alert, appears stated age and cooperative    Breasts:  negative   Lungs: .   Heart:  regular rate and rhythm, S1, S2 normal, no murmur, click, rub or gallop and .   Abdomen: soft, non-tender; bowel sounds normal; no masses,  no organomegaly and Incision healing very well with no separation and no defect.    Vulva:  not evaluated   Vagina: not evaluated   Cervix:  Not evaluated   Corpus: not examined   Adnexa:  not evaluated   Rectal Exam: Not performed.     Assessment/Plan   6 weeks postpartum exam. Pap smear not done at today's visit.  Postpartum depression  1. Contraception: OCP (estrogen/progesterone)  2.  She " wishes to resume [place].  3. Follow up in: 3 months or as needed.  4.  We will continue the Prozac 20 mg.

## 2022-03-07 RX ORDER — IBUPROFEN 800 MG/1
TABLET ORAL
Qty: 40 TABLET | Refills: 1 | Status: SHIPPED | OUTPATIENT
Start: 2022-03-07 | End: 2023-02-22 | Stop reason: SDUPTHER

## 2022-03-21 RX ORDER — FAMOTIDINE 20 MG/1
TABLET, FILM COATED ORAL
Qty: 30 TABLET | Refills: 1 | Status: SHIPPED | OUTPATIENT
Start: 2022-03-21 | End: 2023-02-22

## 2023-02-22 ENCOUNTER — OFFICE VISIT (OUTPATIENT)
Dept: FAMILY MEDICINE CLINIC | Facility: CLINIC | Age: 33
End: 2023-02-22
Payer: MEDICAID

## 2023-02-22 VITALS
SYSTOLIC BLOOD PRESSURE: 146 MMHG | WEIGHT: 150.2 LBS | OXYGEN SATURATION: 97 % | DIASTOLIC BLOOD PRESSURE: 96 MMHG | HEART RATE: 97 BPM | HEIGHT: 61 IN | TEMPERATURE: 98 F | BODY MASS INDEX: 28.36 KG/M2

## 2023-02-22 DIAGNOSIS — J45.30 MILD PERSISTENT ASTHMA, UNSPECIFIED WHETHER COMPLICATED: ICD-10-CM

## 2023-02-22 DIAGNOSIS — M26.621 ARTHRALGIA OF RIGHT TEMPOROMANDIBULAR JOINT: ICD-10-CM

## 2023-02-22 DIAGNOSIS — I10 PRIMARY HYPERTENSION: Primary | ICD-10-CM

## 2023-02-22 DIAGNOSIS — F17.200 CURRENT SMOKER: ICD-10-CM

## 2023-02-22 DIAGNOSIS — H65.21 RIGHT CHRONIC SEROUS OTITIS MEDIA: ICD-10-CM

## 2023-02-22 PROCEDURE — 99213 OFFICE O/P EST LOW 20 MIN: CPT | Performed by: NURSE PRACTITIONER

## 2023-02-22 RX ORDER — AMOXICILLIN 875 MG/1
875 TABLET, COATED ORAL 2 TIMES DAILY
Qty: 14 TABLET | Refills: 0 | Status: SHIPPED | OUTPATIENT
Start: 2023-02-22 | End: 2023-03-01

## 2023-02-22 RX ORDER — NICOTINE 21 MG/24HR
1 PATCH, TRANSDERMAL 24 HOURS TRANSDERMAL EVERY 24 HOURS
Qty: 28 PATCH | Refills: 1 | Status: SHIPPED | OUTPATIENT
Start: 2023-02-22

## 2023-02-22 RX ORDER — LOSARTAN POTASSIUM 50 MG/1
50 TABLET ORAL DAILY
Qty: 30 TABLET | Refills: 1 | Status: SHIPPED | OUTPATIENT
Start: 2023-02-22 | End: 2023-03-27

## 2023-02-22 RX ORDER — BUDESONIDE AND FORMOTEROL FUMARATE DIHYDRATE 80; 4.5 UG/1; UG/1
2 AEROSOL RESPIRATORY (INHALATION)
Qty: 10.2 G | Refills: 1 | Status: SHIPPED | OUTPATIENT
Start: 2023-02-22 | End: 2023-03-27

## 2023-02-22 RX ORDER — IBUPROFEN 800 MG/1
800 TABLET ORAL EVERY 8 HOURS PRN
Qty: 40 TABLET | Refills: 1 | Status: SHIPPED | OUTPATIENT
Start: 2023-02-22

## 2023-02-22 NOTE — PATIENT INSTRUCTIONS
Start Losartan daily.  Set date to stop smoking and start nicotine patch.  Do not smoke while using nicotine patch.  Continue to monitor your blood pressure periodically and record results.  Call if your blood pressure is consistently greater than 140/90.  Take Ibuprofen with food.  Continue to work on healthy diet and exercise.  Follow up pending lab results.  Follow up in 1 month for physical with fasting labs, or sooner if symptoms persist or worsen.

## 2023-02-22 NOTE — PROGRESS NOTES
Subjective   Isabella Christiansen is a 32 y.o. female.     Chief Complaint   Patient presents with   • Earache     Right ear x 2 days    • Hypertension     Elevated x 1 month        History of Present Illness   New patient, here to establish care; previous PCP Dr. Cisneros, has not seen for several years.    Also, c/o elevated BP x1 month; has noted BP 180s/110s at times; BP improves outside of home; has a lot of stress at home caring for mother with dementia;r angelita moved in with mother to care for her and has been a lot; has been adjustment for whole family; frustrating caring for person with dementia; no headaches; when BP spikes will feel like going to pass out; mild swelling in feet/ankles at times; has noted in evenings on occasion; is on feet much of the day; symptoms resolved next morning; only able to eat about once per day; no added salt in diet; stays active; has 1 year old that keeps her busy in addition to caring for mother; currently smoking about 1 pack every 2 days, has been trying to quit; had stopped smoking and started again when going through a divorce in 2018; has tried Wellbutrin in past and did not help stop smoking; ready to stop smoking; has been on medication for BP in 2013, was able to go off medication with weight loss and going to gym.    Also, c/o right ear pain x2 days; has been taking Ibuprofen 800 mg and has not helped; has had discomfort for couple of months since had COVID-19 virus in December; has throbbing in right ear; no fever; some nasal congestion more than usual; no sore throat; no nausea, vomiting, or diarrhea.    Uses ProAir as needed for asthma and helps; typically uses ProAir about twice per week; has been on Symbicort in past and helped.    LMP last week.      The following portions of the patient's history were reviewed and updated as appropriate: allergies, current medications, past family history, past medical history, past social history, past surgical history and  problem list.    Current Outpatient Medications on File Prior to Visit   Medication Sig   • ProAir  (90 Base) MCG/ACT inhaler 2 puffs Every 4 (Four) Hours As Needed. for wheezing     No current facility-administered medications on file prior to visit.        Past Medical History:   Diagnosis Date   • Allergy to ACE inhibitors 2016   • Allergy to morphine 2016   • Allergy to NSAIDs 2016   • Asthma        Past Surgical History:   Procedure Laterality Date   • BREAST AUGMENTATION     •  SECTION     •  SECTION N/A 2022    Procedure:  SECTION REPEAT;  Surgeon: Jeffery Cooper MD;  Location: [facility] LABOR DELIVERY;  Service: Obstetrics/Gynecology;  Laterality: N/A;   •  SECTION         Family History   Problem Relation Age of Onset   • Hypertension Mother    • Dementia Mother    • Stroke Mother 65   • Diabetes Father    • Hypertension Father        Social History     Socioeconomic History   • Marital status:    Tobacco Use   • Smoking status: Every Day     Packs/day: 0.50     Years: 5.00     Pack years: 2.50     Types: Cigarettes   • Smokeless tobacco: Never   • Tobacco comments:     Previously smoking about 1 pack per day for about 5 years, currently smoking about half pack daily   Vaping Use   • Vaping Use: Never used   Substance and Sexual Activity   • Alcohol use: Not Currently   • Drug use: Never   • Sexual activity: Yes     Partners: Male     Birth control/protection: None       Review of Systems   Constitutional: Negative for appetite change, fever, unexpected weight gain and unexpected weight loss. Chills: some at times with increased pain in right ear. Fatigue: some at times, no unexplained fatigue.   HENT: Negative for trouble swallowing. Ear pain: see HPI. Postnasal drip: some last couple of days.    Eyes: Negative for blurred vision and discharge.   Respiratory: Negative for cough, chest tightness and shortness of breath.   "  Cardiovascular: Negative for chest pain and palpitations (some with spikes in BP).   Gastrointestinal: Negative for abdominal pain, blood in stool, constipation, diarrhea, GERD and indigestion.   Genitourinary: Negative for dysuria and frequency.   Musculoskeletal: Negative for arthralgias. Back pain: some in back at times since had spinal block with .   Skin: Negative for rash.   Neurological: Negative for syncope and weakness. Light-headedness: see HPI.   Hematological: Does not bruise/bleed easily.   Psychiatric/Behavioral: Negative for suicidal ideas.       PHQ-2 Depression Screening  Little interest or pleasure in doing things? 0-->not at all   Feeling down, depressed, or hopeless? 0-->not at all   PHQ-2 Total Score 0     BRITTANY-7 anxiety score: 3      Objective   Vitals:    23 1102   BP: 146/96   BP Location: Left arm   Patient Position: Sitting   Cuff Size: Adult   Pulse: 97   Temp: 98 °F (36.7 °C)   SpO2: 97%   Weight: 68.1 kg (150 lb 3.2 oz)   Height: 154.9 cm (60.98\")     Body mass index is 28.4 kg/m².    Physical Exam  Vitals and nursing note reviewed.   Constitutional:       General: She is not in acute distress.     Appearance: She is well-developed and well-groomed. She is not diaphoretic.   HENT:      Head: Normocephalic.      Jaw: No pain on movement. Jaw tenderness: with palpation of right TMJ.      Right Ear: External ear normal. Right ear decreased hearing: decreased compared to left. A middle ear effusion is present. Tympanic membrane is not erythematous. Right ear retracted TM: mild.      Left Ear: External ear normal. Left ear decreased hearing: mild. A middle ear effusion is present. Tympanic membrane is not erythematous. Left ear injected TM: mild.      Nose: Nose normal.      Right Sinus: No maxillary sinus tenderness or frontal sinus tenderness.      Left Sinus: No maxillary sinus tenderness or frontal sinus tenderness.      Mouth/Throat:      Mouth: Mucous membranes are " moist.      Pharynx: No oropharyngeal exudate. Posterior oropharyngeal erythema: mild in posterior pharynx.   Eyes:      Conjunctiva/sclera: Conjunctivae normal.   Neck:      Vascular: No carotid bruit.   Cardiovascular:      Rate and Rhythm: Normal rate and regular rhythm.      Pulses: Normal pulses.      Heart sounds: Normal heart sounds. No murmur heard.  Pulmonary:      Effort: Pulmonary effort is normal. No respiratory distress.      Breath sounds: Normal breath sounds.   Abdominal:      General: Bowel sounds are normal.      Palpations: Abdomen is soft. There is no hepatomegaly or splenomegaly.      Tenderness: There is no abdominal tenderness. There is no guarding.   Musculoskeletal:      Cervical back: Normal range of motion and neck supple.      Right lower leg: No edema.      Left lower leg: No edema.   Lymphadenopathy:      Cervical: No cervical adenopathy.   Skin:     General: Skin is warm and dry.      Findings: No rash.   Neurological:      Mental Status: She is alert and oriented to person, place, and time.      Gait: Gait is intact.   Psychiatric:         Mood and Affect: Mood normal.         Behavior: Behavior normal.         Thought Content: Thought content normal.         Cognition and Memory: Cognition normal.         Judgment: Judgment normal.         Lab Results   Component Value Date    WBC 10.12 01/13/2022    RBC 3.59 (L) 01/13/2022    HGB 10.7 (L) 01/13/2022    HCT 32.7 (L) 01/13/2022    MCV 91.1 01/13/2022    MCH 29.8 01/13/2022    MCHC 32.7 01/13/2022    RDW 12.2 (L) 01/13/2022    RDWSD 40.7 01/13/2022    MPV 10.5 01/13/2022     01/13/2022    NEUTRORELPCT 61.1 01/13/2022    LYMPHORELPCT 25.6 01/13/2022    MONORELPCT 7.8 01/13/2022    EOSRELPCT 3.5 01/13/2022    BASORELPCT 0.4 01/13/2022    AUTOIGPER 1.6 (H) 01/13/2022    NEUTROABS 6.19 01/13/2022    LYMPHSABS 2.59 01/13/2022    MONOSABS 0.79 01/13/2022    EOSABS 0.35 01/13/2022    BASOSABS 0.04 01/13/2022    AUTOIGNUM 0.16 (H)  01/13/2022    NRBC 0.0 01/13/2022 [place] [facility] [place] seen with c/o back pain; was given Rx for Baclofen and Prednisone    Assessment    Problem List Items Addressed This Visit     Primary hypertension - Primary    Current Assessment & Plan     Hypertension is newly identified.  Regular aerobic exercise.  Stop smoking.  Continue current medications.  Ambulatory blood pressure monitoring.  Blood pressure will be reassessed in 4 weeks.  Start Losartan daily.  Call if your blood pressure is consistently greater than 140/90.         Relevant Medications    losartan (Cozaar) 50 MG tablet    Other Relevant Orders    Comprehensive Metabolic Panel    CBC & Differential    Current smoker    Current Assessment & Plan     Set date to stop smoking and start nicotine patch.  Do not smoke while using nicotine patch.         Relevant Medications    nicotine (NICODERM CQ) 21 MG/24HR patch    Mild persistent asthma    Current Assessment & Plan     Resume Symbicort twice daily.  Continue Albuterol inhaler as needed.         Relevant Medications    budesonide-formoterol (Symbicort) 80-4.5 MCG/ACT inhaler    Right chronic serous otitis media    Relevant Medications    amoxicillin (AMOXIL) 875 MG tablet    Arthralgia of right temporomandibular joint    Current Assessment & Plan     Try Ibuprofen.  Take Ibuprofen with food.         Relevant Medications    ibuprofen (ADVIL,MOTRIN) 800 MG tablet        Return in about 1 month (around 3/22/2023) for Annual physical, Recheck.or sooner if symptoms persist or worsen.  Discussed right ear pain may be referred from TMJ; will try antibiotic since patient has had discomfort in ears since had COVID-19 virus in December; pt will schedule an appointment with dentist if symptoms persist after antibiotic.      Isabella Christiansen  reports that she has been smoking cigarettes. She has a 2.50 pack-year smoking history. She has never used smokeless tobacco.. I have educated her on the  risk of diseases from using tobacco products such as cancer, COPD and heart disease.     I advised her to quit and she is willing to quit. We have discussed the following method/s for tobacco cessation:  Prescription Medicaiton.  Together we have set a quit date for 1 week from today.  She will follow up with me in 4 weeks or sooner to check on her progress.    I spent 3.5 minutes counseling the patient.            COVID-19 Precautions - Patient was compliant in wearing a mask. When I saw the patient, I used appropriate personal protective equipment (PPE) including mask, gloves, and eye shield (standard procedure).  Hand hygiene was completed before and after seeing the patient.

## 2023-02-23 ENCOUNTER — PATIENT ROUNDING (BHMG ONLY) (OUTPATIENT)
Dept: FAMILY MEDICINE CLINIC | Facility: CLINIC | Age: 33
End: 2023-02-23
Payer: MEDICAID

## 2023-02-23 PROBLEM — Z98.891 S/P CESAREAN SECTION: Status: RESOLVED | Noted: 2022-01-13 | Resolved: 2023-02-23

## 2023-02-23 PROBLEM — Z34.90 PREGNANCY: Status: RESOLVED | Noted: 2022-01-12 | Resolved: 2023-02-23

## 2023-02-23 PROBLEM — H65.21 RIGHT CHRONIC SEROUS OTITIS MEDIA: Status: ACTIVE | Noted: 2023-02-23

## 2023-02-23 PROBLEM — J45.30 MILD PERSISTENT ASTHMA: Status: ACTIVE | Noted: 2023-02-23

## 2023-02-23 PROBLEM — F17.200 CURRENT SMOKER: Status: ACTIVE | Noted: 2023-02-23

## 2023-02-23 PROBLEM — M26.621 ARTHRALGIA OF RIGHT TEMPOROMANDIBULAR JOINT: Status: ACTIVE | Noted: 2023-02-23

## 2023-02-23 PROBLEM — I10 PRIMARY HYPERTENSION: Status: ACTIVE | Noted: 2023-02-23

## 2023-02-23 LAB
ALBUMIN SERPL-MCNC: 4 G/DL (ref 3.8–4.8)
ALBUMIN/GLOB SERPL: 1.6 {RATIO} (ref 1.2–2.2)
ALP SERPL-CCNC: 93 IU/L (ref 44–121)
ALT SERPL-CCNC: 10 IU/L (ref 0–32)
AST SERPL-CCNC: 18 IU/L (ref 0–40)
BASOPHILS # BLD AUTO: 0 X10E3/UL (ref 0–0.2)
BASOPHILS NFR BLD AUTO: 1 %
BILIRUB SERPL-MCNC: <0.2 MG/DL (ref 0–1.2)
BUN SERPL-MCNC: 5 MG/DL (ref 6–20)
BUN/CREAT SERPL: 8 (ref 9–23)
CALCIUM SERPL-MCNC: 8.8 MG/DL (ref 8.7–10.2)
CHLORIDE SERPL-SCNC: 104 MMOL/L (ref 96–106)
CO2 SERPL-SCNC: 22 MMOL/L (ref 20–29)
CREAT SERPL-MCNC: 0.65 MG/DL (ref 0.57–1)
EGFRCR SERPLBLD CKD-EPI 2021: 118 ML/MIN/1.73
EOSINOPHIL # BLD AUTO: 0.3 X10E3/UL (ref 0–0.4)
EOSINOPHIL NFR BLD AUTO: 5 %
ERYTHROCYTE [DISTWIDTH] IN BLOOD BY AUTOMATED COUNT: 12.2 % (ref 11.7–15.4)
GLOBULIN SER CALC-MCNC: 2.5 G/DL (ref 1.5–4.5)
GLUCOSE SERPL-MCNC: 72 MG/DL (ref 70–99)
HCT VFR BLD AUTO: 46.1 % (ref 34–46.6)
HGB BLD-MCNC: 15.1 G/DL (ref 11.1–15.9)
IMM GRANULOCYTES # BLD AUTO: 0.1 X10E3/UL (ref 0–0.1)
IMM GRANULOCYTES NFR BLD AUTO: 1 %
LYMPHOCYTES # BLD AUTO: 2.2 X10E3/UL (ref 0.7–3.1)
LYMPHOCYTES NFR BLD AUTO: 36 %
MCH RBC QN AUTO: 30.1 PG (ref 26.6–33)
MCHC RBC AUTO-ENTMCNC: 32.8 G/DL (ref 31.5–35.7)
MCV RBC AUTO: 92 FL (ref 79–97)
MONOCYTES # BLD AUTO: 0.5 X10E3/UL (ref 0.1–0.9)
MONOCYTES NFR BLD AUTO: 8 %
NEUTROPHILS # BLD AUTO: 3 X10E3/UL (ref 1.4–7)
NEUTROPHILS NFR BLD AUTO: 49 %
PLATELET # BLD AUTO: 289 X10E3/UL (ref 150–450)
POTASSIUM SERPL-SCNC: 4.7 MMOL/L (ref 3.5–5.2)
PROT SERPL-MCNC: 6.5 G/DL (ref 6–8.5)
RBC # BLD AUTO: 5.01 X10E6/UL (ref 3.77–5.28)
SODIUM SERPL-SCNC: 139 MMOL/L (ref 134–144)
WBC # BLD AUTO: 6.1 X10E3/UL (ref 3.4–10.8)

## 2023-02-23 NOTE — PROGRESS NOTES
• A My-Chart message has been sent to the patient for PATIENT ROUNDING with [facility] [place]

## 2023-02-23 NOTE — ASSESSMENT & PLAN NOTE
Hypertension is newly identified.  Regular aerobic exercise.  Stop smoking.  Continue current medications.  Ambulatory blood pressure monitoring.  Blood pressure will be reassessed in 4 weeks.  Start Losartan daily.  Call if your blood pressure is consistently greater than 140/90.

## 2023-03-27 DIAGNOSIS — I10 PRIMARY HYPERTENSION: ICD-10-CM

## 2023-03-27 DIAGNOSIS — J45.30 MILD PERSISTENT ASTHMA, UNSPECIFIED WHETHER COMPLICATED: ICD-10-CM

## 2023-03-27 RX ORDER — LOSARTAN POTASSIUM 50 MG/1
50 TABLET ORAL DAILY
Qty: 30 TABLET | Refills: 0 | Status: SHIPPED | OUTPATIENT
Start: 2023-03-27

## 2023-03-27 RX ORDER — DILTIAZEM HYDROCHLORIDE 60 MG/1
TABLET, FILM COATED ORAL
Qty: 10.2 G | Refills: 0 | Status: SHIPPED | OUTPATIENT
Start: 2023-03-27

## 2023-05-15 ENCOUNTER — OFFICE VISIT (OUTPATIENT)
Dept: FAMILY MEDICINE CLINIC | Facility: CLINIC | Age: 33
End: 2023-05-15
Payer: MEDICAID

## 2023-05-15 VITALS
DIASTOLIC BLOOD PRESSURE: 78 MMHG | HEART RATE: 78 BPM | BODY MASS INDEX: 30.21 KG/M2 | WEIGHT: 160 LBS | TEMPERATURE: 96.3 F | OXYGEN SATURATION: 98 % | SYSTOLIC BLOOD PRESSURE: 132 MMHG | HEIGHT: 61 IN

## 2023-05-15 DIAGNOSIS — R53.83 FATIGUE, UNSPECIFIED TYPE: ICD-10-CM

## 2023-05-15 DIAGNOSIS — E66.9 CLASS 1 OBESITY WITHOUT SERIOUS COMORBIDITY WITH BODY MASS INDEX (BMI) OF 30.0 TO 30.9 IN ADULT, UNSPECIFIED OBESITY TYPE: ICD-10-CM

## 2023-05-15 DIAGNOSIS — J45.30 MILD PERSISTENT ASTHMA WITHOUT COMPLICATION: ICD-10-CM

## 2023-05-15 DIAGNOSIS — I10 PRIMARY HYPERTENSION: ICD-10-CM

## 2023-05-15 DIAGNOSIS — Z00.00 ENCOUNTER FOR ANNUAL PHYSICAL EXAM: Primary | ICD-10-CM

## 2023-05-15 DIAGNOSIS — F17.200 CURRENT SMOKER: ICD-10-CM

## 2023-05-15 RX ORDER — HYDROCHLOROTHIAZIDE 12.5 MG/1
12.5 TABLET ORAL DAILY
Qty: 30 TABLET | Refills: 0 | Status: SHIPPED | OUTPATIENT
Start: 2023-05-15

## 2023-05-15 NOTE — PATIENT INSTRUCTIONS
Try over the counter Nexium daily for 2 weeks and see if helps.  Stop Losartan and try Hydrochlorothiazide daily.  Continue to monitor your blood pressure periodically and record results.  Continue to work on healthy diet and exercise.  Follow up pending lab results.  Follow up in 1 month, or sooner if problems or concerns.

## 2023-05-15 NOTE — PROGRESS NOTES
Britney Christiansen is a 33 y.o. female.     Chief Complaint   Patient presents with   • Annual Exam     Concern with water weight        History of Present Illness   Patient presents for CPE with fasting labs; pretty healthy diet, avoids fried foods and fast food; no formal exercise, stays active caring for toddler and mother; regular dental visits; last eye exam about 2 years ago, wears glasses as needed; no problems hearing; immunizations: will check date of last Tdap, declines COVID-19 vaccin or PCV20; sees Dr. Cooper for female care; last PAP 2021, plans to schedule; mammo never performed; no family history of breast cancer; colonoscopy never performed; no family history of colon cancer.    F/U HTN: changed to Losartan daily; has felt like BP has been over treated at times; has not taken Losartan last couple of days and BP has been normal; took Losartan on 5/13/23 and felt lightheaded; monitors BP, typically runs 130s/80-90s; no headaches; has had some swelling in ankles and feet for last few weeks; feels like has been retaining water; some swelling in bilateral hands as well; has been trying to eat healthier; no added salt in diet.    F/U smoking: tried nicotine patch and did not like how felt on patch; feels like smoking is more a habit; has been cutting back on number of cigarettes smoked daily.    Uses Symbicort at night and helps; has not needed to use Albuterol inhaler; Symbicort seems to work better; has not seen allergist for long time; no recent PFTs.      The following portions of the patient's history were reviewed and updated as appropriate: allergies, current medications, past family history, past medical history, past social history, past surgical history and problem list.    Current Outpatient Medications on File Prior to Visit   Medication Sig   • ProAir  (90 Base) MCG/ACT inhaler 2 puffs Every 4 (Four) Hours As Needed. for wheezing (Patient not taking: Reported on 5/15/2023)      No current facility-administered medications on file prior to visit.        Past Medical History:   Diagnosis Date   • Allergy to ACE inhibitors 2016   • Allergy to morphine 2016   • Allergy to NSAIDs 2016   • Asthma    • Hypertension 2023       Past Surgical History:   Procedure Laterality Date   • BREAST AUGMENTATION     •  SECTION     •  SECTION N/A 2022    Procedure:  SECTION REPEAT;  Surgeon: Jeffery Cooper MD;  Location: [facility] LABOR DELIVERY;  Service: Obstetrics/Gynecology;  Laterality: N/A;   •  SECTION     • COSMETIC SURGERY  2006       Family History   Problem Relation Age of Onset   • Hypertension Mother    • Dementia Mother    • Stroke Mother    • Cancer Mother         Lung   • Heart disease Mother    • Hyperlipidemia Mother    • Diabetes Father    • Hypertension Father    • Hyperlipidemia Father    • Stroke Father    • Hyperlipidemia Sister        Social History     Socioeconomic History   • Marital status:    Tobacco Use   • Smoking status: Some Days     Packs/day: 0.50     Years: 4.00     Pack years: 2.00     Types: Cigarettes   • Smokeless tobacco: Never   • Tobacco comments:     Previously smoking about 1 pack per day for about 5 years, currently smoking less than half pack daily   Vaping Use   • Vaping Use: Never used   Substance and Sexual Activity   • Alcohol use: Not Currently   • Drug use: Never   • Sexual activity: Yes     Partners: Male     Birth control/protection: None       Review of Systems   Constitutional: Positive for fatigue. Negative for appetite change, chills, fever, unexpected weight gain and unexpected weight loss.   HENT: Negative for ear pain, sinus pressure, sore throat and trouble swallowing (has sensation of air bubble in throat at times; no pain; has tried Pepcid and has not been helping).    Eyes: Negative for blurred vision and discharge.   Respiratory: Negative for cough, chest tightness  "and shortness of breath. Apnea: no snoring.    Cardiovascular: Negative for chest pain and palpitations.   Gastrointestinal: Negative for abdominal pain, blood in stool, constipation, diarrhea and GERD. Indigestion: eats slowly; has cut back on sodas.   Endocrine: Negative for cold intolerance and polydipsia.   Genitourinary: Negative for dysuria and frequency.   Musculoskeletal: Negative for arthralgias and back pain.   Skin: Negative for rash and skin lesions.   Neurological: Negative for syncope and weakness.   Hematological: Does not bruise/bleed easily.   Psychiatric/Behavioral: Negative for sleep disturbance (does not have consistent sleep schedule; has trouble falling asleep at times) and depressed mood. The patient is not nervous/anxious.        Objective   Vitals:    05/15/23 0939   BP: 132/78   BP Location: Left arm   Patient Position: Sitting   Cuff Size: Adult   Pulse: 78   Temp: 96.3 °F (35.7 °C)   TempSrc: Temporal   SpO2: 98%   Weight: 72.6 kg (160 lb)   Height: 154.9 cm (61\")     Body mass index is 30.23 kg/m².    Physical Exam  Vitals and nursing note reviewed.   Constitutional:       General: She is not in acute distress.     Appearance: She is well-developed and well-groomed. She is not diaphoretic.   HENT:      Head: Normocephalic and atraumatic.      Jaw: No tenderness or pain on movement.      Right Ear: External ear normal. No decreased hearing noted. Right ear middle ear effusion: mild. Tympanic membrane is not erythematous.      Left Ear: Tympanic membrane and external ear normal. No decreased hearing noted.      Nose: Nose normal.      Right Sinus: No maxillary sinus tenderness or frontal sinus tenderness.      Left Sinus: No maxillary sinus tenderness or frontal sinus tenderness.      Mouth/Throat:      Mouth: Mucous membranes are moist.      Pharynx: No oropharyngeal exudate (mild drainage in posterior pharynx) or posterior oropharyngeal erythema.   Eyes:      Extraocular Movements: " Extraocular movements intact.      Conjunctiva/sclera: Conjunctivae normal.      Pupils: Pupils are equal, round, and reactive to light.   Neck:      Thyroid: No thyromegaly.      Vascular: No carotid bruit.      Trachea: No tracheal deviation.   Cardiovascular:      Rate and Rhythm: Normal rate and regular rhythm.      Pulses: Normal pulses.      Heart sounds: Normal heart sounds. No murmur heard.  Pulmonary:      Effort: Pulmonary effort is normal. No respiratory distress.      Breath sounds: Normal breath sounds.   Abdominal:      General: Bowel sounds are normal.      Palpations: Abdomen is soft. There is no hepatomegaly or splenomegaly.      Tenderness: There is no abdominal tenderness. There is no guarding.   Musculoskeletal:         General: Normal range of motion.      Cervical back: Normal range of motion and neck supple. No bony tenderness.      Thoracic back: No bony tenderness.      Lumbar back: No bony tenderness.      Right lower leg: No edema.      Left lower leg: No edema.   Lymphadenopathy:      Cervical: No cervical adenopathy.   Skin:     General: Skin is warm and dry.      Findings: No rash.   Neurological:      Mental Status: She is alert and oriented to person, place, and time.      Cranial Nerves: No cranial nerve deficit.      Motor: Motor function is intact.      Coordination: Coordination normal.      Gait: Gait normal.      Deep Tendon Reflexes: Reflexes are normal and symmetric.   Psychiatric:         Mood and Affect: Mood normal.         Behavior: Behavior normal.         Thought Content: Thought content normal.         Cognition and Memory: Cognition normal.         Judgment: Judgment normal.       2/22/23 CMP WNL    Lab Results   Component Value Date    WBC 6.1 02/22/2023    RBC 5.01 02/22/2023    HGB 15.1 02/22/2023    HCT 46.1 02/22/2023    MCV 92 02/22/2023    MCH 30.1 02/22/2023    MCHC 32.8 02/22/2023    RDW 12.2 02/22/2023    RDWSD 40.7 01/13/2022    MPV 10.5 01/13/2022    PLT  289 02/22/2023    NEUTRORELPCT 49 02/22/2023    LYMPHORELPCT 36 02/22/2023    MONORELPCT 8 02/22/2023    EOSRELPCT 5 02/22/2023    BASORELPCT 1 02/22/2023    AUTOIGPER 1.6 (H) 01/13/2022    NEUTROABS 3.0 02/22/2023    LYMPHSABS 2.2 02/22/2023    MONOSABS 0.5 02/22/2023    EOSABS 0.3 02/22/2023    BASOSABS 0.0 02/22/2023    AUTOIGNUM 0.16 (H) 01/13/2022    NRBC 0.0 01/13/2022         Assessment    Problem List Items Addressed This Visit     Primary hypertension    Current Assessment & Plan     Hypertension is possibly over treated.  Regular aerobic exercise.  Medication changes per orders.  Ambulatory blood pressure monitoring.  Blood pressure will be reassessed in 4 weeks.  Stop Losartan and try Hydrochlorothiazide daily.         Relevant Medications    hydroCHLOROthiazide (HYDRODIURIL) 12.5 MG tablet    Other Relevant Orders    Lipid Panel With LDL / HDL Ratio (Completed)    Current smoker    Current Assessment & Plan     Continue to decrease number of cigarettes smoked daily.         Mild persistent asthma    Current Assessment & Plan     Continue Symbicort twice daily.  Continue Albuterol inhaler if needed.         Fatigue    Relevant Orders    Vitamin B12 & Folate (Completed)    TSH Rfx On Abnormal To Free T4 (Completed)    Comprehensive Metabolic Panel (Completed)    Class 1 obesity without serious comorbidity with body mass index (BMI) of 30.0 to 30.9 in adult    Current Assessment & Plan     Patient's (Body mass index is 30.23 kg/m².) indicates that they are obese (BMI >30) with health conditions that include hypertension . Weight is worsening. BMI  is above average; BMI management plan is completed. We discussed low calorie, low carb based diet program, portion control and increasing exercise.         Other Visit Diagnoses     Encounter for annual physical exam    -  Primary    Relevant Orders    Vitamin B12 & Folate (Completed)    TSH Rfx On Abnormal To Free T4 (Completed)    Comprehensive Metabolic Panel  (Completed)    Lipid Panel With LDL / HDL Ratio (Completed)           Return in about 1 month (around 6/15/2023) for Recheck.or sooner if symptoms persist or worsen.  Impression: Health maintenance visit.  Currently, eats a pretty healthy diet and has a fair exercise routine.  Cervical cancer screening: UTD per GYN.  Breast cancer screening: not indicated.  Colorectal cancer screening: not indicated.  Screening lab work includes: CMP, lipid.  Immunizations: will check records regarding date of last Tdap at [facility], declines COVID-19 vaccine or PCV20; risks and benefits of immunizations were discussed with patient.  Patient was advised to be evaluated by ophthalmology.  Advice and education were given regarding nutrition and aerobic exercise.

## 2023-05-16 LAB
ALBUMIN SERPL-MCNC: 4.4 G/DL (ref 3.5–5.2)
ALBUMIN/GLOB SERPL: 2.2 G/DL
ALP SERPL-CCNC: 86 U/L (ref 39–117)
ALT SERPL-CCNC: 13 U/L (ref 1–33)
AST SERPL-CCNC: 13 U/L (ref 1–32)
BILIRUB SERPL-MCNC: 0.4 MG/DL (ref 0–1.2)
BUN SERPL-MCNC: 8 MG/DL (ref 6–20)
BUN/CREAT SERPL: 14.5 (ref 7–25)
CALCIUM SERPL-MCNC: 9.3 MG/DL (ref 8.6–10.5)
CHLORIDE SERPL-SCNC: 104 MMOL/L (ref 98–107)
CHOLEST SERPL-MCNC: 143 MG/DL (ref 0–200)
CO2 SERPL-SCNC: 23.1 MMOL/L (ref 22–29)
CREAT SERPL-MCNC: 0.55 MG/DL (ref 0.57–1)
EGFRCR SERPLBLD CKD-EPI 2021: 122.8 ML/MIN/1.73
FOLATE SERPL-MCNC: 5.18 NG/ML (ref 4.78–24.2)
GLOBULIN SER CALC-MCNC: 2 GM/DL
GLUCOSE SERPL-MCNC: 86 MG/DL (ref 65–99)
HDLC SERPL-MCNC: 64 MG/DL (ref 40–60)
LDLC SERPL CALC-MCNC: 63 MG/DL (ref 0–100)
LDLC/HDLC SERPL: 0.96 {RATIO}
POTASSIUM SERPL-SCNC: 4.1 MMOL/L (ref 3.5–5.2)
PROT SERPL-MCNC: 6.4 G/DL (ref 6–8.5)
SODIUM SERPL-SCNC: 137 MMOL/L (ref 136–145)
TRIGL SERPL-MCNC: 87 MG/DL (ref 0–150)
TSH SERPL DL<=0.005 MIU/L-ACNC: 3.76 UIU/ML (ref 0.27–4.2)
VIT B12 SERPL-MCNC: 484 PG/ML (ref 211–946)
VLDLC SERPL CALC-MCNC: 16 MG/DL (ref 5–40)

## 2023-05-17 DIAGNOSIS — J45.30 MILD PERSISTENT ASTHMA, UNSPECIFIED WHETHER COMPLICATED: ICD-10-CM

## 2023-05-17 DIAGNOSIS — I10 PRIMARY HYPERTENSION: ICD-10-CM

## 2023-05-17 PROBLEM — R53.83 FATIGUE: Status: ACTIVE | Noted: 2023-05-17

## 2023-05-17 PROBLEM — E66.9 CLASS 1 OBESITY WITHOUT SERIOUS COMORBIDITY WITH BODY MASS INDEX (BMI) OF 30.0 TO 30.9 IN ADULT: Status: ACTIVE | Noted: 2023-05-17

## 2023-05-17 PROBLEM — E66.811 CLASS 1 OBESITY WITHOUT SERIOUS COMORBIDITY WITH BODY MASS INDEX (BMI) OF 30.0 TO 30.9 IN ADULT: Status: ACTIVE | Noted: 2023-05-17

## 2023-05-17 RX ORDER — LOSARTAN POTASSIUM 50 MG/1
50 TABLET ORAL DAILY
Qty: 30 TABLET | Refills: 0 | OUTPATIENT
Start: 2023-05-17

## 2023-05-17 RX ORDER — DILTIAZEM HYDROCHLORIDE 60 MG/1
TABLET, FILM COATED ORAL
Qty: 10.2 G | Refills: 0 | Status: SHIPPED | OUTPATIENT
Start: 2023-05-17

## 2023-05-17 NOTE — TELEPHONE ENCOUNTER
LOV             5/15/2023   NOV            6/21/2023   Last RF       3/27/23  symbicort    losartan (COZAAR) 50 MG tablet     The original prescription was discontinued on 5/15/2023 by Aleena Garcia APRN for the following reason: *Therapy completed. Renewing this prescription may not be appropriate.     Janeen Landa, CRISTINA/JAYR

## 2023-05-18 NOTE — ASSESSMENT & PLAN NOTE
Patient's (Body mass index is 30.23 kg/m².) indicates that they are obese (BMI >30) with health conditions that include hypertension . Weight is worsening. BMI  is above average; BMI management plan is completed. We discussed low calorie, low carb based diet program, portion control and increasing exercise.

## 2023-05-18 NOTE — ASSESSMENT & PLAN NOTE
Hypertension is possibly over treated.  Regular aerobic exercise.  Medication changes per orders.  Ambulatory blood pressure monitoring.  Blood pressure will be reassessed in 4 weeks.  Stop Losartan and try Hydrochlorothiazide daily.

## 2023-05-28 DIAGNOSIS — J45.30 MILD PERSISTENT ASTHMA, UNSPECIFIED WHETHER COMPLICATED: ICD-10-CM

## 2023-06-11 DIAGNOSIS — I10 PRIMARY HYPERTENSION: ICD-10-CM

## 2023-06-13 RX ORDER — HYDROCHLOROTHIAZIDE 12.5 MG/1
12.5 TABLET ORAL DAILY
Qty: 30 TABLET | Refills: 0 | Status: SHIPPED | OUTPATIENT
Start: 2023-06-13

## 2023-09-01 DIAGNOSIS — I10 PRIMARY HYPERTENSION: ICD-10-CM

## 2023-09-05 RX ORDER — HYDROCHLOROTHIAZIDE 12.5 MG/1
12.5 TABLET ORAL DAILY
Qty: 30 TABLET | Refills: 0 | Status: SHIPPED | OUTPATIENT
Start: 2023-09-05

## 2023-09-05 NOTE — TELEPHONE ENCOUNTER
Rx Refill Note  Requested Prescriptions     Pending Prescriptions Disp Refills    hydroCHLOROthiazide (HYDRODIURIL) 12.5 MG tablet [Pharmacy Med Name: HYDROCHLOROTHIAZIDE 12.5MG TABLETS] 30 tablet 0     Sig: TAKE 1 TABLET BY MOUTH DAILY      Last office visit with prescribing clinician: 5/15/2023   Last telemedicine visit with prescribing clinician: Visit date not found   Next office visit with prescribing clinician: Visit date not found                         Would you like a call back once the refill request has been completed: [] Yes [] No    If the office needs to give you a call back, can they leave a voicemail: [] Yes [] No    Yvnone Martinez MA  09/05/23, 08:32 EDT

## 2023-10-08 DIAGNOSIS — I10 PRIMARY HYPERTENSION: ICD-10-CM

## 2023-10-09 RX ORDER — HYDROCHLOROTHIAZIDE 12.5 MG/1
12.5 TABLET ORAL DAILY
Qty: 30 TABLET | Refills: 0 | Status: SHIPPED | OUTPATIENT
Start: 2023-10-09

## 2023-11-01 ENCOUNTER — OFFICE VISIT (OUTPATIENT)
Dept: FAMILY MEDICINE CLINIC | Facility: CLINIC | Age: 33
End: 2023-11-01
Payer: MEDICAID

## 2023-11-01 VITALS
OXYGEN SATURATION: 100 % | DIASTOLIC BLOOD PRESSURE: 82 MMHG | HEART RATE: 92 BPM | HEIGHT: 61 IN | BODY MASS INDEX: 28.89 KG/M2 | SYSTOLIC BLOOD PRESSURE: 128 MMHG | WEIGHT: 153 LBS

## 2023-11-01 DIAGNOSIS — I10 PRIMARY HYPERTENSION: Primary | ICD-10-CM

## 2023-11-01 DIAGNOSIS — R07.89 CHEST DISCOMFORT: ICD-10-CM

## 2023-11-01 DIAGNOSIS — R53.83 FATIGUE, UNSPECIFIED TYPE: ICD-10-CM

## 2023-11-01 DIAGNOSIS — J45.30 MILD PERSISTENT ASTHMA, UNSPECIFIED WHETHER COMPLICATED: ICD-10-CM

## 2023-11-01 DIAGNOSIS — F33.0 MILD EPISODE OF RECURRENT MAJOR DEPRESSIVE DISORDER: ICD-10-CM

## 2023-11-01 DIAGNOSIS — F41.9 ANXIETY: ICD-10-CM

## 2023-11-01 DIAGNOSIS — R60.0 LOCALIZED EDEMA: ICD-10-CM

## 2023-11-01 PROCEDURE — 93000 ELECTROCARDIOGRAM COMPLETE: CPT | Performed by: NURSE PRACTITIONER

## 2023-11-01 PROCEDURE — 99214 OFFICE O/P EST MOD 30 MIN: CPT | Performed by: NURSE PRACTITIONER

## 2023-11-01 PROCEDURE — 1160F RVW MEDS BY RX/DR IN RCRD: CPT | Performed by: NURSE PRACTITIONER

## 2023-11-01 PROCEDURE — 3074F SYST BP LT 130 MM HG: CPT | Performed by: NURSE PRACTITIONER

## 2023-11-01 PROCEDURE — 3079F DIAST BP 80-89 MM HG: CPT | Performed by: NURSE PRACTITIONER

## 2023-11-01 PROCEDURE — 1159F MED LIST DOCD IN RCRD: CPT | Performed by: NURSE PRACTITIONER

## 2023-11-01 RX ORDER — HYDROCHLOROTHIAZIDE 25 MG/1
25 TABLET ORAL DAILY
Qty: 30 TABLET | Refills: 1 | Status: SHIPPED | OUTPATIENT
Start: 2023-11-01

## 2023-11-01 RX ORDER — BUDESONIDE AND FORMOTEROL FUMARATE DIHYDRATE 80; 4.5 UG/1; UG/1
2 AEROSOL RESPIRATORY (INHALATION) 2 TIMES DAILY
Qty: 10.2 G | Refills: 1 | Status: SHIPPED | OUTPATIENT
Start: 2023-11-01

## 2023-11-01 RX ORDER — FLUOXETINE HYDROCHLORIDE 20 MG/1
20 CAPSULE ORAL DAILY
Qty: 30 CAPSULE | Refills: 1 | Status: SHIPPED | OUTPATIENT
Start: 2023-11-01

## 2023-11-01 RX ORDER — DILTIAZEM HYDROCHLORIDE 60 MG/1
TABLET, FILM COATED ORAL
Qty: 10.2 G | Refills: 0 | OUTPATIENT
Start: 2023-11-01

## 2023-11-01 NOTE — PROGRESS NOTES
"Subjective   Isabella Christiansen is a 33 y.o. female.     Chief Complaint   Patient presents with    Med Refill    Stress    Hypertension     Follow up       History of Present Illness   Patient presents for follow up HTN: changed to HCTZ daily instead of Losartan daily and helped mild swelling; has noted swelling in hands and ankles later in the day again recently; monitors BP, typically runs 120s/80s; some headaches recently, has had some sinus symptoms; no orthostasis; minimal salt in diet; regular exercise, stays active, never sits; would like to get back to gym to help feel better.    Also, c/o stress; \"my mind is everywhere;\" takes care of her mother who has dementia and has 3 children; has trouble focusing and seems to be getting worse; mind feels like it is overloaded; has been forgetting to pay bills; has had trouble staying asleep and will have trouble falling back to sleep; has been on medication for ADD when younger; some decreased appetite, but still eats; has tried to decrease portions to lose weight; see PHQ-9 and BRITTANY-7; no SI/HI; would like to try medication; has been on Adderall and Xanax after parents divorce as a teen; has tried Sertraline in past and did not help; took Fluoxetine in past and helped; previously took Fluoxetine 20 mg in morning and 10 mg in afternoon.     F/U smoking: tried nicotine patch and did not like how felt on patch; currently smoking about 1 pack per day; would like to quit, but not ready right now with everything going on.     Has not been using Symbicort recently; sleeps in basement and had mold recently; will need to use Albuterol inhaler when wakes up and then symptoms resolve; needs refills of Symbicort and Albuterol.       The following portions of the patient's history were reviewed and updated as appropriate: allergies, current medications, past family history, past medical history, past social history, past surgical history and problem list.    Current Outpatient " Medications on File Prior to Visit   Medication Sig    [DISCONTINUED] hydroCHLOROthiazide (HYDRODIURIL) 12.5 MG tablet TAKE 1 TABLET BY MOUTH DAILY    [DISCONTINUED] ProAir  (90 Base) MCG/ACT inhaler 2 puffs Every 4 (Four) Hours As Needed. for wheezing    [DISCONTINUED] Symbicort 80-4.5 MCG/ACT inhaler INHALE 2 PUFFS BY MOUTH TWICE DAILY (Patient not taking: Reported on 2023)     No current facility-administered medications on file prior to visit.        Past Medical History:   Diagnosis Date    Allergy to ACE inhibitors 2016    Allergy to morphine 2016    Allergy to NSAIDs 2016    Asthma     Hypertension 2023       Past Surgical History:   Procedure Laterality Date    BREAST AUGMENTATION       SECTION       SECTION N/A 2022    Procedure:  SECTION REPEAT;  Surgeon: Jeffery Cooper MD;  Location: [facility] LABOR DELIVERY;  Service: Obstetrics/Gynecology;  Laterality: N/A;     SECTION      COSMETIC SURGERY  2006       Family History   Problem Relation Age of Onset    Hypertension Mother     Dementia Mother     Stroke Mother 65    Cancer Mother         Lung    Heart disease Mother     Hyperlipidemia Mother     Diabetes Father     Hypertension Father     Hyperlipidemia Father     Stroke Father     Hyperlipidemia Sister        Social History     Socioeconomic History    Marital status:    Tobacco Use    Smoking status: Every Day     Packs/day: 1.00     Years: 4.00     Additional pack years: 0.00     Total pack years: 4.00     Types: Cigarettes    Smokeless tobacco: Never    Tobacco comments:     Previously smoking about 1 pack per day for about 5 years, currently smoking less than half pack daily; back to smoking a pack per day   Vaping Use    Vaping Use: Never used   Substance and Sexual Activity    Alcohol use: Not Currently    Drug use: Never    Sexual activity: Yes     Partners: Male     Birth control/protection: None       Review  of Systems   Constitutional:  Positive for fatigue. Negative for chills, fever, unexpected weight gain and unexpected weight loss. Appetite change: see HPI.  HENT:  Negative for ear pain, sore throat and trouble swallowing.    Eyes:  Negative for blurred vision.   Respiratory:  Cough: some at night and in mornings; nonproductive cough. Chest tightness: some in mornings. Shortness of breath: see HPI.    Cardiovascular:  Negative for palpitations. Chest pain: some at times with anxiety, resolves with relaxing.  Gastrointestinal:  Negative for abdominal pain, blood in stool, constipation, diarrhea, GERD and indigestion.   Endocrine: Negative for cold intolerance and heat intolerance. Polydipsia: some recently.  Genitourinary:  Negative for dysuria.   Skin:  Negative for rash.   Neurological:  Negative for syncope and weakness.   Hematological:  Does not bruise/bleed easily.       PHQ-9 Depression Screening  Little interest or pleasure in doing things? 1-->several days   Feeling down, depressed, or hopeless? 1-->several days   Trouble falling or staying asleep, or sleeping too much? 3-->nearly every day   Feeling tired or having little energy? 0-->not at all   Poor appetite or overeating? 1-->several days   Feeling bad about yourself - or that you are a failure or have let yourself or your family down? 0-->not at all   Trouble concentrating on things, such as reading the newspaper or watching television? 3-->nearly every day   Moving or speaking so slowly that other people could have noticed? Or the opposite - being so fidgety or restless that you have been moving around a lot more than usual? 1-->several days   Thoughts that you would be better off dead, or of hurting yourself in some way? 0-->not at all   PHQ-9 Total Score 10   If you checked off any problems, how difficult have these problems made it for you to do your work, take care of things at home, or get along with other people? somewhat difficult     BRITTANY-7  "anxiety score: 6      Objective   Vitals:    11/01/23 0954 11/01/23 1020   BP: 128/82    BP Location: Right arm    Patient Position: Sitting    Cuff Size: Adult    Pulse: 101 92   SpO2: 100%    Weight: 69.4 kg (153 lb)    Height: 154.9 cm (61\")      Body mass index is 28.91 kg/m².    Physical Exam  Vitals and nursing note reviewed.   Constitutional:       General: She is not in acute distress.     Appearance: She is well-developed and well-groomed. She is not diaphoretic.   HENT:      Head: Normocephalic.      Right Ear: External ear normal. No decreased hearing noted. Right ear middle ear effusion: mild. Tympanic membrane is not erythematous.      Left Ear: Tympanic membrane and external ear normal. No decreased hearing noted.      Nose: Nose normal.      Right Sinus: No maxillary sinus tenderness or frontal sinus tenderness.      Left Sinus: No maxillary sinus tenderness or frontal sinus tenderness.      Mouth/Throat:      Mouth: Mucous membranes are moist.      Pharynx: No oropharyngeal exudate or posterior oropharyngeal erythema.   Eyes:      Conjunctiva/sclera: Conjunctivae normal.   Neck:      Vascular: No carotid bruit.   Cardiovascular:      Rate and Rhythm: Normal rate and regular rhythm.      Pulses: Normal pulses.      Heart sounds: Normal heart sounds. No murmur heard.  Pulmonary:      Effort: Pulmonary effort is normal. No respiratory distress.      Breath sounds: Normal breath sounds.   Abdominal:      General: Bowel sounds are normal.      Palpations: Abdomen is soft. There is no hepatomegaly or splenomegaly.      Tenderness: There is no abdominal tenderness. There is no guarding.   Musculoskeletal:      Cervical back: Normal range of motion and neck supple.      Right lower leg: No edema.      Left lower leg: No edema.   Lymphadenopathy:      Cervical: No cervical adenopathy.   Skin:     General: Skin is warm and dry.      Findings: No rash.   Neurological:      Mental Status: She is alert and " oriented to person, place, and time.      Gait: Gait normal.   Psychiatric:         Mood and Affect: Mood normal.         Behavior: Behavior normal.         Thought Content: Thought content normal.         Cognition and Memory: Cognition normal.         Judgment: Judgment normal.           ECG 12 Lead    Date/Time: 11/1/2023 10:27 AM  Performed by: Aleena Garcia APRN    Authorized by: Aleena Garcia APRN  Comparison: not compared with previous ECG   Previous ECG: no previous ECG available  Rhythm: sinus rhythm  Rate: normal  T Waves: T waves normal    Clinical impression: normal ECG            Lab Results   Component Value Date    WBC 6.1 02/22/2023    RBC 5.01 02/22/2023    HGB 15.1 02/22/2023    HCT 46.1 02/22/2023    MCV 92 02/22/2023    MCH 30.1 02/22/2023    MCHC 32.8 02/22/2023    RDW 12.2 02/22/2023    RDWSD 40.7 01/13/2022    MPV 10.5 01/13/2022     02/22/2023    NEUTRORELPCT 49 02/22/2023    LYMPHORELPCT 36 02/22/2023    MONORELPCT 8 02/22/2023    EOSRELPCT 5 02/22/2023    BASORELPCT 1 02/22/2023    AUTOIGPER 1.6 (H) 01/13/2022    NEUTROABS 3.0 02/22/2023    LYMPHSABS 2.2 02/22/2023    MONOSABS 0.5 02/22/2023    EOSABS 0.3 02/22/2023    BASOSABS 0.0 02/22/2023    AUTOIGNUM 0.16 (H) 01/13/2022    NRBC 0.0 01/13/2022     Lab Results   Component Value Date    GLUCOSE 86 05/15/2023    BUN 8 05/15/2023    CREATININE 0.55 (L) 05/15/2023    BCR 14.5 05/15/2023    K 4.1 05/15/2023    CO2 23.1 05/15/2023    CALCIUM 9.3 05/15/2023    PROTENTOTREF 6.4 05/15/2023    ALBUMIN 4.4 05/15/2023    LABIL2 2.2 05/15/2023    AST 13 05/15/2023    ALT 13 05/15/2023      Lab Results   Component Value Date    CHLPL 143 05/15/2023    TRIG 87 05/15/2023    HDL 64 (H) 05/15/2023    VLDL 16 05/15/2023    LDL 63 05/15/2023     Lab Results   Component Value Date    TSH 3.760 05/15/2023         Assessment    Problem List Items Addressed This Visit       Primary hypertension - Primary    Current Assessment & Plan     Hypertension  is  stable .  Regular aerobic exercise.  Medication changes per orders.  Ambulatory blood pressure monitoring.  Blood pressure will be reassessed in 4 weeks.  Increase Hydrochlorothiazide to 25 mg daily.         Relevant Medications    hydroCHLOROthiazide (HYDRODIURIL) 25 MG tablet    Other Relevant Orders    Comprehensive Metabolic Panel    Mild persistent asthma    Current Assessment & Plan     Not well controlled.  Resume Symbicort twice daily.  Continue Albuterol inhaler as needed.         Relevant Medications    ProAir  (90 Base) MCG/ACT inhaler    budesonide-formoterol (Symbicort) 80-4.5 MCG/ACT inhaler    Fatigue    Relevant Orders    Comprehensive Metabolic Panel    Vitamin B12 & Folate    Mild episode of recurrent major depressive disorder    Current Assessment & Plan     Patient's depression is recurrent and is mild without psychosis. Their depression is currently active and the condition is newly identified. This will be reassessed in 4 weeks. F/U as described:patient was prescribed an antidepressant medicine.  Start Fluoxetine daily.         Relevant Medications    FLUoxetine (PROzac) 20 MG capsule    Anxiety    Current Assessment & Plan     Newly identified.  Start Fluoxetine daily.         Relevant Medications    FLUoxetine (PROzac) 20 MG capsule    Localized edema    Current Assessment & Plan     Increase Hydrochlorothiazide to 25 mg daily.         Relevant Medications    hydroCHLOROthiazide (HYDRODIURIL) 25 MG tablet     Other Visit Diagnoses       Chest discomfort        Relevant Orders    ECG 12 Lead             Return in about 1 month (around 12/1/2023) for Recheck.or sooner if symptoms persist or worsen.  Will try higher dose of HCTZ daily and see if helps swelling.  Will start Fluoxetine daily for mood; pt has taken in the past and helped; discussed AE/BBW with Fluoxetine.

## 2023-11-01 NOTE — PATIENT INSTRUCTIONS
Increase Hydrochlorothiazide to 25 mg daily.  Continue to monitor your blood pressure periodically and record results.  Continue to work on healthy diet and exercise.  Start Fluoxetine daily.  Follow up pending lab results.  Follow up in 1 month, or sooner if symptoms persist or worsen.

## 2023-11-02 LAB
ALBUMIN SERPL-MCNC: 4.6 G/DL (ref 3.5–5.2)
ALBUMIN/GLOB SERPL: 1.9 G/DL
ALP SERPL-CCNC: 104 U/L (ref 39–117)
ALT SERPL-CCNC: 13 U/L (ref 1–33)
AST SERPL-CCNC: 17 U/L (ref 1–32)
BILIRUB SERPL-MCNC: 0.3 MG/DL (ref 0–1.2)
BUN SERPL-MCNC: 6 MG/DL (ref 6–20)
BUN/CREAT SERPL: 8.8 (ref 7–25)
CALCIUM SERPL-MCNC: 10.1 MG/DL (ref 8.6–10.5)
CHLORIDE SERPL-SCNC: 102 MMOL/L (ref 98–107)
CO2 SERPL-SCNC: 28.8 MMOL/L (ref 22–29)
CREAT SERPL-MCNC: 0.68 MG/DL (ref 0.57–1)
EGFRCR SERPLBLD CKD-EPI 2021: 116.6 ML/MIN/1.73
FOLATE SERPL-MCNC: 2.53 NG/ML (ref 4.78–24.2)
GLOBULIN SER CALC-MCNC: 2.4 GM/DL
GLUCOSE SERPL-MCNC: 77 MG/DL (ref 65–99)
POTASSIUM SERPL-SCNC: 4.4 MMOL/L (ref 3.5–5.2)
PROT SERPL-MCNC: 7 G/DL (ref 6–8.5)
SODIUM SERPL-SCNC: 140 MMOL/L (ref 136–145)
VIT B12 SERPL-MCNC: 490 PG/ML (ref 211–946)

## 2023-11-02 NOTE — ASSESSMENT & PLAN NOTE
Patient's depression is recurrent and is mild without psychosis. Their depression is currently active and the condition is newly identified. This will be reassessed in 4 weeks. F/U as described:patient was prescribed an antidepressant medicine.  Start Fluoxetine daily.

## 2023-11-02 NOTE — ASSESSMENT & PLAN NOTE
Hypertension is  stable .  Regular aerobic exercise.  Medication changes per orders.  Ambulatory blood pressure monitoring.  Blood pressure will be reassessed in 4 weeks.  Increase Hydrochlorothiazide to 25 mg daily.

## 2023-11-04 DIAGNOSIS — E53.8 FOLATE DEFICIENCY: Primary | ICD-10-CM

## 2023-11-04 RX ORDER — FOLIC ACID 1 MG/1
1 TABLET ORAL DAILY
Start: 2023-11-04

## 2023-12-04 DIAGNOSIS — I10 PRIMARY HYPERTENSION: ICD-10-CM

## 2023-12-04 DIAGNOSIS — R60.0 LOCALIZED EDEMA: ICD-10-CM

## 2023-12-04 RX ORDER — HYDROCHLOROTHIAZIDE 25 MG/1
25 TABLET ORAL DAILY
Qty: 90 TABLET | Refills: 0 | Status: SHIPPED | OUTPATIENT
Start: 2023-12-04

## 2024-01-10 DIAGNOSIS — F33.0 MILD EPISODE OF RECURRENT MAJOR DEPRESSIVE DISORDER: ICD-10-CM

## 2024-01-10 DIAGNOSIS — J45.30 MILD PERSISTENT ASTHMA, UNSPECIFIED WHETHER COMPLICATED: ICD-10-CM

## 2024-01-10 DIAGNOSIS — F41.9 ANXIETY: ICD-10-CM

## 2024-01-10 RX ORDER — DILTIAZEM HYDROCHLORIDE 60 MG/1
2 TABLET, FILM COATED ORAL 2 TIMES DAILY
Qty: 10.2 G | Refills: 1 | Status: SHIPPED | OUTPATIENT
Start: 2024-01-10

## 2024-01-10 RX ORDER — FLUOXETINE HYDROCHLORIDE 20 MG/1
20 CAPSULE ORAL DAILY
Qty: 30 CAPSULE | Refills: 1 | Status: SHIPPED | OUTPATIENT
Start: 2024-01-10

## 2024-03-04 ENCOUNTER — TELEPHONE (OUTPATIENT)
Dept: OBSTETRICS AND GYNECOLOGY | Facility: CLINIC | Age: 34
End: 2024-03-04
Payer: MEDICAID

## 2024-03-04 NOTE — TELEPHONE ENCOUNTER
Pt states she has not had cycle for 8 wks. Has not taken at home pregnancy test because states she is scared. Are you ok with pt coming in for labs to confirm possible pregnancy?    Please advise,   Thank you

## 2024-03-07 DIAGNOSIS — N92.6 MISSED MENSES: Primary | ICD-10-CM

## 2024-03-07 LAB — HCG INTACT+B SERPL-ACNC: NORMAL MIU/ML

## 2024-03-08 ENCOUNTER — TELEPHONE (OUTPATIENT)
Dept: OBSTETRICS AND GYNECOLOGY | Facility: CLINIC | Age: 34
End: 2024-03-08

## 2024-03-08 NOTE — TELEPHONE ENCOUNTER
Micheline, I have reviewed the result, but am unable to sign off on it without access to Dr Cooper's in basket. I think Dr. Boateng might have his in basket at this time. -Elida

## 2024-03-08 NOTE — TELEPHONE ENCOUNTER
"  Caller: Isabella Christiansen \"Haley\"    Relationship to patient: Self    Best call back number: [phone]    Patient is needing: TO SPEAK WITH SOMEONE ABOUT TEST RESULTS THAT CAME BACK    OKAY TO [facility]        "

## 2024-03-11 DIAGNOSIS — R60.0 LOCALIZED EDEMA: ICD-10-CM

## 2024-03-11 DIAGNOSIS — I10 PRIMARY HYPERTENSION: ICD-10-CM

## 2024-03-11 RX ORDER — HYDROCHLOROTHIAZIDE 25 MG/1
25 TABLET ORAL DAILY
Qty: 90 TABLET | Refills: 0 | Status: SHIPPED | OUTPATIENT
Start: 2024-03-11 | End: 2024-03-13

## 2024-03-13 ENCOUNTER — INITIAL PRENATAL (OUTPATIENT)
Dept: OBSTETRICS AND GYNECOLOGY | Facility: CLINIC | Age: 34
End: 2024-03-13
Payer: MEDICAID

## 2024-03-13 VITALS — DIASTOLIC BLOOD PRESSURE: 78 MMHG | BODY MASS INDEX: 30.42 KG/M2 | WEIGHT: 161 LBS | SYSTOLIC BLOOD PRESSURE: 128 MMHG

## 2024-03-13 DIAGNOSIS — Z34.91 INITIAL OBSTETRIC VISIT IN FIRST TRIMESTER: Primary | ICD-10-CM

## 2024-03-13 DIAGNOSIS — Z98.891 HISTORY OF C-SECTION: ICD-10-CM

## 2024-03-13 DIAGNOSIS — Z3A.09 9 WEEKS GESTATION OF PREGNANCY: ICD-10-CM

## 2024-03-13 NOTE — PROGRESS NOTES
Initial ob visit     CC- Here to initiate prenatal care.    Isabella Christiansen is being seen today for her first obstetrical visit.  She is a 33 y.o.    9w0d gestation.   She has no complaints today and usual pregnancy symptoms noted.  No bleeding.    # 1 - Date: 07, Sex: Female, Weight: 3204 g (7 lb 1 oz), GA: None, Type: , Unspecified, Apgar1: None, Apgar5: None, Living: Living, Birth Comments: None    # 2 - Date: 01/22/10, Sex: Male, Weight: 2608 g (5 lb 12 oz), GA: None, Type: , Unspecified, Apgar1: None, Apgar5: None, Living: Living, Birth Comments: None    # 3 - Date: 2019, Sex: None, Weight: None, GA: None, Type: None, Apgar1: None, Apgar5: None, Living: None, Birth Comments: None    # 4 - Date: 22, Sex: Female, Weight: 2945 g (6 lb 7.9 oz), GA: 39w0d, Type: , Low Transverse, Apgar1: 8, Apgar5: 9, Living: Living, Birth Comments: [place]ida[place] 3    # 5 - Date: None, Sex: None, Weight: None, GA: None, Type: None, Apgar1: None, Apgar5: None, Living: None, Birth Comments: None      Current obstetric complaints : None      Prior obstetric issues, potential pregnancy concerns: Prior  x 3  Family history of genetic issues (includes FOB): Negative  Prior infections concerning in pregnancy (Rash, fever in last 2 weeks): Negative  Varicella Hx -vaccine  Prior testing for Cystic Fibrosis Carrier or Sickle Cell Trait-negative in 5 2021  Prepregnancy BMI - Body mass index is 30.42 kg/m².  Hx of HSV for patient or partner : Negative    Past Medical History:   Diagnosis Date    Allergy to ACE inhibitors 2016    Allergy to morphine 2016    Allergy to NSAIDs 2016    Asthma     Hypertension 2023       Past Surgical History:   Procedure Laterality Date    BREAST AUGMENTATION       SECTION       SECTION N/A 2022    Procedure:  SECTION REPEAT;  Surgeon: Jeffery Cooper MD;  Location: [facility] LABOR DELIVERY;  Service:  Obstetrics/Gynecology;  Laterality: N/A;     SECTION      COSMETIC SURGERY  2006         Current Outpatient Medications:     ProAir  (90 Base) MCG/ACT inhaler, Inhale 2 puffs Every 6 (Six) Hours As Needed for Wheezing or Shortness of Air. for wheezing, Disp: 18 g, Rfl: 0    Symbicort 80-4.5 MCG/ACT inhaler, INHALE 2 PUFFS BY MOUTH TWICE DAILY, Disp: 10.2 g, Rfl: 1    folic acid (FOLVITE) 1 MG tablet, Take 1 tablet by mouth Daily. (Patient not taking: Reported on 3/13/2024), Disp: , Rfl:     Allergies   Allergen Reactions    Lisinopril Other (See Comments)     shakes    Sulfamethoxazole-Trimethoprim Other (See Comments)     Migraine    Morphine Rash     Red and itching        Social History     Socioeconomic History    Marital status:    Tobacco Use    Smoking status: Every Day     Current packs/day: 1.00     Average packs/day: 1 pack/day for 4.0 years (4.0 ttl pk-yrs)     Types: Cigarettes    Smokeless tobacco: Never    Tobacco comments:     Previously smoking about 1 pack per day for about 5 years, currently smoking less than half pack daily; back to smoking a pack per day   Vaping Use    Vaping status: Never Used   Substance and Sexual Activity    Alcohol use: Not Currently    Drug use: Never    Sexual activity: Yes     Partners: Male     Birth control/protection: None       Family History   Problem Relation Age of Onset    Hypertension Mother     Dementia Mother     Stroke Mother 65    Cancer Mother         Lung    Heart disease Mother     Hyperlipidemia Mother     Diabetes Father     Hypertension Father     Hyperlipidemia Father     Stroke Father     Hyperlipidemia Sister        Review of systems     Constitutional : Nausea, fatigue mild   : Vaginal bleeding, cramping negative  Breast Tenderness : Mild  All other systems reviewed and are negative       Objective    /78   Wt 73 kg (161 lb)   LMP 01/10/2024   BMI 30.42 kg/m²       General Appearance:    Alert, cooperative,  in no acute distress, habitus normal   Head:    Normocephalic, without obvious abnormality, atraumatic   Eyes:            Lids and lashes normal, conjunctivae and sclerae normal, no   icterus, no pallor, corneas clear   Ears:    Ears appear intact with no abnormalities noted       Neck:   no thyromegaly, no mass.   Back:                         Lungs:     Clear to auscultation,respirations regular, even and     unlabored    Heart:    Regular rhythm and normal rate, normal S1 and S2, no            murmur, no gallop, no rub, no click   Breast Exam:    No masses, No nipple discharge   Abdomen:     Normal bowel sounds, no masses, no organomegaly, soft        non-tender, non-distended, no guarding, no rebound                 tenderness   Genitalia:      Extremities:   Moves all extremities well, no edema, no cyanosis, no              redness   Pulses:   Pulses palpable and equal bilaterally   Skin:   No bleeding, bruising or rash   Lymph nodes:   No palpable adenopathy   Neurologic:   Sensation intact, A&O times 3      Assessment & Plan     Diagnoses and all orders for this visit:    1. Initial obstetric visit in first trimester (Primary)    2. 9 weeks gestation of pregnancy  -     OB Panel With HIV  -     Urine Culture - , Urine, Clean Catch  -     Chlamydia trachomatis, Neisseria gonorrhoeae, Trichomonas vaginalis, PCR - Swab, Vagina    3. History of         1) Pregnancy at 9w0d  2) bedside ultrasound shows viable gestation about 8 to 9 weeks size.  There looks to be a dividing membrane and possible twin pregnancy but I cannot get good definition on the other sac.  Will get formal dating ultrasound for clarification.         Activity recommendation : 150 minutes/week of moderate intensity aerobic activity unless we limit for bleeding, hypertension or other pregnancy complication   Patient is on Prenatal vitamins  Problem list reviewed and updated.  Reviewed routine prenatal care with the office to include but  not limited to   Zika (travel restrictions/ok to use insect repellant), not to changing cat litter, food restrictions, avoidance of alcohol, tobacco and drugs and saunas/hot tubs.   All questions answered.     Jeffery Cooper MD   3/13/2024  13:51 EDT

## 2024-03-14 LAB
AMPHETAMINES UR QL SCN: NEGATIVE NG/ML
BARBITURATES UR QL SCN: NEGATIVE NG/ML
BENZODIAZ UR QL: NEGATIVE NG/ML
BZE UR QL: NEGATIVE NG/ML
C TRACH RRNA SPEC QL NAA+PROBE: NEGATIVE
CANNABINOIDS UR QL SCN: NEGATIVE NG/ML
METHADONE UR QL SCN: NEGATIVE NG/ML
N GONORRHOEA RRNA SPEC QL NAA+PROBE: NEGATIVE
OPIATES UR QL: NEGATIVE NG/ML
PCP UR QL SCN: NEGATIVE NG/ML
PROPOXYPH UR QL SCN: NEGATIVE NG/ML
T VAGINALIS RRNA SPEC QL NAA+PROBE: NEGATIVE

## 2024-03-15 LAB
BACTERIA UR CULT: NORMAL
BACTERIA UR CULT: NORMAL

## 2024-03-21 LAB
ABO GROUP BLD: NORMAL
BASOPHILS # BLD AUTO: 0 X10E3/UL (ref 0–0.2)
BASOPHILS NFR BLD AUTO: 0 %
BLD GP AB SCN SERPL QL: NEGATIVE
EOSINOPHIL # BLD AUTO: 0.3 X10E3/UL (ref 0–0.4)
EOSINOPHIL NFR BLD AUTO: 3 %
ERYTHROCYTE [DISTWIDTH] IN BLOOD BY AUTOMATED COUNT: 11.9 % (ref 11.7–15.4)
HBV SURFACE AG SERPL QL IA: NEGATIVE
HCT VFR BLD AUTO: 43.4 % (ref 34–46.6)
HCV IGG SERPL QL IA: NON REACTIVE
HGB BLD-MCNC: 14.5 G/DL (ref 11.1–15.9)
HIV 1+2 AB+HIV1 P24 AG SERPL QL IA: NON REACTIVE
IMM GRANULOCYTES # BLD AUTO: 0 X10E3/UL (ref 0–0.1)
IMM GRANULOCYTES NFR BLD AUTO: 0 %
LYMPHOCYTES # BLD AUTO: 2 X10E3/UL (ref 0.7–3.1)
LYMPHOCYTES NFR BLD AUTO: 24 %
MCH RBC QN AUTO: 30.1 PG (ref 26.6–33)
MCHC RBC AUTO-ENTMCNC: 33.4 G/DL (ref 31.5–35.7)
MCV RBC AUTO: 90 FL (ref 79–97)
MONOCYTES # BLD AUTO: 0.4 X10E3/UL (ref 0.1–0.9)
MONOCYTES NFR BLD AUTO: 5 %
NEUTROPHILS # BLD AUTO: 5.5 X10E3/UL (ref 1.4–7)
NEUTROPHILS NFR BLD AUTO: 68 %
PLATELET # BLD AUTO: 271 X10E3/UL (ref 150–450)
RBC # BLD AUTO: 4.82 X10E6/UL (ref 3.77–5.28)
RH BLD: POSITIVE
RPR SER QL: NON REACTIVE
RUBV IGG SERPL IA-ACNC: 9.32 INDEX
WBC # BLD AUTO: 8.1 X10E3/UL (ref 3.4–10.8)

## 2024-04-11 ENCOUNTER — ROUTINE PRENATAL (OUTPATIENT)
Dept: OBSTETRICS AND GYNECOLOGY | Facility: CLINIC | Age: 34
End: 2024-04-11
Payer: MEDICAID

## 2024-04-11 VITALS — BODY MASS INDEX: 30.5 KG/M2 | DIASTOLIC BLOOD PRESSURE: 81 MMHG | WEIGHT: 161.4 LBS | SYSTOLIC BLOOD PRESSURE: 119 MMHG

## 2024-04-11 DIAGNOSIS — Z3A.13 13 WEEKS GESTATION OF PREGNANCY: Primary | ICD-10-CM

## 2024-04-11 DIAGNOSIS — O46.8X1 SUBCHORIONIC HEMATOMA IN FIRST TRIMESTER, SINGLE OR UNSPECIFIED FETUS: ICD-10-CM

## 2024-04-11 DIAGNOSIS — O41.8X10 SUBCHORIONIC HEMATOMA IN FIRST TRIMESTER, SINGLE OR UNSPECIFIED FETUS: ICD-10-CM

## 2024-04-11 DIAGNOSIS — Z98.891 HISTORY OF C-SECTION: ICD-10-CM

## 2024-04-11 DIAGNOSIS — Z34.81 PRENATAL CARE, SUBSEQUENT PREGNANCY IN FIRST TRIMESTER: ICD-10-CM

## 2024-04-11 RX ORDER — PRENATAL VIT/IRON FUM/FOLIC AC 27MG-0.8MG
TABLET ORAL DAILY
COMMUNITY

## 2024-04-11 NOTE — PROGRESS NOTES
OB follow up     Isabella Christiansen is a 33 y.o.  13w1d being seen today for her obstetrical visit.  Patient reports no complaints. Fetal movement:  Too early .  Follow-up ultrasound for subchorionic hematoma shows it to be smaller and regressing in size compared to her first ultrasound at 9 weeks.  There is an appearance of a complete placenta previa today.    Her prenatal care is complicated by (and status): Prior  section x 3 and subchorionic hematoma    Review of Systems  Cramping/contractions : Negative  Vaginal bleeding: Negative  Fetal movement normal    /81   Wt 73.2 kg (161 lb 6.4 oz)   LMP 01/10/2024   BMI 30.50 kg/m²     FHT: 155 BPM   Uterine Size: size equals dates       Assessment    Diagnoses and all orders for this visit:    1. 13 weeks gestation of pregnancy (Primary)    2. Prenatal care, subsequent pregnancy in first trimester    3. History of     4. Subchorionic hematoma in first trimester, single or unspecified fetus        1) pregnancy at 13w1d         Plan    Reviewed this stage of pregnancy  Problem list updated   Follow up in 4 weeks.  Subchorionic hematoma, getting smaller in size.  Complete previa on today's ultrasound to be followed up with her next anatomy ultrasound at 21 weeks.  If continues with complete previa may consider maternal-fetal medicine consultation to rule out accreta spectrum.    Jeffery Cooper MD   2024  10:26 EDT

## 2024-04-25 ENCOUNTER — TELEPHONE (OUTPATIENT)
Dept: OBSTETRICS AND GYNECOLOGY | Facility: CLINIC | Age: 34
End: 2024-04-25
Payer: MEDICAID

## 2024-04-25 NOTE — TELEPHONE ENCOUNTER
"----- Message from Ramon García sent at 4/25/2024 12:29 PM EDT -----  Regarding: FW: Side pain  Contact: [phone]        ----- Message -----  From: Isabella Christiansen \"Haley\"  Sent: 4/25/2024   6:18 AM EDT  To: [facility]  Subject: Side pain                                        I have been experiencing really bad pain in my left side. It starts in my lower left abdominal and goes through my rib cage. It doesn’t feel like round ligament pain. It has woke me up multiple times last night from the pain. I have also been losing a clear like fluid often throughout the day.  Do I need to come in?  Thanks  "

## 2024-05-06 RX ORDER — HYDROXYZINE PAMOATE 25 MG/1
25 CAPSULE ORAL 3 TIMES DAILY PRN
Qty: 60 CAPSULE | Refills: 0 | Status: SHIPPED | OUTPATIENT
Start: 2024-05-06

## 2024-05-08 ENCOUNTER — ROUTINE PRENATAL (OUTPATIENT)
Dept: OBSTETRICS AND GYNECOLOGY | Facility: CLINIC | Age: 34
End: 2024-05-08
Payer: MEDICAID

## 2024-05-08 VITALS — BODY MASS INDEX: 30.61 KG/M2 | SYSTOLIC BLOOD PRESSURE: 127 MMHG | WEIGHT: 162 LBS | DIASTOLIC BLOOD PRESSURE: 82 MMHG

## 2024-05-08 DIAGNOSIS — Z34.82 PRENATAL CARE, SUBSEQUENT PREGNANCY IN SECOND TRIMESTER: ICD-10-CM

## 2024-05-08 DIAGNOSIS — Z98.891 HISTORY OF C-SECTION: ICD-10-CM

## 2024-05-08 DIAGNOSIS — Z3A.17 17 WEEKS GESTATION OF PREGNANCY: Primary | ICD-10-CM

## 2024-05-08 LAB
GLUCOSE UR STRIP-MCNC: NEGATIVE MG/DL
PROT UR STRIP-MCNC: NEGATIVE MG/DL

## 2024-05-28 DIAGNOSIS — J45.30 MILD PERSISTENT ASTHMA, UNSPECIFIED WHETHER COMPLICATED: ICD-10-CM

## 2024-05-28 DIAGNOSIS — Z34.90 PREGNANCY, UNSPECIFIED GESTATIONAL AGE: Primary | ICD-10-CM

## 2024-05-28 RX ORDER — BUDESONIDE AND FORMOTEROL FUMARATE DIHYDRATE 80; 4.5 UG/1; UG/1
2 AEROSOL RESPIRATORY (INHALATION) 2 TIMES DAILY
Qty: 10.2 G | Refills: 1 | OUTPATIENT
Start: 2024-05-28

## 2024-05-28 RX ORDER — PRENATAL VIT/IRON FUM/FOLIC AC 27MG-0.8MG
1 TABLET ORAL DAILY
Qty: 90 TABLET | Refills: 1 | Status: SHIPPED | OUTPATIENT
Start: 2024-05-28

## 2024-06-05 ENCOUNTER — ROUTINE PRENATAL (OUTPATIENT)
Dept: OBSTETRICS AND GYNECOLOGY | Facility: CLINIC | Age: 34
End: 2024-06-05
Payer: MEDICAID

## 2024-06-05 VITALS — WEIGHT: 161 LBS | BODY MASS INDEX: 30.42 KG/M2 | SYSTOLIC BLOOD PRESSURE: 120 MMHG | DIASTOLIC BLOOD PRESSURE: 77 MMHG

## 2024-06-05 DIAGNOSIS — Z34.92 SECOND TRIMESTER PREGNANCY: Primary | ICD-10-CM

## 2024-06-05 DIAGNOSIS — Z3A.21 21 WEEKS GESTATION OF PREGNANCY: ICD-10-CM

## 2024-06-05 LAB
GLUCOSE UR STRIP-MCNC: NEGATIVE MG/DL
PROT UR STRIP-MCNC: ABNORMAL MG/DL

## 2024-06-05 NOTE — PROGRESS NOTES
OB VISIT 20 WEEKS      Chief Complaint   Patient presents with    Routine Prenatal Visit         Isabella is a 34 y.o.  21w0d being seen today for her obstetrical visit and anatomy scan.  Patient reports no complaints. Fetal movement: normal. The patient currently sees Dr. Cooper.       REVIEW OF SYSTEMS  Cramping/contractions: denies  Vaginal bleeding: denies  Fetal movement: present    Review of Systems   Eyes:  Negative for blurred vision, double vision and visual disturbance.   Gastrointestinal:  Negative for abdominal pain.   Neurological:  Negative for light-headedness and headache.   All other systems reviewed and are negative.      /77   Wt 73 kg (161 lb)   LMP 01/10/2024   BMI 30.42 kg/m²     Prenatal Assessment  Fetal Heart Rate: 142  Movement: Present  Edema  LLE Edema: None  RLE Edema: None  Facial Edema: None    Physical Exam  Constitutional:       General: She is awake.      Appearance: Normal appearance. She is well-developed and well-groomed.   HENT:      Head: Normocephalic and atraumatic.   Pulmonary:      Effort: Pulmonary effort is normal.   Musculoskeletal:      Cervical back: Normal range of motion.   Neurological:      General: No focal deficit present.      Mental Status: She is alert and oriented to person, place, and time.   Skin:     General: Skin is warm and dry.   Psychiatric:         Mood and Affect: Mood normal.         Behavior: Behavior normal. Behavior is cooperative.   Vitals reviewed.         ASSESSMENT/PLAN    Diagnoses and all orders for this visit:    1. Second trimester pregnancy (Primary)  -     POC Urinalysis Dipstick    2. 21 weeks gestation of pregnancy  -     POC Urinalysis Dipstick         Urine dip today:  trace protein, negative glucose.   Anatomy scan US today, results discussed: incomplete - repeat scan at 24 weeks for remaining views needed.   Reviewed this stage of pregnancy.  Recommended prenatal classes and tour at [facility].   Problem list  updated.     Follow up in 4 weeks with Dr. Cooper.     I spent 15 minutes caring for Isabella on this date of service. This time includes time spent by me in the following activities: preparing for the visit, reviewing tests, obtaining and/or reviewing a separately obtained history, performing a medically appropriate examination and/or evaluation, counseling and educating the patient/family/caregiver, ordering medications, tests, or procedures, referring and communicating with other health care professionals, documenting information in the medical record, independently interpreting results and communicating that information with the patient/family/caregiver, and care coordination.    Renu Cage CNM  6/5/2024  12:49 EDT       ANATOMY SCAN ULTRASOUND PRELIMINARY RESULT     6/5/2024  21w0d    ANATOMY:  Incomplete, anatomy visualized is normal  HC: 19.82 cm  AC: 16.60 cm  FL: 3.52 cm  EFW: 427g, 3co16rc  AMNIOTIC FLUID: normal amount  PRESENTATION: vertex  PLACENTA: complete previa  MVP: 4.22 cm  CERVICAL LENGTH: 4.50 cm   COMPARATIVE DATA: not available for examination.  COMMENTS: posterior placenta previa visualized, follow up anatomy needed for face and fetal heart, appears male, suboptimal visualization of fetus was secondary to fetal position.  Renu Cage CNM  6/5/2024  12:49 EDT

## 2024-06-10 NOTE — TELEPHONE ENCOUNTER
Myles requested a refill on Hydrochlorothiazide 25 mg. However this medication was discontinued on 3/11/2024

## 2024-06-12 RX ORDER — HYDROXYZINE PAMOATE 25 MG/1
25 CAPSULE ORAL 3 TIMES DAILY PRN
Qty: 60 CAPSULE | Refills: 0 | OUTPATIENT
Start: 2024-06-12

## 2024-06-26 ENCOUNTER — ROUTINE PRENATAL (OUTPATIENT)
Dept: OBSTETRICS AND GYNECOLOGY | Facility: CLINIC | Age: 34
End: 2024-06-26
Payer: MEDICAID

## 2024-06-26 VITALS — BODY MASS INDEX: 30.76 KG/M2 | SYSTOLIC BLOOD PRESSURE: 130 MMHG | DIASTOLIC BLOOD PRESSURE: 85 MMHG | WEIGHT: 162.8 LBS

## 2024-06-26 DIAGNOSIS — Z3A.24 24 WEEKS GESTATION OF PREGNANCY: Primary | ICD-10-CM

## 2024-06-26 DIAGNOSIS — O44.02 PLACENTA PREVIA IN SECOND TRIMESTER: ICD-10-CM

## 2024-06-26 DIAGNOSIS — Z98.891 HISTORY OF C-SECTION: ICD-10-CM

## 2024-06-26 DIAGNOSIS — Z34.82 PRENATAL CARE, SUBSEQUENT PREGNANCY IN SECOND TRIMESTER: ICD-10-CM

## 2024-06-26 PROCEDURE — 99213 OFFICE O/P EST LOW 20 MIN: CPT | Performed by: OBSTETRICS & GYNECOLOGY

## 2024-06-26 NOTE — PROGRESS NOTES
OB follow up     Isabella Christiansen is a 34 y.o.  24w0d being seen today for her obstetrical visit.  Patient reports no complaints. Fetal movement: normal.    Her prenatal care is complicated by (and status): Prior  x 3 and posterior complete placenta previa    Review of Systems  Cramping/contractions : Negative  Vaginal bleeding: Negative  Fetal movement is normal    /85   Wt 73.8 kg (162 lb 12.8 oz)   LMP 01/10/2024   BMI 30.76 kg/m²     FHT: 130 BPM   Uterine Size: size equals dates       Assessment    Diagnoses and all orders for this visit:    1. 24 weeks gestation of pregnancy (Primary)    2. Prenatal care, subsequent pregnancy in second trimester    3. History of     4. Placenta previa in second trimester        1) pregnancy at 24w0d         Plan    Reviewed this stage of pregnancy  Problem list updated   Follow up in 4 weeks.  Routine warning signs with placenta previa discussed with patient.  Will discuss delivery plans and this is going to be a scheduled section.  Will do glucose screen and treponemal antibody on next visit.    Jeffery Cooper MD   2024  15:50 EDT

## 2024-07-24 ENCOUNTER — ROUTINE PRENATAL (OUTPATIENT)
Dept: OBSTETRICS AND GYNECOLOGY | Facility: CLINIC | Age: 34
End: 2024-07-24
Payer: MEDICAID

## 2024-07-24 VITALS — DIASTOLIC BLOOD PRESSURE: 84 MMHG | BODY MASS INDEX: 31.18 KG/M2 | WEIGHT: 165 LBS | SYSTOLIC BLOOD PRESSURE: 124 MMHG

## 2024-07-24 DIAGNOSIS — Z3A.28 28 WEEKS GESTATION OF PREGNANCY: Primary | ICD-10-CM

## 2024-07-24 DIAGNOSIS — O44.03 PLACENTA PREVIA IN THIRD TRIMESTER: ICD-10-CM

## 2024-07-24 DIAGNOSIS — Z34.83 PRENATAL CARE, SUBSEQUENT PREGNANCY IN THIRD TRIMESTER: ICD-10-CM

## 2024-07-24 DIAGNOSIS — Z98.891 HISTORY OF C-SECTION: ICD-10-CM

## 2024-07-24 LAB
GLUCOSE UR STRIP-MCNC: NEGATIVE MG/DL
PROT UR STRIP-MCNC: NEGATIVE MG/DL

## 2024-07-24 RX ORDER — SODIUM CHLORIDE 0.9 % (FLUSH) 0.9 %
10 SYRINGE (ML) INJECTION AS NEEDED
OUTPATIENT
Start: 2024-07-24

## 2024-07-24 RX ORDER — SODIUM CHLORIDE 9 MG/ML
40 INJECTION, SOLUTION INTRAVENOUS AS NEEDED
OUTPATIENT
Start: 2024-07-24

## 2024-07-24 RX ORDER — SODIUM CHLORIDE 0.9 % (FLUSH) 0.9 %
3 SYRINGE (ML) INJECTION EVERY 12 HOURS SCHEDULED
OUTPATIENT
Start: 2024-07-24

## 2024-07-24 NOTE — PROGRESS NOTES
OB follow up     Isabella Christiansen is a 34 y.o.  28w0d being seen today for her obstetrical visit.  Patient reports no complaints. Fetal movement: normal.    Her prenatal care is prior  x 3 and complete placenta previa complicated by (and status):     Review of Systems  Cramping/contractions : Negative  Vaginal bleeding: Negative  Fetal movement is normal    /84   Wt 74.8 kg (165 lb)   LMP 01/10/2024   BMI 31.18 kg/m²     FHT: 136 BPM   Uterine Size: size equals dates       Assessment    Diagnoses and all orders for this visit:    1. 28 weeks gestation of pregnancy (Primary)  -     POC Urinalysis Dipstick    2. Prenatal care, subsequent pregnancy in third trimester    3. History of     4. Placenta previa in third trimester        1) pregnancy at 28w0d         Plan    Reviewed this stage of pregnancy  Problem list updated   Follow up in 3 weeks for growth ultrasound.  We will work on scheduling repeat  with tubal sterilization on 2024 for complete previa in accordance with recommendations per American College of OB/GYN and the Society for  maternal-fetal medicine.    Jeffery Cooper MD   2024  11:57 EDT

## 2024-07-26 PROBLEM — O44.03 PLACENTA PREVIA IN THIRD TRIMESTER: Status: ACTIVE | Noted: 2024-07-24

## 2024-07-26 PROBLEM — Z98.891 HISTORY OF C-SECTION: Status: ACTIVE | Noted: 2024-07-24

## 2024-08-04 ENCOUNTER — HOSPITAL ENCOUNTER (INPATIENT)
Facility: HOSPITAL | Age: 34
LOS: 2 days | Discharge: HOME | End: 2024-08-06
Attending: OBSTETRICS & GYNECOLOGY | Admitting: OBSTETRICS & GYNECOLOGY
Payer: MEDICAID

## 2024-08-04 PROBLEM — O20.9 VAGINAL BLEEDING AFFECTING EARLY PREGNANCY: Status: ACTIVE | Noted: 2024-08-04

## 2024-08-04 LAB
ABO GROUP BLD: NORMAL
ALBUMIN SERPL-MCNC: 3.4 G/DL (ref 3.5–5.2)
ALBUMIN/GLOB SERPL: 1.4 G/DL
ALP SERPL-CCNC: 127 U/L (ref 39–117)
ALT SERPL W P-5'-P-CCNC: 10 U/L (ref 1–33)
ANION GAP SERPL CALCULATED.3IONS-SCNC: 10 MMOL/L (ref 5–15)
APTT PPP: 25.1 SECONDS (ref 22.7–35.4)
AST SERPL-CCNC: 9 U/L (ref 1–32)
BACTERIA UR QL AUTO: ABNORMAL /HPF
BASOPHILS # BLD AUTO: 0.04 10*3/MM3 (ref 0–0.2)
BASOPHILS NFR BLD AUTO: 0.2 % (ref 0–1.5)
BILIRUB SERPL-MCNC: <0.2 MG/DL (ref 0–1.2)
BILIRUB UR QL STRIP: NEGATIVE
BLD GP AB SCN SERPL QL: NEGATIVE
BUN SERPL-MCNC: 2 MG/DL (ref 6–20)
BUN/CREAT SERPL: 4.8 (ref 7–25)
CALCIUM SPEC-SCNC: 8.4 MG/DL (ref 8.6–10.5)
CHLORIDE SERPL-SCNC: 106 MMOL/L (ref 98–107)
CLARITY UR: CLEAR
CO2 SERPL-SCNC: 21 MMOL/L (ref 22–29)
COLOR UR: YELLOW
CREAT SERPL-MCNC: 0.42 MG/DL (ref 0.57–1)
DEPRECATED RDW RBC AUTO: 40.3 FL (ref 37–54)
DEPRECATED RDW RBC AUTO: 40.3 FL (ref 37–54)
EGFRCR SERPLBLD CKD-EPI 2021: 130.2 ML/MIN/1.73
EOSINOPHIL # BLD AUTO: 0.35 10*3/MM3 (ref 0–0.4)
EOSINOPHIL NFR BLD AUTO: 1.9 % (ref 0.3–6.2)
ERYTHROCYTE [DISTWIDTH] IN BLOOD BY AUTOMATED COUNT: 12 % (ref 12.3–15.4)
ERYTHROCYTE [DISTWIDTH] IN BLOOD BY AUTOMATED COUNT: 12 % (ref 12.3–15.4)
FIBRINOGEN PPP-MCNC: 394 MG/DL (ref 219–464)
GLOBULIN UR ELPH-MCNC: 2.4 GM/DL
GLUCOSE SERPL-MCNC: 100 MG/DL (ref 65–99)
GLUCOSE UR STRIP-MCNC: NEGATIVE MG/DL
HCT VFR BLD AUTO: 34.9 % (ref 34–46.6)
HCT VFR BLD AUTO: 35.6 % (ref 34–46.6)
HGB BLD-MCNC: 11.5 G/DL (ref 12–15.9)
HGB BLD-MCNC: 11.8 G/DL (ref 12–15.9)
HGB UR QL STRIP.AUTO: ABNORMAL
HYALINE CASTS UR QL AUTO: ABNORMAL /LPF
IMM GRANULOCYTES # BLD AUTO: 0.11 10*3/MM3 (ref 0–0.05)
IMM GRANULOCYTES NFR BLD AUTO: 0.6 % (ref 0–0.5)
INR PPP: 1.03 (ref 0.9–1.1)
KETONES UR QL STRIP: ABNORMAL
LEUKOCYTE ESTERASE UR QL STRIP.AUTO: NEGATIVE
LYMPHOCYTES # BLD AUTO: 3.42 10*3/MM3 (ref 0.7–3.1)
LYMPHOCYTES NFR BLD AUTO: 19 % (ref 19.6–45.3)
MCH RBC QN AUTO: 30 PG (ref 26.6–33)
MCH RBC QN AUTO: 30.3 PG (ref 26.6–33)
MCHC RBC AUTO-ENTMCNC: 33 G/DL (ref 31.5–35.7)
MCHC RBC AUTO-ENTMCNC: 33.1 G/DL (ref 31.5–35.7)
MCV RBC AUTO: 91.1 FL (ref 79–97)
MCV RBC AUTO: 91.5 FL (ref 79–97)
MONOCYTES # BLD AUTO: 1.02 10*3/MM3 (ref 0.1–0.9)
MONOCYTES NFR BLD AUTO: 5.7 % (ref 5–12)
NEUTROPHILS NFR BLD AUTO: 13.06 10*3/MM3 (ref 1.7–7)
NEUTROPHILS NFR BLD AUTO: 72.6 % (ref 42.7–76)
NITRITE UR QL STRIP: NEGATIVE
NRBC BLD AUTO-RTO: 0 /100 WBC (ref 0–0.2)
PH UR STRIP.AUTO: 7 [PH] (ref 5–8)
PLATELET # BLD AUTO: 240 10*3/MM3 (ref 140–450)
PLATELET # BLD AUTO: 250 10*3/MM3 (ref 140–450)
PMV BLD AUTO: 10.1 FL (ref 6–12)
PMV BLD AUTO: 10.2 FL (ref 6–12)
POTASSIUM SERPL-SCNC: 3.2 MMOL/L (ref 3.5–5.2)
PROT SERPL-MCNC: 5.8 G/DL (ref 6–8.5)
PROT UR QL STRIP: NEGATIVE
PROTHROMBIN TIME: 13.7 SECONDS (ref 11.7–14.2)
RBC # BLD AUTO: 3.83 10*6/MM3 (ref 3.77–5.28)
RBC # BLD AUTO: 3.89 10*6/MM3 (ref 3.77–5.28)
RBC # UR STRIP: ABNORMAL /HPF
REF LAB TEST METHOD: ABNORMAL
RH BLD: POSITIVE
SODIUM SERPL-SCNC: 137 MMOL/L (ref 136–145)
SP GR UR STRIP: 1.01 (ref 1–1.03)
SQUAMOUS #/AREA URNS HPF: ABNORMAL /HPF
T&S EXPIRATION DATE: NORMAL
TREPONEMA PALLIDUM IGG+IGM AB [PRESENCE] IN SERUM OR PLASMA BY IMMUNOASSAY: NORMAL
UROBILINOGEN UR QL STRIP: ABNORMAL
WBC # UR STRIP: ABNORMAL /HPF
WBC NRBC COR # BLD AUTO: 11.53 10*3/MM3 (ref 3.4–10.8)
WBC NRBC COR # BLD AUTO: 18 10*3/MM3 (ref 3.4–10.8)

## 2024-08-04 PROCEDURE — 25010000002 BETAMETHASONE ACET & SOD PHOS PER 4 MG: Performed by: OBSTETRICS & GYNECOLOGY

## 2024-08-04 PROCEDURE — 81001 URINALYSIS AUTO W/SCOPE: CPT | Performed by: STUDENT IN AN ORGANIZED HEALTH CARE EDUCATION/TRAINING PROGRAM

## 2024-08-04 PROCEDURE — 86780 TREPONEMA PALLIDUM: CPT | Performed by: STUDENT IN AN ORGANIZED HEALTH CARE EDUCATION/TRAINING PROGRAM

## 2024-08-04 PROCEDURE — 85730 THROMBOPLASTIN TIME PARTIAL: CPT | Performed by: STUDENT IN AN ORGANIZED HEALTH CARE EDUCATION/TRAINING PROGRAM

## 2024-08-04 PROCEDURE — 85027 COMPLETE CBC AUTOMATED: CPT | Performed by: OBSTETRICS & GYNECOLOGY

## 2024-08-04 PROCEDURE — 86850 RBC ANTIBODY SCREEN: CPT | Performed by: STUDENT IN AN ORGANIZED HEALTH CARE EDUCATION/TRAINING PROGRAM

## 2024-08-04 PROCEDURE — 86900 BLOOD TYPING SEROLOGIC ABO: CPT | Performed by: STUDENT IN AN ORGANIZED HEALTH CARE EDUCATION/TRAINING PROGRAM

## 2024-08-04 PROCEDURE — 25810000003 LACTATED RINGERS PER 1000 ML: Performed by: STUDENT IN AN ORGANIZED HEALTH CARE EDUCATION/TRAINING PROGRAM

## 2024-08-04 PROCEDURE — 85610 PROTHROMBIN TIME: CPT | Performed by: STUDENT IN AN ORGANIZED HEALTH CARE EDUCATION/TRAINING PROGRAM

## 2024-08-04 PROCEDURE — 99221 1ST HOSP IP/OBS SF/LOW 40: CPT | Performed by: OBSTETRICS & GYNECOLOGY

## 2024-08-04 PROCEDURE — 80053 COMPREHEN METABOLIC PANEL: CPT | Performed by: STUDENT IN AN ORGANIZED HEALTH CARE EDUCATION/TRAINING PROGRAM

## 2024-08-04 PROCEDURE — 86901 BLOOD TYPING SEROLOGIC RH(D): CPT | Performed by: STUDENT IN AN ORGANIZED HEALTH CARE EDUCATION/TRAINING PROGRAM

## 2024-08-04 PROCEDURE — 85384 FIBRINOGEN ACTIVITY: CPT | Performed by: STUDENT IN AN ORGANIZED HEALTH CARE EDUCATION/TRAINING PROGRAM

## 2024-08-04 PROCEDURE — 85025 COMPLETE CBC W/AUTO DIFF WBC: CPT | Performed by: STUDENT IN AN ORGANIZED HEALTH CARE EDUCATION/TRAINING PROGRAM

## 2024-08-04 RX ORDER — ONDANSETRON 2 MG/ML
4 INJECTION INTRAMUSCULAR; INTRAVENOUS EVERY 8 HOURS PRN
Status: DISCONTINUED | OUTPATIENT
Start: 2024-08-04 | End: 2024-08-04 | Stop reason: SDUPTHER

## 2024-08-04 RX ORDER — SODIUM CHLORIDE 0.9 % (FLUSH) 0.9 %
10 SYRINGE (ML) INJECTION EVERY 12 HOURS SCHEDULED
OUTPATIENT
Start: 2024-08-04

## 2024-08-04 RX ORDER — SODIUM CHLORIDE 0.9 % (FLUSH) 0.9 %
10 SYRINGE (ML) INJECTION AS NEEDED
Status: DISCONTINUED | OUTPATIENT
Start: 2024-08-04 | End: 2024-08-06 | Stop reason: HOSPADM

## 2024-08-04 RX ORDER — BETAMETHASONE SODIUM PHOSPHATE AND BETAMETHASONE ACETATE 3; 3 MG/ML; MG/ML
12 INJECTION, SUSPENSION INTRA-ARTICULAR; INTRALESIONAL; INTRAMUSCULAR; SOFT TISSUE EVERY 24 HOURS
OUTPATIENT
Start: 2024-08-04 | End: 2024-08-06

## 2024-08-04 RX ORDER — ONDANSETRON 4 MG/1
8 TABLET, ORALLY DISINTEGRATING ORAL EVERY 8 HOURS PRN
Status: DISCONTINUED | OUTPATIENT
Start: 2024-08-04 | End: 2024-08-04 | Stop reason: SDUPTHER

## 2024-08-04 RX ORDER — SODIUM CHLORIDE 9 MG/ML
40 INJECTION, SOLUTION INTRAVENOUS AS NEEDED
OUTPATIENT
Start: 2024-08-04

## 2024-08-04 RX ORDER — FAMOTIDINE 20 MG/1
20 TABLET, FILM COATED ORAL 2 TIMES DAILY PRN
Status: DISCONTINUED | OUTPATIENT
Start: 2024-08-04 | End: 2024-08-06 | Stop reason: HOSPADM

## 2024-08-04 RX ORDER — ACETAMINOPHEN 325 MG/1
650 TABLET ORAL EVERY 4 HOURS PRN
OUTPATIENT
Start: 2024-08-04

## 2024-08-04 RX ORDER — DEXTROSE MONOHYDRATE AND SODIUM CHLORIDE 5; .45 G/100ML; G/100ML
125 INJECTION, SOLUTION INTRAVENOUS CONTINUOUS
OUTPATIENT
Start: 2024-08-04

## 2024-08-04 RX ORDER — BETAMETHASONE SODIUM PHOSPHATE AND BETAMETHASONE ACETATE 3; 3 MG/ML; MG/ML
12 INJECTION, SUSPENSION INTRA-ARTICULAR; INTRALESIONAL; INTRAMUSCULAR; SOFT TISSUE EVERY 24 HOURS
Status: COMPLETED | OUTPATIENT
Start: 2024-08-04 | End: 2024-08-05

## 2024-08-04 RX ORDER — DOCUSATE SODIUM 100 MG/1
100 CAPSULE, LIQUID FILLED ORAL 2 TIMES DAILY PRN
Status: DISCONTINUED | OUTPATIENT
Start: 2024-08-04 | End: 2024-08-06 | Stop reason: HOSPADM

## 2024-08-04 RX ORDER — ONDANSETRON 4 MG/1
8 TABLET, ORALLY DISINTEGRATING ORAL EVERY 8 HOURS PRN
Status: DISCONTINUED | OUTPATIENT
Start: 2024-08-04 | End: 2024-08-06 | Stop reason: HOSPADM

## 2024-08-04 RX ORDER — ONDANSETRON 2 MG/ML
4 INJECTION INTRAMUSCULAR; INTRAVENOUS EVERY 8 HOURS PRN
Status: DISCONTINUED | OUTPATIENT
Start: 2024-08-04 | End: 2024-08-06 | Stop reason: HOSPADM

## 2024-08-04 RX ORDER — BISACODYL 10 MG
10 SUPPOSITORY, RECTAL RECTAL DAILY PRN
OUTPATIENT
Start: 2024-08-04

## 2024-08-04 RX ORDER — SODIUM CHLORIDE 0.9 % (FLUSH) 0.9 %
10 SYRINGE (ML) INJECTION AS NEEDED
OUTPATIENT
Start: 2024-08-04

## 2024-08-04 RX ORDER — LIDOCAINE HYDROCHLORIDE 10 MG/ML
0.5 INJECTION, SOLUTION INFILTRATION; PERINEURAL ONCE AS NEEDED
OUTPATIENT
Start: 2024-08-04

## 2024-08-04 RX ORDER — SODIUM CHLORIDE 0.9 % (FLUSH) 0.9 %
10 SYRINGE (ML) INJECTION EVERY 12 HOURS SCHEDULED
Status: DISCONTINUED | OUTPATIENT
Start: 2024-08-04 | End: 2024-08-06 | Stop reason: HOSPADM

## 2024-08-04 RX ORDER — POTASSIUM CHLORIDE 750 MG/1
40 TABLET, FILM COATED, EXTENDED RELEASE ORAL EVERY 4 HOURS
Status: COMPLETED | OUTPATIENT
Start: 2024-08-04 | End: 2024-08-04

## 2024-08-04 RX ORDER — SODIUM CHLORIDE, SODIUM LACTATE, POTASSIUM CHLORIDE, CALCIUM CHLORIDE 600; 310; 30; 20 MG/100ML; MG/100ML; MG/100ML; MG/100ML
125 INJECTION, SOLUTION INTRAVENOUS CONTINUOUS
Status: DISCONTINUED | OUTPATIENT
Start: 2024-08-04 | End: 2024-08-05

## 2024-08-04 RX ORDER — BETAMETHASONE SODIUM PHOSPHATE AND BETAMETHASONE ACETATE 3; 3 MG/ML; MG/ML
12 INJECTION, SUSPENSION INTRA-ARTICULAR; INTRALESIONAL; INTRAMUSCULAR; SOFT TISSUE ONCE
OUTPATIENT
Start: 2024-08-04 | End: 2024-08-04

## 2024-08-04 RX ADMIN — SODIUM CHLORIDE, POTASSIUM CHLORIDE, SODIUM LACTATE AND CALCIUM CHLORIDE 125 ML/HR: 600; 310; 30; 20 INJECTION, SOLUTION INTRAVENOUS at 03:05

## 2024-08-04 RX ADMIN — POTASSIUM CHLORIDE 40 MEQ: 750 TABLET, EXTENDED RELEASE ORAL at 15:07

## 2024-08-04 RX ADMIN — SODIUM CHLORIDE, POTASSIUM CHLORIDE, SODIUM LACTATE AND CALCIUM CHLORIDE 125 ML/HR: 600; 310; 30; 20 INJECTION, SOLUTION INTRAVENOUS at 10:41

## 2024-08-04 RX ADMIN — SODIUM CHLORIDE, POTASSIUM CHLORIDE, SODIUM LACTATE AND CALCIUM CHLORIDE 125 ML/HR: 600; 310; 30; 20 INJECTION, SOLUTION INTRAVENOUS at 19:09

## 2024-08-04 RX ADMIN — POTASSIUM CHLORIDE 40 MEQ: 750 TABLET, EXTENDED RELEASE ORAL at 19:41

## 2024-08-04 RX ADMIN — BETAMETHASONE SODIUM PHOSPHATE AND BETAMETHASONE ACETATE 12 MG: 3; 3 INJECTION, SUSPENSION INTRA-ARTICULAR; INTRALESIONAL; INTRAMUSCULAR at 04:20

## 2024-08-04 NOTE — OBED NOTES
[facility] SUMEET Provider Note        2024 02:28 EDT    Isabella Christiansen is a 34 y.o.  at 29w4d KEYLA  10/16/2024, by Last Menstrual Period who presents for : Vaginal Bleeding (SUMEET. Pt reports heavy vaginal bleeding  starting at 2330 when going to the bathroom with some clots. Reports good FM. Denies contractions)          HPI: 34-year-old -0-1-3 at 29 weeks and 4 days presents with vaginal bleeding.  States that around 1145 this evening she had bright red bleeding that she noted when wiping.  Since arrival she has had minimal bleeding however her pad has some bright red streaks and she is passing some clots.  She has a history of a placenta previa noted at the time of her 13-week ultrasound and subsequent imaging.  No VB/LOF/HA/visual changes/N/V/F/C/dysuria     Active fetus Yes   denies ROM  deniescontractions, cramping   admits to bleeding    Prenatal care with Jeffery Cooper MD complicated by previa    Patient Active Problem List    Diagnosis     History of  [Z98.891]     Placenta previa in third trimester [O44.03]     Folate deficiency [E53.8]     Mild episode of recurrent major depressive disorder [F33.0]     Anxiety [F41.9]     Localized edema [R60.0]     Fatigue [R53.83]     Class 1 obesity without serious comorbidity with body mass index (BMI) of 30.0 to 30.9 in adult [E66.9, Z68.30]     Primary hypertension [I10]     Current smoker [F17.200]     Mild persistent asthma [J45.30]     Right chronic serous otitis media [H65.21]     Arthralgia of right temporomandibular joint [M26.621]     Heart palpitations [R00.2]     Insomnia [G47.00]     Attention deficit [R41.840]     Headache, variant migraine [G43.809]     Seasonal allergies [J30.2]     Chronic low back pain [M54.50, G89.29]     Lumbar facet arthropathy [M47.816]          POB:   OB History    Para Term  AB Living   5 3 1 0 1 3   SAB IAB Ectopic Molar Multiple Live Births   1 0 0 0 0 3      # Outcome Date GA Lbr  Lemuel/2nd Weight Sex Type Anes PTL Lv   5 Current            4 Term 22 39w0d  2945 g (6 lb 7.9 oz) F CS-LTranv Spinal N ROSENDO      Birth Comments: Donte 3      Name: DI BLACK      Apgar1: 8  Apgar5: 9   3 SAB 2019           2 Para 01/22/10   2608 g (5 lb 12 oz) M CS-Unspec   ROSENDO   1 Para 07   3204 g (7 lb 1 oz) F CS-Unspec   ROSENDO      PMH:   Past Medical History:   Diagnosis Date    Allergy to ACE inhibitors 2016    Allergy to morphine 2016    Allergy to NSAIDs 2016    Asthma     Hypertension 2023     PSH:   Past Surgical History:   Procedure Laterality Date    BREAST AUGMENTATION       SECTION       SECTION N/A 2022    Procedure:  SECTION REPEAT;  Surgeon: Jeffery Cooper MD;  Location: [facility] LABOR DELIVERY;  Service: Obstetrics/Gynecology;  Laterality: N/A;     SECTION      COSMETIC SURGERY  2006     FH:    Family History   Problem Relation Age of Onset    Hypertension Mother     Dementia Mother     Stroke Mother 65    Cancer Mother         Lung    Heart disease Mother     Hyperlipidemia Mother     Diabetes Father     Hypertension Father     Hyperlipidemia Father     Stroke Father     Hyperlipidemia Sister      SH:   Social History     Tobacco Use    Smoking status: Every Day     Current packs/day: 1.00     Average packs/day: 1 pack/day for 4.0 years (4.0 ttl pk-yrs)     Types: Cigarettes    Smokeless tobacco: Never    Tobacco comments:     Previously smoking about 1 pack per day for about 5 years, currently smoking less than half pack daily; back to smoking a pack per day   Vaping Use    Vaping status: Never Used   Substance Use Topics    Alcohol use: Not Currently    Drug use: Never       Allergies: Lisinopril, Sulfamethoxazole-trimethoprim, and Morphine    Medications:   Medications Prior to Admission   Medication Sig Dispense Refill Last Dose    Prenatal Vit-Fe Fumarate-FA (prenatal vitamin 27-0.8) 27-0.8 MG tablet  "tablet Take 1 tablet by mouth Daily. 90 tablet 1 8/3/2024    Symbicort 80-4.5 MCG/ACT inhaler INHALE 2 PUFFS BY MOUTH TWICE DAILY 10.2 g 1 8/4/2024 at 0100       Review of Systems  A 14 system review was completed and negative except for what is noted in the HPI or below  Pertinent items are noted in HPI      Physical exam    Temp:  [98.3 °F (36.8 °C)] 98.3 °F (36.8 °C)  Heart Rate:  [104] 104  BP: (146)/(90) 146/90  Estimated body mass index is 29.23 kg/m² as calculated from the following:    Height as of this encounter: 160 cm (63\").    Weight as of this encounter: 74.8 kg (165 lb).    General appearance - alert, well appearing, and in no distress  Mental status - alert, oriented to person, place, and time  Back exam - full range of motion, no tenderness, palpable spasm or pain on motion  Neurological - alert, oriented, normal speech, no focal findings or movement disorder noted  Musculoskeletal - no joint tenderness, deformity or swelling  Extremities - peripheral pulses normal, no pedal edema, no clubbing or cyanosis           Membranes: Intact                      FHR: 150's moderate variability + accels no decels cat 1   CTX: irritability     U/S reviewed: not performed.       External Prenatal Results       Pregnancy Outside Results - Transcribed From Office Records - See Scanned Records For Details       Test Value Date Time    ABO  AB  03/20/24 1005    Rh  Positive  03/20/24 1005    Antibody Screen  Negative  03/20/24 1005    Varicella IgG       Rubella  9.32 index 03/20/24 1005    Hgb  12.3 g/dL 07/24/24 1201       14.5 g/dL 03/20/24 1005    Hct  36.8 % 07/24/24 1201       43.4 % 03/20/24 1005    HgB A1c        1h GTT  106 mg/dL 07/24/24 1201    3h GTT Fasting       3h GTT 1 hour       3h GTT 2 hour       3h GTT 3 hour        Gonorrhea (discrete)  Negative  03/13/24 1533    Chlamydia (discrete)  Negative  03/13/24 1533    RPR  Non Reactive  03/20/24 1005    Syphils cascade: TP-Ab (FTA)  Non Reactive  " 24 1201    TP-Ab       TP-Ab (EIA)       TPPA       HBsAg  Negative  24 1005    Herpes Simplex Virus PCR       Herpes Simplex VIrus Culture       HIV  Non Reactive  24 1005    Hep C RNA Quant PCR       Hep C Antibody  Non Reactive  24 1005    AFP       NIPT       Cystic Fibroisis        Group B Strep       GBS Susceptibility to Clindamycin       GBS Susceptibility to Erythromycin       Fetal Fibronectin       Genetic Testing, Maternal Blood                 Drug Screening       Test Value Date Time    Urine Drug Screen       Amphetamine Screen  Negative ng/mL 24 1529    Barbiturate Screen  Negative ng/mL 24 1529    Benzodiazepine Screen  Negative ng/mL 24 1529    Methadone Screen  Negative ng/mL 24 1529    Phencyclidine Screen  Negative ng/mL 24 1529    Opiates Screen       THC Screen       Cocaine Screen       Propoxyphene Screen  Negative ng/mL 24 1529    Buprenorphine Screen       Methamphetamine Screen       Oxycodone Screen       Tricyclic Antidepressants Screen                 Legend    ^: Historical                              Impression:   @ 29w4d .  Final Diagnosis: Bleeding previa    Plan:  1. fetal and uterine monitoring continuously, maternal labs including , and steroids for fetal benefit  2. Plan of care has been reviewed with patient   3.  Risks, benefits of treatment plan have been discussed.  4.  All questions have been answered.         I discussed the patients findings and my recommendations with patient      Greg Montes De Oca MD  2024  02:28 EDT

## 2024-08-04 NOTE — PROGRESS NOTES
Antepartum Progress Note    Patient name: Isabella Christiansen  Age: 34 y.o.  Sex: female  YOB: 1990   MRN: [id number]  Admission Date: 2024    Gestational age: 29w4d    Chief Complaint   Patient presents with    Vaginal Bleeding     SUMEET. Pt reports heavy vaginal bleeding  starting at 2330 when going to the bathroom with some clots. Reports good FM. Denies contractions      Subjective:    Isabella Christiansen is a 34 y.o.  at 29w4d who presented with vaginal bleeding that started at 2330 last night. She has pregnancy complicated by complete placenta previa and history of 3 previous  sections.     This morning she has had a small clot out of the vagina at 0745 and has small amount of dark red blood on her pad. Denies abdominal pain, contractions, leakage of fluid. She reports active fetal movement.     Objective:   Temp:  [98.1 °F (36.7 °C)-98.3 °F (36.8 °C)] 98.1 °F (36.7 °C)  Heart Rate:  [] 91  Resp:  [16] 16  BP: (128-146)/(78-90) 128/78    Exam:     Physical Examination: General appearance - alert, well appearing, and in no distress  Mental status - alert, oriented to person, place, and time  Eyes - sclera anicteric, left eye normal, right eye normal  Ears - right ear normal, left ear normal  Neck - normal ROM  Chest - no increased work of breathing, regular respirations   Abdomen - soft, nontender, gravid   Pelvic - deferred- pad with small amount of blood   Neurological - alert, oriented, normal speech, no focal findings or movement disorder noted  Musculoskeletal - no joint tenderness, deformity or swelling  Extremities - no pedal edema noted  Skin - normal coloration and turgor, no rashes, no suspicious skin lesions noted    Labs:  Lab Results   Component Value Date    WBC 18.00 (H) 2024    HGB 11.8 (L) 2024    HCT 35.6 2024    MCV 91.5 2024     2024     CMP          2023    10:36 2024    03:04   CMP   Glucose 77  100    BUN 6   2    Creatinine 0.68  0.42    EGFR  130.2    Sodium 140  137    Potassium 4.4  3.2    Chloride 102  106    Calcium 10.1  8.4    Total Protein 7.0     Total Protein  5.8    Albumin 4.6  3.4    Globulin 2.4     Globulin  2.4    Total Bilirubin 0.3  <0.2    Alkaline Phosphatase 104  127    AST (SGOT) 17  9    ALT (SGPT) 13  10    Albumin/Globulin Ratio  1.4    BUN/Creatinine Ratio 8.8  4.8    Anion Gap  10.0          NST: 120s baseline, moderate variability, accels +, decels -  [place]: quiet          Assessment:      IUP at 29w4d   Vaginal bleeding in setting of complete placenta previa  History of 3 previous  sections  Unwanted fertility   Leukocytosis     Plan:    Patient admitted for vaginal bleeding in setting of complete placenta previa. The patient had small amount of bleeding this morning. Will continue to monitor. Patient is to remain on continuous fetal monitoring and tocometry until at least 24 hours out from last bleeding episodes. She should be observed for at least 5 days in regards to bleeding.  steroids been started for fetal benefit if  delivery indicated. Will plan for magnesium sulfate for neuroprotection if concern for imminent delivery. Will allow clear diet at this time. Patient is aware that if maternal deterioration of fetal distress occur, emergency  section will be planned. Will order ultrasound and MFM consult for tomorrow regarding placenta previa. Reviewed labs and will plan to repeat CBC today and order AM labs for tomorrow. Unsure of etiology of leukocytosis of 18 K and will continue to monitor.     Dispo: Inpatient management of at least 5 days      Eloisa Yuen MD  2024  09:16 EDT

## 2024-08-04 NOTE — PLAN OF CARE
Goal Outcome Evaluation:  Plan of Care Reviewed With: patient        Progress: no change  Outcome Evaluation: VSS. Pt denies pain. Pt has had small amount of bright red bleeding today when she wipes after voiding. MFM consult tomorrow with US ordered. Pt visiting with family this afternoon.

## 2024-08-04 NOTE — H&P
H&P Note    Patient Identification:  Name: Isabella Christiansen  Age: 34 y.o.  Sex: female  :  1990  MRN: [id number]                       Chief Complaint: Vaginal bleeding    History of Present Illness:   34-year-old  5 para 3 presents at 29-4/7 weeks with an episode of vaginal bleeding.  Patient noticed moderate bleeding at home.  This slowly decreased and ultimately stopped.  The patient did not have any pain associated with her bleeding.  Denies any trauma to the abdomen or pelvis.  History is significant for a posterior complete previa which was initially diagnosed at 13 weeks and has been persistent on subsequent imaging.  Blood type is AB+.  The patient's history is further significant for 3 prior  deliveries.  Currently, she is not experiencing pain.  She reports active fetal movement.    Problem List:  [unfilled]  Past Medical History:  Past Medical History:   Diagnosis Date    Allergy to ACE inhibitors 2016    Allergy to morphine 2016    Allergy to NSAIDs 2016    Asthma     Hypertension 2023     Past Surgical History:  Past Surgical History:   Procedure Laterality Date    BREAST AUGMENTATION       SECTION       SECTION N/A 2022    Procedure:  SECTION REPEAT;  Surgeon: Jeffery Cooper MD;  Location: [facility] DELIVERY;  Service: Obstetrics/Gynecology;  Laterality: N/A;     SECTION      COSMETIC SURGERY  2006      Home Meds:  Medications Prior to Admission   Medication Sig Dispense Refill Last Dose    Prenatal Vit-Fe Fumarate-FA (prenatal vitamin 27-0.8) 27-0.8 MG tablet tablet Take 1 tablet by mouth Daily. 90 tablet 1 8/3/2024    Symbicort 80-4.5 MCG/ACT inhaler INHALE 2 PUFFS BY MOUTH TWICE DAILY 10.2 g 1 2024 at 0100     Current Meds:   [unfilled]  Allergies:  Allergies   Allergen Reactions    Lisinopril Other (See Comments)     shakes    Sulfamethoxazole-Trimethoprim Other (See Comments)     Migraine    Morphine  Rash     Red and itching      Immunizations:    There is no immunization history on file for this patient.  Social History:   Social History     Tobacco Use    Smoking status: Every Day     Current packs/day: 1.00     Average packs/day: 1 pack/day for 4.0 years (4.0 ttl pk-yrs)     Types: Cigarettes    Smokeless tobacco: Never    Tobacco comments:     Previously smoking about 1 pack per day for about 5 years, currently smoking less than half pack daily; back to smoking a pack per day   Substance Use Topics    Alcohol use: Not Currently      Family History:  Family History   Problem Relation Age of Onset    Hypertension Mother     Dementia Mother     Stroke Mother 65    Cancer Mother         Lung    Heart disease Mother     Hyperlipidemia Mother     Diabetes Father     Hypertension Father     Hyperlipidemia Father     Stroke Father     Hyperlipidemia Sister         Review of Systems  Positive for vaginal bleeding.  Negative for abdominal or pelvic pain.  Negative for nausea or vomiting.  Negative for fever or chills.  All other systems are reviewed and are negative.    Objective:  tMax 24 hrs: Temp (24hrs), Av.3 °F (36.8 °C), Min:98.3 °F (36.8 °C), Max:98.3 °F (36.8 °C)    Vitals Ranges:   Temp:  [98.3 °F (36.8 °C)] 98.3 °F (36.8 °C)  Heart Rate:  [104] 104  BP: (146)/(90) 146/90  Intake and Output Last 3 Shifts:   No intake/output data recorded.    Exam:     General Appearance:    Alert, cooperative, no distress, appears stated age   Head:    Normocephalic, without obvious abnormality, atraumatic   Back:     Symmetric, no curvature, ROM normal, no CVA tenderness   Lungs:     Clear to auscultation bilaterally, respirations unlabored   Chest Wall:    No tenderness or deformity    Heart:    Regular rate and rhythm, S1 and S2 normal, no murmur, rub   or gallop       Abdomen:   Soft, gravid.  There is no epigastric tenderness.  No right upper quadrant tenderness.  No fundal tenderness.  No suprapubic tenderness.    Genitalia:  Normal female external genitalia.  There is a small amount of old blood on the patient's pad.  There is no active vaginal bleeding.     Rectal:  Not examined today   Extremities: Equal in size   Skin:   Skin color, texture, turgor normal, no rashes or lesions   Lymph nodes:   Cervical, supraclavicular, and axillary nodes normal       Data Review:  Fetal heart tones are reactive.  [place] is quiet.   Lab Results (last 24 hours)       ** No results found for the last 24 hours. **          Assessment:    Vaginal bleeding affecting early pregnancy    History of     Placenta previa in third trimester      1.  Intrauterine pregnancy at 29-4/7 weeks  2.  Complete previa  3.  Episode of vaginal bleeding, now resolved.    Plan:  I counseled the patient regarding the pathophysiology of this condition and answered her questions.  We also discussed the risk that this could be a sentinel bleed with an additional bleed forthcoming.  We discussed my recommendations for bed rest in the hospital and continuous monitoring overnight.  Also, betamethasone in case  delivery is required.  We discussed my rationale for these recommendations and answered the patient's questions.  She agrees to proceed.  For tonight, she will be at bedrest with continuous monitoring.  Pad count will be followed.  Laboratory evaluation will be obtained and hemoglobin will be repeated.    Lincoln Forrest MD  2024

## 2024-08-04 NOTE — PLAN OF CARE
Goal Outcome Evaluation:   Bleeding will be closely monitored and will not increase      Problem: Adult Inpatient Plan of Care  Goal: Plan of Care Review  Outcome: Ongoing, Progressing  Goal: Patient-Specific Goal (Individualized)  Outcome: Ongoing, Progressing  Goal: Absence of Hospital-Acquired Illness or Injury  Outcome: Ongoing, Progressing  Goal: Optimal Comfort and Wellbeing  Outcome: Ongoing, Progressing  Intervention: Provide Person-Centered Care  Recent Flowsheet Documentation  Taken 8/4/2024 0400 by Graciela Ferrer, RN  Trust Relationship/Rapport:   care explained   choices provided   emotional support provided   empathic listening provided   questions answered   questions encouraged   reassurance provided   thoughts/feelings acknowledged  Goal: Readiness for Transition of Care  Outcome: Ongoing, Progressing

## 2024-08-05 ENCOUNTER — APPOINTMENT (OUTPATIENT)
Dept: ULTRASOUND IMAGING | Facility: HOSPITAL | Age: 34
End: 2024-08-05
Payer: MEDICAID

## 2024-08-05 PROBLEM — O09.523 MULTIGRAVIDA OF ADVANCED MATERNAL AGE IN THIRD TRIMESTER: Status: ACTIVE | Noted: 2024-08-05

## 2024-08-05 LAB
ALBUMIN SERPL-MCNC: 3 G/DL (ref 3.5–5.2)
ALBUMIN/GLOB SERPL: 1.4 G/DL
ALP SERPL-CCNC: 102 U/L (ref 39–117)
ALT SERPL W P-5'-P-CCNC: 7 U/L (ref 1–33)
ANION GAP SERPL CALCULATED.3IONS-SCNC: 9.6 MMOL/L (ref 5–15)
AST SERPL-CCNC: 10 U/L (ref 1–32)
BASOPHILS # BLD AUTO: 0.02 10*3/MM3 (ref 0–0.2)
BASOPHILS NFR BLD AUTO: 0.2 % (ref 0–1.5)
BILIRUB SERPL-MCNC: <0.2 MG/DL (ref 0–1.2)
BUN SERPL-MCNC: <2 MG/DL (ref 6–20)
BUN/CREAT SERPL: ABNORMAL
CALCIUM SPEC-SCNC: 8.8 MG/DL (ref 8.6–10.5)
CHLORIDE SERPL-SCNC: 109 MMOL/L (ref 98–107)
CO2 SERPL-SCNC: 19.4 MMOL/L (ref 22–29)
CREAT SERPL-MCNC: 0.29 MG/DL (ref 0.57–1)
DEPRECATED RDW RBC AUTO: 39.8 FL (ref 37–54)
EGFRCR SERPLBLD CKD-EPI 2021: 142.3 ML/MIN/1.73
EOSINOPHIL # BLD AUTO: 0.01 10*3/MM3 (ref 0–0.4)
EOSINOPHIL NFR BLD AUTO: 0.1 % (ref 0.3–6.2)
ERYTHROCYTE [DISTWIDTH] IN BLOOD BY AUTOMATED COUNT: 11.8 % (ref 12.3–15.4)
GLOBULIN UR ELPH-MCNC: 2.2 GM/DL
GLUCOSE SERPL-MCNC: 122 MG/DL (ref 65–99)
HCT VFR BLD AUTO: 32.8 % (ref 34–46.6)
HGB BLD-MCNC: 10.7 G/DL (ref 12–15.9)
IMM GRANULOCYTES # BLD AUTO: 0.11 10*3/MM3 (ref 0–0.05)
IMM GRANULOCYTES NFR BLD AUTO: 0.9 % (ref 0–0.5)
LYMPHOCYTES # BLD AUTO: 2.04 10*3/MM3 (ref 0.7–3.1)
LYMPHOCYTES NFR BLD AUTO: 15.9 % (ref 19.6–45.3)
MCH RBC QN AUTO: 30.1 PG (ref 26.6–33)
MCHC RBC AUTO-ENTMCNC: 32.6 G/DL (ref 31.5–35.7)
MCV RBC AUTO: 92.1 FL (ref 79–97)
MONOCYTES # BLD AUTO: 0.66 10*3/MM3 (ref 0.1–0.9)
MONOCYTES NFR BLD AUTO: 5.2 % (ref 5–12)
NEUTROPHILS NFR BLD AUTO: 77.7 % (ref 42.7–76)
NEUTROPHILS NFR BLD AUTO: 9.97 10*3/MM3 (ref 1.7–7)
NRBC BLD AUTO-RTO: 0 /100 WBC (ref 0–0.2)
PLATELET # BLD AUTO: 208 10*3/MM3 (ref 140–450)
PMV BLD AUTO: 10.2 FL (ref 6–12)
POTASSIUM SERPL-SCNC: 3.9 MMOL/L (ref 3.5–5.2)
PROT SERPL-MCNC: 5.2 G/DL (ref 6–8.5)
RBC # BLD AUTO: 3.56 10*6/MM3 (ref 3.77–5.28)
SODIUM SERPL-SCNC: 138 MMOL/L (ref 136–145)
WBC NRBC COR # BLD AUTO: 12.81 10*3/MM3 (ref 3.4–10.8)

## 2024-08-05 PROCEDURE — 59025 FETAL NON-STRESS TEST: CPT | Performed by: OBSTETRICS & GYNECOLOGY

## 2024-08-05 PROCEDURE — 76811 OB US DETAILED SNGL FETUS: CPT

## 2024-08-05 PROCEDURE — 76817 TRANSVAGINAL US OBSTETRIC: CPT

## 2024-08-05 PROCEDURE — 80053 COMPREHEN METABOLIC PANEL: CPT | Performed by: STUDENT IN AN ORGANIZED HEALTH CARE EDUCATION/TRAINING PROGRAM

## 2024-08-05 PROCEDURE — 99285 EMERGENCY DEPT VISIT HI MDM: CPT | Performed by: STUDENT IN AN ORGANIZED HEALTH CARE EDUCATION/TRAINING PROGRAM

## 2024-08-05 PROCEDURE — 99232 SBSQ HOSP IP/OBS MODERATE 35: CPT | Performed by: OBSTETRICS & GYNECOLOGY

## 2024-08-05 PROCEDURE — 76819 FETAL BIOPHYS PROFIL W/O NST: CPT

## 2024-08-05 PROCEDURE — 25810000003 LACTATED RINGERS PER 1000 ML: Performed by: STUDENT IN AN ORGANIZED HEALTH CARE EDUCATION/TRAINING PROGRAM

## 2024-08-05 PROCEDURE — 25010000002 BETAMETHASONE ACET & SOD PHOS PER 4 MG: Performed by: OBSTETRICS & GYNECOLOGY

## 2024-08-05 PROCEDURE — 59025 FETAL NON-STRESS TEST: CPT

## 2024-08-05 PROCEDURE — 85025 COMPLETE CBC W/AUTO DIFF WBC: CPT | Performed by: STUDENT IN AN ORGANIZED HEALTH CARE EDUCATION/TRAINING PROGRAM

## 2024-08-05 RX ORDER — DIPHENHYDRAMINE HCL 25 MG
50 CAPSULE ORAL EVERY 6 HOURS PRN
Status: DISCONTINUED | OUTPATIENT
Start: 2024-08-04 | End: 2024-08-06 | Stop reason: HOSPADM

## 2024-08-05 RX ADMIN — BETAMETHASONE SODIUM PHOSPHATE AND BETAMETHASONE ACETATE 12 MG: 3; 3 INJECTION, SUSPENSION INTRA-ARTICULAR; INTRALESIONAL; INTRAMUSCULAR at 04:24

## 2024-08-05 RX ADMIN — SODIUM CHLORIDE, POTASSIUM CHLORIDE, SODIUM LACTATE AND CALCIUM CHLORIDE 125 ML/HR: 600; 310; 30; 20 INJECTION, SOLUTION INTRAVENOUS at 10:34

## 2024-08-05 RX ADMIN — Medication 10 ML: at 21:04

## 2024-08-05 RX ADMIN — SODIUM CHLORIDE, POTASSIUM CHLORIDE, SODIUM LACTATE AND CALCIUM CHLORIDE 125 ML/HR: 600; 310; 30; 20 INJECTION, SOLUTION INTRAVENOUS at 03:00

## 2024-08-05 NOTE — PLAN OF CARE
Goal Outcome Evaluation:              Outcome Evaluation: VSS. Patient denies vaginal bleeding for entirety of this shift. No crampiness or contractions noted. Patient received second dose of BMZ around 0430. Patient scheduled for ultrasound today.

## 2024-08-05 NOTE — PLAN OF CARE
Goal Outcome Evaluation:           Progress: improving  Outcome Evaluation: VSS. Pt last had spotting yesterday 8/4 around 1630 and none since. No additional s/s of labor noted. Pt had US today with results still pending. Pt was transferred to Ante[facility] and was able to come off of CEFM, IVF and was able to have a W/C ride with family. Pt hopes to go home as soon as possible. Pending [facility] approval and plan, Dr. Boateng states he anticipates pt to stay for an additional 24 hours of being without bleeding before approving pt to be d/c home.

## 2024-08-05 NOTE — PROCEDURES
Procedure: Nonstress test    Interpreting physician: Dr. Boateng    Gestational age: 29 weeks and 5 days    Diagnosis:  Third trimester bleeding  Previous  x 3  Complete placenta previa  Advanced maternal age    Findings:  NST: 125/reactive/moderate variability/no decelerations  Tocometry: No contractions    Patient was monitored for a minimum of 20 minutes.

## 2024-08-05 NOTE — CONSULTS
Maternal Fetal Medicine Initial Consultation    Chief Complaint:     History of Present Illness:     Ms. Christiansen is a 34 y.o. year old  at 29w5d.  She is being consulted by the [facility] Maternal Fetal Medicine Department due to her history of vaginal bleeding in the setting of a complete placenta previa.  The patient was admitted to labor and delivery on the fourth at 29 weeks and 4 days with chief complaint of vaginal bleeding.  She had a known placenta previa at the time and   further imaging today confirmed presence of a complete placenta previa.  She reported bright red bleeding started around 1130 when she used the restroom followed by the passing of small clots.  She has a pregnancy complicated by history of 3 prior  sections.  No evidence of placenta accreta is noted on prior imaging studies.  Overnight the patient reports that she has not had any vaginal bleeding.  Her last episode of vaginal bleeding was scant spotting at around 4:30 PM.    Medications  No current facility-administered medications on file prior to encounter.     Current Outpatient Medications on File Prior to Encounter   Medication Sig Dispense Refill    Prenatal Vit-Fe Fumarate-FA (prenatal vitamin 27-0.8) 27-0.8 MG tablet tablet Take 1 tablet by mouth Daily. 90 tablet 1    Symbicort 80-4.5 MCG/ACT inhaler INHALE 2 PUFFS BY MOUTH TWICE DAILY 10.2 g 1        Allergies  Allergies   Allergen Reactions    Lisinopril Other (See Comments)     shakes    Sulfamethoxazole-Trimethoprim Other (See Comments)     Migraine    Morphine Rash     Red and itching         Obstetric History  OB History          5    Para   3    Term   1            AB   1    Living   3         SAB   1    IAB        Ectopic        Molar        Multiple   0    Live Births   3                 Gyn History    Past Medical History  Past Medical History:   Diagnosis Date    Allergy to ACE inhibitors 2016    Allergy to morphine  2016    Allergy to NSAIDs 2016    Asthma     Hypertension 2023        Past Surgical History  Past Surgical History:   Procedure Laterality Date    BREAST AUGMENTATION       SECTION       SECTION N/A 2022    Procedure:  SECTION REPEAT;  Surgeon: Jeffery Cooper MD;  Location: [facility] LABOR DELIVERY;  Service: Obstetrics/Gynecology;  Laterality: N/A;     SECTION      COSMETIC SURGERY  2006        Family History  Family History   Problem Relation Age of Onset    Hypertension Mother     Dementia Mother     Stroke Mother 65    Cancer Mother         Lung    Heart disease Mother     Hyperlipidemia Mother     Diabetes Father     Hypertension Father     Hyperlipidemia Father     Stroke Father     Hyperlipidemia Sister         Objective  Vitals  Vitals:    24 1430 24 1941 24 2337 24 0847   BP: 116/69 113/73 110/71 112/68   BP Location:  Left arm  Left arm   Patient Position:  Sitting  Sitting   Pulse: 86 84 87 85   Resp:  16  16   Temp:  98.1 °F (36.7 °C)  97.7 °F (36.5 °C)   TempSrc:  Oral  Axillary   SpO2:    99%   Weight:       Height:            Physical Examination  Physical Exam  Constitutional:       Appearance: Normal appearance.   Cardiovascular:      Rate and Rhythm: Normal rate and regular rhythm.   Pulmonary:      Effort: Pulmonary effort is normal.      Breath sounds: Normal breath sounds.   Abdominal:      General: Abdomen is flat.      Palpations: Abdomen is soft.   Musculoskeletal:         General: Normal range of motion.      Cervical back: Normal range of motion.   Neurological:      General: No focal deficit present.      Mental Status: She is alert.   Skin:     General: Skin is warm and dry.   Psychiatric:         Mood and Affect: Mood normal.         Behavior: Behavior normal.   Vitals reviewed.        As a separate episode of care I interpreted the fetal heart rate tracing:  Baseline: 150's  Variability:  moderate  Accelerations; present  Decelerations; absent  Category: 1  Labs  WBC   Date Value Ref Range Status   08/05/2024 12.81 (H) 3.40 - 10.80 10*3/mm3 Final     RBC   Date Value Ref Range Status   08/05/2024 3.56 (L) 3.77 - 5.28 10*6/mm3 Final     Hemoglobin   Date Value Ref Range Status   08/05/2024 10.7 (L) 12.0 - 15.9 g/dL Final     Hematocrit   Date Value Ref Range Status   08/05/2024 32.8 (L) 34.0 - 46.6 % Final     MCV   Date Value Ref Range Status   08/05/2024 92.1 79.0 - 97.0 fL Final     MCH   Date Value Ref Range Status   08/05/2024 30.1 26.6 - 33.0 pg Final     MCHC   Date Value Ref Range Status   08/05/2024 32.6 31.5 - 35.7 g/dL Final     RDW   Date Value Ref Range Status   08/05/2024 11.8 (L) 12.3 - 15.4 % Final     RDW-SD   Date Value Ref Range Status   08/05/2024 39.8 37.0 - 54.0 fl Final     MPV   Date Value Ref Range Status   08/05/2024 10.2 6.0 - 12.0 fL Final     Platelets   Date Value Ref Range Status   08/05/2024 208 140 - 450 10*3/mm3 Final     Neutrophil %   Date Value Ref Range Status   08/05/2024 77.7 (H) 42.7 - 76.0 % Final     Lymphocyte %   Date Value Ref Range Status   08/05/2024 15.9 (L) 19.6 - 45.3 % Final     Monocyte %   Date Value Ref Range Status   08/05/2024 5.2 5.0 - 12.0 % Final     Eosinophil %   Date Value Ref Range Status   08/05/2024 0.1 (L) 0.3 - 6.2 % Final     Basophil %   Date Value Ref Range Status   08/05/2024 0.2 0.0 - 1.5 % Final     Immature Grans %   Date Value Ref Range Status   08/05/2024 0.9 (H) 0.0 - 0.5 % Final     Neutrophils, Absolute   Date Value Ref Range Status   08/05/2024 9.97 (H) 1.70 - 7.00 10*3/mm3 Final     Lymphocytes, Absolute   Date Value Ref Range Status   08/05/2024 2.04 0.70 - 3.10 10*3/mm3 Final     Monocytes, Absolute   Date Value Ref Range Status   08/05/2024 0.66 0.10 - 0.90 10*3/mm3 Final     Eosinophils, Absolute   Date Value Ref Range Status   08/05/2024 0.01 0.00 - 0.40 10*3/mm3 Final     Basophils, Absolute   Date Value Ref Range  Status   08/05/2024 0.02 0.00 - 0.20 10*3/mm3 Final     Immature Grans, Absolute   Date Value Ref Range Status   08/05/2024 0.11 (H) 0.00 - 0.05 10*3/mm3 Final     nRBC   Date Value Ref Range Status   08/05/2024 0.0 0.0 - 0.2 /100 WBC Final           Lab 08/05/24  0422 08/04/24  1019 08/04/24  0402 08/04/24  0304   HEMOGLOBIN 10.7* 11.5*  --  11.8*   HEMATOCRIT 32.8* 34.9  --  35.6   PLATELETS 208 240  --  250   PROTIME  --   --  13.7  --    APTT  --   --  25.1  --    CREATININE 0.29*  --   --  0.42*   EGFR 142.3  --   --  130.2       Assessment and Plan  Vaginal bleeding in the setting of placenta previa  -Complete posterior placenta previa noted on ultrasound evaluation today.  Views were not adequate in ruling out the presence or absence of placenta accreta spectrum disease.  Recommend follow-up scan with close evaluation of the placenta at that time.  -Due to the confirmed presence of a complete placenta previa recommend continued inpatient management until no bleeding is occurred for a minimum of 48 to 72 hours  -Not a candidate for rescue betamethasone at current time  -BPP score 6 out of 8 recommend weekly biophysical profiles until delivery.  Repeat imaging of placenta at 34 weeks to confirm the presence or absence of a placenta previa at that time.  - DAVID scan in AM to rule out PAS disease  - Formal MRI to be ordered given history to evaluate placenta    Greg Montes De Oca MD FACOG   Maternal Fetal Medicine    [facility]

## 2024-08-05 NOTE — PROGRESS NOTES
[facility]     Progress Note    Patient Name: Isabella Christiansen  : 1990  MRN: [id number]  Primary Care Physician:  Aleena Garcia, GINETTE  Date of admission: 2024    Subjective   Subjective       HPI:  Patient Reports no bleeding at all today.  Had some scant spotting/small clot yesterday around 4:30 PM.  She denies abdominal pain or contractions.  She says she feels well.  She reports that she would really like to be able to go outside briefly if possible.      Objective   Objective     Vitals:   Temp:  [97.7 °F (36.5 °C)-98.1 °F (36.7 °C)] 97.7 °F (36.5 °C)  Heart Rate:  [84-87] 85  Resp:  [16] 16  BP: (110-116)/(68-73) 112/68  Physical Exam     Gen: No acute distress, awake and oriented times three  Abdomen: Gravid, soft, nontender  Pelvic exam deferred due to previous  Rectal: Deferred  Psych: Good judgement and insight, normal affect and mood   Neurological cranial nerves II through XII intact, no gross deficits    Result Review    Result Review:  I have personally reviewed the results from the time of this admission to 2024 11:27 EDT and agree with these findings:  [x]  Laboratory list / accordion  []  Microbiology  [x]  Radiology  []  EKG/Telemetry   []  Cardiology/Vascular   []  Pathology  []  Old records  []  Other:  Most notable findings include:     Admission on 2024   Component Date Value Ref Range Status    Color, UA 2024 Yellow  Yellow, Straw Final    Appearance, UA 2024 Clear  Clear Final    pH, UA 2024 7.0  5.0 - 8.0 Final    Specific Gravity, UA 2024 1.010  1.005 - 1.030 Final    Glucose, UA 2024 Negative  Negative Final    Ketones, UA 2024 Trace (A)  Negative Final    Bilirubin, UA 2024 Negative  Negative Final    Blood, UA 2024 Moderate (2+) (A)  Negative Final    Protein, UA 2024 Negative  Negative Final    Leuk Esterase, UA 2024 Negative  Negative Final    Nitrite, UA 2024 Negative  Negative Final     Urobilinogen, UA 08/04/2024 1.0 E.U./dL  0.2 - 1.0 E.U./dL Final    ABO Type 08/04/2024 AB   Final    RH type 08/04/2024 Positive   Final    Antibody Screen 08/04/2024 Negative   Final    T&S Expiration Date 08/04/2024 8/7/2024 11:59:59 PM   Final    Fibrinogen 08/04/2024 394  219 - 464 mg/dL Final    WBC 08/04/2024 18.00 (H)  3.40 - 10.80 10*3/mm3 Final    RBC 08/04/2024 3.89  3.77 - 5.28 10*6/mm3 Final    Hemoglobin 08/04/2024 11.8 (L)  12.0 - 15.9 g/dL Final    Hematocrit 08/04/2024 35.6  34.0 - 46.6 % Final    MCV 08/04/2024 91.5  79.0 - 97.0 fL Final    MCH 08/04/2024 30.3  26.6 - 33.0 pg Final    MCHC 08/04/2024 33.1  31.5 - 35.7 g/dL Final    RDW 08/04/2024 12.0 (L)  12.3 - 15.4 % Final    RDW-SD 08/04/2024 40.3  37.0 - 54.0 fl Final    MPV 08/04/2024 10.1  6.0 - 12.0 fL Final    Platelets 08/04/2024 250  140 - 450 10*3/mm3 Final    Neutrophil % 08/04/2024 72.6  42.7 - 76.0 % Final    Lymphocyte % 08/04/2024 19.0 (L)  19.6 - 45.3 % Final    Monocyte % 08/04/2024 5.7  5.0 - 12.0 % Final    Eosinophil % 08/04/2024 1.9  0.3 - 6.2 % Final    Basophil % 08/04/2024 0.2  0.0 - 1.5 % Final    Immature Grans % 08/04/2024 0.6 (H)  0.0 - 0.5 % Final    Neutrophils, Absolute 08/04/2024 13.06 (H)  1.70 - 7.00 10*3/mm3 Final    Lymphocytes, Absolute 08/04/2024 3.42 (H)  0.70 - 3.10 10*3/mm3 Final    Monocytes, Absolute 08/04/2024 1.02 (H)  0.10 - 0.90 10*3/mm3 Final    Eosinophils, Absolute 08/04/2024 0.35  0.00 - 0.40 10*3/mm3 Final    Basophils, Absolute 08/04/2024 0.04  0.00 - 0.20 10*3/mm3 Final    Immature Grans, Absolute 08/04/2024 0.11 (H)  0.00 - 0.05 10*3/mm3 Final    nRBC 08/04/2024 0.0  0.0 - 0.2 /100 WBC Final    Treponemal AB Total 08/04/2024 Non-Reactive  Non-Reactive Final    Glucose 08/04/2024 100 (H)  65 - 99 mg/dL Final    BUN 08/04/2024 2 (L)  6 - 20 mg/dL Final    Creatinine 08/04/2024 0.42 (L)  0.57 - 1.00 mg/dL Final    Sodium 08/04/2024 137  136 - 145 mmol/L Final    Potassium 08/04/2024 3.2 (L)  3.5  - 5.2 mmol/L Final    Chloride 08/04/2024 106  98 - 107 mmol/L Final    CO2 08/04/2024 21.0 (L)  22.0 - 29.0 mmol/L Final    Calcium 08/04/2024 8.4 (L)  8.6 - 10.5 mg/dL Final    Total Protein 08/04/2024 5.8 (L)  6.0 - 8.5 g/dL Final    Albumin 08/04/2024 3.4 (L)  3.5 - 5.2 g/dL Final    ALT (SGPT) 08/04/2024 10  1 - 33 U/L Final    AST (SGOT) 08/04/2024 9  1 - 32 U/L Final    Alkaline Phosphatase 08/04/2024 127 (H)  39 - 117 U/L Final    Total Bilirubin 08/04/2024 <0.2  0.0 - 1.2 mg/dL Final    Globulin 08/04/2024 2.4  gm/dL Final    A/G Ratio 08/04/2024 1.4  g/dL Final    BUN/Creatinine Ratio 08/04/2024 4.8 (L)  7.0 - 25.0 Final    Anion Gap 08/04/2024 10.0  5.0 - 15.0 mmol/L Final    eGFR 08/04/2024 130.2  >60.0 mL/min/1.73 Final    Protime 08/04/2024 13.7  11.7 - 14.2 Seconds Final    INR 08/04/2024 1.03  0.90 - 1.10 Final    PTT 08/04/2024 25.1  22.7 - 35.4 seconds Final    WBC 08/04/2024 11.53 (H)  3.40 - 10.80 10*3/mm3 Final    RBC 08/04/2024 3.83  3.77 - 5.28 10*6/mm3 Final    Hemoglobin 08/04/2024 11.5 (L)  12.0 - 15.9 g/dL Final    Hematocrit 08/04/2024 34.9  34.0 - 46.6 % Final    MCV 08/04/2024 91.1  79.0 - 97.0 fL Final    MCH 08/04/2024 30.0  26.6 - 33.0 pg Final    MCHC 08/04/2024 33.0  31.5 - 35.7 g/dL Final    RDW 08/04/2024 12.0 (L)  12.3 - 15.4 % Final    RDW-SD 08/04/2024 40.3  37.0 - 54.0 fl Final    MPV 08/04/2024 10.2  6.0 - 12.0 fL Final    Platelets 08/04/2024 240  140 - 450 10*3/mm3 Final    RBC, UA 08/04/2024 21-50 (A)  None Seen, 0-2 /HPF Final    WBC, UA 08/04/2024 0-2  None Seen, 0-2 /HPF Final    Bacteria, UA 08/04/2024 None Seen  None Seen /HPF Final    Squamous Epithelial Cells, UA 08/04/2024 0-2  None Seen, 0-2 /HPF Final    Hyaline Casts, UA 08/04/2024 None Seen  None Seen /LPF Final    Methodology 08/04/2024 Automated Microscopy   Final    Glucose 08/05/2024 122 (H)  65 - 99 mg/dL Final    BUN 08/05/2024 <2 (L)  6 - 20 mg/dL Final    Creatinine 08/05/2024 0.29 (L)  0.57 - 1.00  mg/dL Final    Sodium 08/05/2024 138  136 - 145 mmol/L Final    Potassium 08/05/2024 3.9  3.5 - 5.2 mmol/L Final    Chloride 08/05/2024 109 (H)  98 - 107 mmol/L Final    CO2 08/05/2024 19.4 (L)  22.0 - 29.0 mmol/L Final    Calcium 08/05/2024 8.8  8.6 - 10.5 mg/dL Final    Total Protein 08/05/2024 5.2 (L)  6.0 - 8.5 g/dL Final    Albumin 08/05/2024 3.0 (L)  3.5 - 5.2 g/dL Final    ALT (SGPT) 08/05/2024 7  1 - 33 U/L Final    AST (SGOT) 08/05/2024 10  1 - 32 U/L Final    Alkaline Phosphatase 08/05/2024 102  39 - 117 U/L Final    Total Bilirubin 08/05/2024 <0.2  0.0 - 1.2 mg/dL Final    Globulin 08/05/2024 2.2  gm/dL Final    A/G Ratio 08/05/2024 1.4  g/dL Final    BUN/Creatinine Ratio 08/05/2024    Final    Unable to calculate Bun/Crea Ratio.    Anion Gap 08/05/2024 9.6  5.0 - 15.0 mmol/L Final    eGFR 08/05/2024 142.3  >60.0 mL/min/1.73 Final    WBC 08/05/2024 12.81 (H)  3.40 - 10.80 10*3/mm3 Final    RBC 08/05/2024 3.56 (L)  3.77 - 5.28 10*6/mm3 Final    Hemoglobin 08/05/2024 10.7 (L)  12.0 - 15.9 g/dL Final    Hematocrit 08/05/2024 32.8 (L)  34.0 - 46.6 % Final    MCV 08/05/2024 92.1  79.0 - 97.0 fL Final    MCH 08/05/2024 30.1  26.6 - 33.0 pg Final    MCHC 08/05/2024 32.6  31.5 - 35.7 g/dL Final    RDW 08/05/2024 11.8 (L)  12.3 - 15.4 % Final    RDW-SD 08/05/2024 39.8  37.0 - 54.0 fl Final    MPV 08/05/2024 10.2  6.0 - 12.0 fL Final    Platelets 08/05/2024 208  140 - 450 10*3/mm3 Final    Neutrophil % 08/05/2024 77.7 (H)  42.7 - 76.0 % Final    Lymphocyte % 08/05/2024 15.9 (L)  19.6 - 45.3 % Final    Monocyte % 08/05/2024 5.2  5.0 - 12.0 % Final    Eosinophil % 08/05/2024 0.1 (L)  0.3 - 6.2 % Final    Basophil % 08/05/2024 0.2  0.0 - 1.5 % Final    Immature Grans % 08/05/2024 0.9 (H)  0.0 - 0.5 % Final    Neutrophils, Absolute 08/05/2024 9.97 (H)  1.70 - 7.00 10*3/mm3 Final    Lymphocytes, Absolute 08/05/2024 2.04  0.70 - 3.10 10*3/mm3 Final    Monocytes, Absolute 08/05/2024 0.66  0.10 - 0.90 10*3/mm3 Final     Eosinophils, Absolute 2024 0.01  0.00 - 0.40 10*3/mm3 Final    Basophils, Absolute 2024 0.02  0.00 - 0.20 10*3/mm3 Final    Immature Grans, Absolute 2024 0.11 (H)  0.00 - 0.05 10*3/mm3 Final    nRBC 2024 0.0  0.0 - 0.2 /100 WBC Final     NST: 125/reactive/moderate variability/no decelerations  Tocometry: No contractions    MFM ultrasound: Pending    Assessment & Plan   Assessment / Plan     Brief Patient Summary:  Isabella Christiansen is a 34 y.o. female  5 para 3-0-1-3 at 29-5/7 weeks gestational age with third trimester vaginal bleeding, resolved today  2.  Posterior placenta previa  3.  History of previous  x 3  4.  Advanced maternal age  Status post full course of steroids on  and     Active Hospital Problems:  Active Hospital Problems    Diagnosis     **Vaginal bleeding affecting early pregnancy     History of      Placenta previa in third trimester      Plan:    1.  Patient has not had any vaginal bleeding today.  Last episode of bleeding was a scant amount of spotting yesterday at about 4:30 PM.  She has received a full course of steroids.  When she has been 24 hours without bleeding we can transfer to the antepartum unit and discontinue continuous fetal monitoring in favor of 3 times daily monitoring.  Would recommend at least another 24 hours in the hospital without any bleeding prior to discharge home.  Awaiting maternal-fetal medicine recommendations.    VTE Prophylaxis:  Mechanical VTE prophylaxis orders are signed & held.      CODE STATUS: Full code     Disposition:  I expect patient to be discharged in 1-2 days.    Darrell Boateng MD

## 2024-08-06 ENCOUNTER — APPOINTMENT (OUTPATIENT)
Dept: ULTRASOUND IMAGING | Facility: HOSPITAL | Age: 34
End: 2024-08-06
Payer: MEDICAID

## 2024-08-06 ENCOUNTER — APPOINTMENT (OUTPATIENT)
Dept: MRI IMAGING | Facility: HOSPITAL | Age: 34
End: 2024-08-06
Payer: MEDICAID

## 2024-08-06 VITALS
HEART RATE: 94 BPM | WEIGHT: 165 LBS | DIASTOLIC BLOOD PRESSURE: 68 MMHG | RESPIRATION RATE: 17 BRPM | SYSTOLIC BLOOD PRESSURE: 114 MMHG | TEMPERATURE: 97.9 F | OXYGEN SATURATION: 98 % | BODY MASS INDEX: 29.23 KG/M2 | HEIGHT: 63 IN

## 2024-08-06 DIAGNOSIS — J45.30 MILD PERSISTENT ASTHMA, UNSPECIFIED WHETHER COMPLICATED: ICD-10-CM

## 2024-08-06 PROCEDURE — 99239 HOSP IP/OBS DSCHRG MGMT >30: CPT | Performed by: OBSTETRICS & GYNECOLOGY

## 2024-08-06 PROCEDURE — 59025 FETAL NON-STRESS TEST: CPT

## 2024-08-06 PROCEDURE — 76817 TRANSVAGINAL US OBSTETRIC: CPT

## 2024-08-06 PROCEDURE — 72195 MRI PELVIS W/O DYE: CPT

## 2024-08-06 PROCEDURE — 76819 FETAL BIOPHYS PROFIL W/O NST: CPT

## 2024-08-06 RX ORDER — DILTIAZEM HYDROCHLORIDE 60 MG/1
2 TABLET, FILM COATED ORAL 2 TIMES DAILY
Qty: 10.2 G | Refills: 1 | Status: SHIPPED | OUTPATIENT
Start: 2024-08-06

## 2024-08-06 RX ADMIN — Medication 10 ML: at 09:40

## 2024-08-06 NOTE — PLAN OF CARE
Goal Outcome Evaluation:              Outcome Evaluation: VSS.  RNST.  Pt reports +FM.  Denies VB for entirety of this shift.  Received 8/8 BPP today.  Pelvic MRI completed.  Denies LOF, ctx, RUQ/epigastric pain, h/a, or vision changes.  Plan for discharge home once all tests completed, with f/u in office this week.

## 2024-08-06 NOTE — PAYOR COMM NOTE
"Kirill Black \"Haley\" (34 y.o. Female)       Date of Birth   1990    Social Security Number       Address   [address] [address]    Home Phone   [phone]    MRN   [id number]       Hindu   None    Marital Status                               Admission Date   8/4/24    Admission Type   Elective    Admitting Provider   Lincoln Forrest MD    Attending Provider   Jeffery Cooper MD    Department, Room/Bed   [address], S316/1       Discharge Date       Discharge Disposition       Discharge Destination                                 Attending Provider: Jeffery Cooper MD    Allergies: Lisinopril, Sulfamethoxazole-trimethoprim, Morphine    Isolation: None   Infection: None   Code Status: Prior    Ht: 160 cm (63\")   Wt: 74.8 kg (165 lb)    Admission Cmt: None   Principal Problem: Vaginal bleeding affecting early pregnancy [O20.9]                   Active Insurance as of 8/4/2024       Primary Coverage       Payor Plan Insurance Group Employer/Plan Group    [facility] COMMUNITY PLAN [facility] COMMUNITY PLAN [facility] [place]       Payor Plan Address Payor Plan Phone Number Payor Plan Fax Number Effective Dates    [address]   7/1/2021 - None Entered    [facility] [id number]         Subscriber Name Subscriber Birth Date Member ID       KIRILL BLACK 1990 [id number]                     Emergency Contacts        (Rel.) Home Phone Work Phone Mobile Phone    BUSHRA CARMICHAEL (Sister) [phone] -- --              Insurance Information                  [facility] Phone: --    Subscriber: Kirill Black Subscriber#: [id number]    Group#: [id number] Precert#: --          Problem List             Codes Noted - Resolved       Hospital    Multigravida of advanced maternal age in third trimester ICD-10-CM: O09.523  ICD-9-CM: 659.63 8/5/2024 - Present    * (Principal) Vaginal bleeding affecting early pregnancy " ICD-10-CM: O20.9  ICD-9-CM: 640.90 2024 - Present    History of  ICD-10-CM: Z98.891  ICD-9-CM: V45.89 2024 - Present    Placenta previa in third trimester ICD-10-CM: O44.03  ICD-9-CM: 641.13 2024 - Present       Non-Hospital    Folate deficiency ICD-10-CM: E53.8  ICD-9-CM: 266.2 2023 - Present    Mild episode of recurrent major depressive disorder ICD-10-CM: F33.0  ICD-9-CM: 296.31 2023 - Present    Anxiety ICD-10-CM: F41.9  ICD-9-CM: 300.00 2023 - Present    Localized edema ICD-10-CM: R60.0  ICD-9-CM: 782.3 2023 - Present    Fatigue ICD-10-CM: R53.83  ICD-9-CM: 780.79 2023 - Present    Class 1 obesity without serious comorbidity with body mass index (BMI) of 30.0 to 30.9 in adult ICD-10-CM: E66.9, Z68.30  ICD-9-CM: 278.00, V85.30 2023 - Present    Primary hypertension ICD-10-CM: I10  ICD-9-CM: 401.9 2023 - Present    Current smoker ICD-10-CM: F17.200  ICD-9-CM: 305.1 2023 - Present    Mild persistent asthma ICD-10-CM: J45.30  ICD-9-CM: 493.90 2023 - Present    Right chronic serous otitis media ICD-10-CM: H65.21  ICD-9-CM: 381.10 2023 - Present    Arthralgia of right temporomandibular joint ICD-10-CM: M26.621  ICD-9-CM: 524.62 2023 - Present    Heart palpitations ICD-10-CM: R00.2  ICD-9-CM: 785.1 2016 - Present    Insomnia ICD-10-CM: G47.00  ICD-9-CM: 780.52 2016 - Present    Attention deficit ICD-10-CM: R41.840  ICD-9-CM: 799.51 2015 - Present    Headache, variant migraine ICD-10-CM: G43.809  ICD-9-CM: 346.20 2015 - Present    Seasonal allergies ICD-10-CM: J30.2  ICD-9-CM: 477.9 2015 - Present    Chronic low back pain ICD-10-CM: M54.50, G89.29  ICD-9-CM: 724.2, 338.29 2014 - Present    Lumbar facet arthropathy ICD-10-CM: M47.816  ICD-9-CM: 721.3 2014 - Present        History & Physical        Lincoln Forrest MD at 24 0257          H&P Note    Patient Identification:  Name: Isabella Christiansen  Age: 34  y.o.  Sex: female  :  1990  MRN: [id number]                       Chief Complaint: Vaginal bleeding    History of Present Illness:   34-year-old  5 para 3 presents at 29-4/7 weeks with an episode of vaginal bleeding.  Patient noticed moderate bleeding at home.  This slowly decreased and ultimately stopped.  The patient did not have any pain associated with her bleeding.  Denies any trauma to the abdomen or pelvis.  History is significant for a posterior complete previa which was initially diagnosed at 13 weeks and has been persistent on subsequent imaging.  Blood type is AB+.  The patient's history is further significant for 3 prior  deliveries.  Currently, she is not experiencing pain.  She reports active fetal movement.    Problem List:  [unfilled]  Past Medical History:  Past Medical History:   Diagnosis Date    Allergy to ACE inhibitors 2016    Allergy to morphine 2016    Allergy to NSAIDs 2016    Asthma     Hypertension 2023     Past Surgical History:  Past Surgical History:   Procedure Laterality Date    BREAST AUGMENTATION       SECTION       SECTION N/A 2022    Procedure:  SECTION REPEAT;  Surgeon: Jeffery Cooper MD;  Location: [facility] LABOR DELIVERY;  Service: Obstetrics/Gynecology;  Laterality: N/A;     SECTION      COSMETIC SURGERY  2006      Home Meds:  Medications Prior to Admission   Medication Sig Dispense Refill Last Dose    Prenatal Vit-Fe Fumarate-FA (prenatal vitamin 27-0.8) 27-0.8 MG tablet tablet Take 1 tablet by mouth Daily. 90 tablet 1 8/3/2024    Symbicort 80-4.5 MCG/ACT inhaler INHALE 2 PUFFS BY MOUTH TWICE DAILY 10.2 g 1 2024 at 0100     Current Meds:   [unfilled]  Allergies:  Allergies   Allergen Reactions    Lisinopril Other (See Comments)     shakes    Sulfamethoxazole-Trimethoprim Other (See Comments)     Migraine    Morphine Rash     Red and itching      Immunizations:    There is no  immunization history on file for this patient.  Social History:   Social History     Tobacco Use    Smoking status: Every Day     Current packs/day: 1.00     Average packs/day: 1 pack/day for 4.0 years (4.0 ttl pk-yrs)     Types: Cigarettes    Smokeless tobacco: Never    Tobacco comments:     Previously smoking about 1 pack per day for about 5 years, currently smoking less than half pack daily; back to smoking a pack per day   Substance Use Topics    Alcohol use: Not Currently      Family History:  Family History   Problem Relation Age of Onset    Hypertension Mother     Dementia Mother     Stroke Mother 65    Cancer Mother         Lung    Heart disease Mother     Hyperlipidemia Mother     Diabetes Father     Hypertension Father     Hyperlipidemia Father     Stroke Father     Hyperlipidemia Sister         Review of Systems  Positive for vaginal bleeding.  Negative for abdominal or pelvic pain.  Negative for nausea or vomiting.  Negative for fever or chills.  All other systems are reviewed and are negative.    Objective:  tMax 24 hrs: Temp (24hrs), Av.3 °F (36.8 °C), Min:98.3 °F (36.8 °C), Max:98.3 °F (36.8 °C)    Vitals Ranges:   Temp:  [98.3 °F (36.8 °C)] 98.3 °F (36.8 °C)  Heart Rate:  [104] 104  BP: (146)/(90) 146/90  Intake and Output Last 3 Shifts:   No intake/output data recorded.    Exam:     General Appearance:    Alert, cooperative, no distress, appears stated age   Head:    Normocephalic, without obvious abnormality, atraumatic   Back:     Symmetric, no curvature, ROM normal, no CVA tenderness   Lungs:     Clear to auscultation bilaterally, respirations unlabored   Chest Wall:    No tenderness or deformity    Heart:    Regular rate and rhythm, S1 and S2 normal, no murmur, rub   or gallop       Abdomen:   Soft, gravid.  There is no epigastric tenderness.  No right upper quadrant tenderness.  No fundal tenderness.  No suprapubic tenderness.   Genitalia:  Normal female external genitalia.  There is a  small amount of old blood on the patient's pad.  There is no active vaginal bleeding.     Rectal:  Not examined today   Extremities: Equal in size   Skin:   Skin color, texture, turgor normal, no rashes or lesions   Lymph nodes:   Cervical, supraclavicular, and axillary nodes normal       Data Review:  Fetal heart tones are reactive.  [place] is quiet.   Lab Results (last 24 hours)       ** No results found for the last 24 hours. **          Assessment:    Vaginal bleeding affecting early pregnancy    History of     Placenta previa in third trimester      1.  Intrauterine pregnancy at 29-4/7 weeks  2.  Complete previa  3.  Episode of vaginal bleeding, now resolved.    Plan:  I counseled the patient regarding the pathophysiology of this condition and answered her questions.  We also discussed the risk that this could be a sentinel bleed with an additional bleed forthcoming.  We discussed my recommendations for bed rest in the hospital and continuous monitoring overnight.  Also, betamethasone in case  delivery is required.  We discussed my rationale for these recommendations and answered the patient's questions.  She agrees to proceed.  For tonight, she will be at bedrest with continuous monitoring.  Pad count will be followed.  Laboratory evaluation will be obtained and hemoglobin will be repeated.    Lincoln Forrest MD  2024     Electronically signed by Lincoln Forrest MD at 24 0302       Facility-Administered Medications as of 2024   Medication Dose Route Frequency Provider Last Rate Last Admin    [COMPLETED] betamethasone acetate-betamethasone sodium phosphate (CELESTONE SOLUSPAN) injection 12 mg  12 mg Intramuscular Q24H Lincoln Forrest MD   12 mg at 24 0424    diphenhydrAMINE (BENADRYL) capsule 50 mg  50 mg Oral Q6H PRN Eloisa Yuen MD        docusate sodium (COLACE) capsule 100 mg  100 mg Oral BID PRN Greg Montes De Oca MD        famotidine (PEPCID) tablet 20 mg  20 mg Oral BID  PRN Eloisa Yuen MD        ondansetron ODT (ZOFRAN-ODT) disintegrating tablet 8 mg  8 mg Oral Q8H PRN Lincoln Forrest MD        Or    ondansetron (ZOFRAN) injection 4 mg  4 mg Intravenous Q8H PRN Lincoln Forrest MD        [COMPLETED] potassium chloride (K-DUR,KLOR-CON) ER tablet 40 mEq  40 mEq Oral Q4H Eloisa Yuen MD   40 mEq at 08/04/24 1941    Potassium Replacement - Follow Nurse / BPA Driven Protocol   Does not apply PREloisa Tilley MD        sodium chloride 0.9 % flush 10 mL  10 mL Intravenous Q12H Greg Montes De Oca MD   10 mL at 08/05/24 2104    sodium chloride 0.9 % flush 10 mL  10 mL Intravenous PRN Greg Montes De Oca MD         Lab Results (last 72 hours)       Procedure Component Value Units Date/Time    Comprehensive Metabolic Panel [[id number]]  (Abnormal) Collected: 08/05/24 0422    Specimen: Blood Updated: 08/05/24 0457     Glucose 122 mg/dL      BUN <2 mg/dL      Creatinine 0.29 mg/dL      Sodium 138 mmol/L      Potassium 3.9 mmol/L      Chloride 109 mmol/L      CO2 19.4 mmol/L      Calcium 8.8 mg/dL      Total Protein 5.2 g/dL      Albumin 3.0 g/dL      ALT (SGPT) 7 U/L      AST (SGOT) 10 U/L      Alkaline Phosphatase 102 U/L      Total Bilirubin <0.2 mg/dL      Globulin 2.2 gm/dL      A/G Ratio 1.4 g/dL      BUN/Creatinine Ratio --     Comment: Unable to calculate Bun/Crea Ratio.        Anion Gap 9.6 mmol/L      eGFR 142.3 mL/min/1.73     Narrative:      GFR Normal >60  Chronic Kidney Disease <60  Kidney Failure <15      CBC & Differential [[id number]]  (Abnormal) Collected: 08/05/24 0422    Specimen: Blood Updated: 08/05/24 0435    Narrative:      The following orders were created for panel order CBC & Differential.  Procedure                               Abnormality         Status                     ---------                               -----------         ------                     CBC Auto Differential[[id number]]        Abnormal            Final result                 Please view  results for these tests on the individual orders.    CBC Auto Differential [[id number]]  (Abnormal) Collected: 08/05/24 0422    Specimen: Blood Updated: 08/05/24 0435     WBC 12.81 10*3/mm3      RBC 3.56 10*6/mm3      Hemoglobin 10.7 g/dL      Hematocrit 32.8 %      MCV 92.1 fL      MCH 30.1 pg      MCHC 32.6 g/dL      RDW 11.8 %      RDW-SD 39.8 fl      MPV 10.2 fL      Platelets 208 10*3/mm3      Neutrophil % 77.7 %      Lymphocyte % 15.9 %      Monocyte % 5.2 %      Eosinophil % 0.1 %      Basophil % 0.2 %      Immature Grans % 0.9 %      Neutrophils, Absolute 9.97 10*3/mm3      Lymphocytes, Absolute 2.04 10*3/mm3      Monocytes, Absolute 0.66 10*3/mm3      Eosinophils, Absolute 0.01 10*3/mm3      Basophils, Absolute 0.02 10*3/mm3      Immature Grans, Absolute 0.11 10*3/mm3      nRBC 0.0 /100 WBC     CBC (No Diff) [[id number]]  (Abnormal) Collected: 08/04/24 1019    Specimen: Blood Updated: 08/04/24 1030     WBC 11.53 10*3/mm3      RBC 3.83 10*6/mm3      Hemoglobin 11.5 g/dL      Hematocrit 34.9 %      MCV 91.1 fL      MCH 30.0 pg      MCHC 33.0 g/dL      RDW 12.0 %      RDW-SD 40.3 fl      MPV 10.2 fL      Platelets 240 10*3/mm3     Urinalysis With Microscopic If Indicated (No Culture) - Urine, Clean Catch [[id number]]  (Abnormal) Collected: 08/04/24 0558    Specimen: Urine, Clean Catch Updated: 08/04/24 0802     Color, UA Yellow     Appearance, UA Clear     pH, UA 7.0     Specific Gravity, UA 1.010     Glucose, UA Negative     Ketones, UA Trace     Bilirubin, UA Negative     Blood, UA Moderate (2+)     Protein, UA Negative     Leuk Esterase, UA Negative     Nitrite, UA Negative     Urobilinogen, UA 1.0 E.U./dL    Urinalysis, Microscopic Only - Urine, Clean Catch [[id number]]  (Abnormal) Collected: 08/04/24 0558    Specimen: Urine, Clean Catch Updated: 08/04/24 0802     RBC, UA 21-50 /HPF      WBC, UA 0-2 /HPF      Bacteria, UA None Seen /HPF      Squamous Epithelial Cells, UA 0-2 /HPF      Hyaline Casts, UA None  Seen /LPF      Methodology Automated Microscopy    Fibrinogen [[id number]]  (Normal) Collected: 08/04/24 0402    Specimen: Blood Updated: 08/04/24 0435     Fibrinogen 394 mg/dL     Protime-INR [[id number]]  (Normal) Collected: 08/04/24 0402    Specimen: Blood Updated: 08/04/24 0435     Protime 13.7 Seconds      INR 1.03    aPTT [[id number]]  (Normal) Collected: 08/04/24 0402    Specimen: Blood Updated: 08/04/24 0435     PTT 25.1 seconds     Comprehensive Metabolic Panel [[id number]]  (Abnormal) Collected: 08/04/24 0304    Specimen: Blood Updated: 08/04/24 0350     Glucose 100 mg/dL      BUN 2 mg/dL      Creatinine 0.42 mg/dL      Sodium 137 mmol/L      Potassium 3.2 mmol/L      Chloride 106 mmol/L      CO2 21.0 mmol/L      Calcium 8.4 mg/dL      Total Protein 5.8 g/dL      Albumin 3.4 g/dL      ALT (SGPT) 10 U/L      AST (SGOT) 9 U/L      Alkaline Phosphatase 127 U/L      Total Bilirubin <0.2 mg/dL      Globulin 2.4 gm/dL      A/G Ratio 1.4 g/dL      BUN/Creatinine Ratio 4.8     Anion Gap 10.0 mmol/L      eGFR 130.2 mL/min/1.73     Narrative:      GFR Normal >60  Chronic Kidney Disease <60  Kidney Failure <15      Treponema pallidum AB w/Reflex RPR [[id number]]  (Normal) Collected: 08/04/24 0304    Specimen: Blood Updated: 08/04/24 0347     Treponemal AB Total Non-Reactive    Narrative:      Reactive results will reflex RPR testing.    CBC & Differential [[id number]]  (Abnormal) Collected: 08/04/24 0304    Specimen: Blood Updated: 08/04/24 0321    Narrative:      The following orders were created for panel order CBC & Differential.  Procedure                               Abnormality         Status                     ---------                               -----------         ------                     CBC Auto Differential[[id number]]        Abnormal            Final result                 Please view results for these tests on the individual orders.    CBC Auto Differential [[id number]]  (Abnormal) Collected: 08/04/24  0304    Specimen: Blood Updated: 08/04/24 0321     WBC 18.00 10*3/mm3      RBC 3.89 10*6/mm3      Hemoglobin 11.8 g/dL      Hematocrit 35.6 %      MCV 91.5 fL      MCH 30.3 pg      MCHC 33.1 g/dL      RDW 12.0 %      RDW-SD 40.3 fl      MPV 10.1 fL      Platelets 250 10*3/mm3      Neutrophil % 72.6 %      Lymphocyte % 19.0 %      Monocyte % 5.7 %      Eosinophil % 1.9 %      Basophil % 0.2 %      Immature Grans % 0.6 %      Neutrophils, Absolute 13.06 10*3/mm3      Lymphocytes, Absolute 3.42 10*3/mm3      Monocytes, Absolute 1.02 10*3/mm3      Eosinophils, Absolute 0.35 10*3/mm3      Basophils, Absolute 0.04 10*3/mm3      Immature Grans, Absolute 0.11 10*3/mm3      nRBC 0.0 /100 WBC           Imaging Results (Last 72 Hours)       Procedure Component Value Units Date/Time    [facility] [[id number]] Resulted: 08/05/24 1141     Updated: 08/05/24 1235          ECG/EMG Results (last 72 hours)       ** No results found for the last 72 hours. **          Orders (last 72 hrs)        Start     Ordered    08/05/24 1824  Update Accommodation Code  Once         08/05/24 1823    08/05/24 1800  NST Low risk >24 weeks:  Monitor for 30 minutes BID (Antepartum)  3 Times Daily,   Status:  Canceled        Comments: Fetal monitoring/NST:  Antepartum >24 weeks monitor fetus 60 minutes 3 times daily and PRN    08/05/24 1616    08/05/24 1800  Fetal Monitoring  3 Times Daily        Comments: 3 times daily x 1 hour and prn    08/05/24 1617    08/05/24 1756  Update Accommodation Code  Once         08/05/24 1755    08/05/24 1616  Saline Lock IV  Continuous         08/05/24 1616    08/05/24 1616  Advance Diet As Tolerated -  Until Discontinued         08/05/24 1616    08/05/24 1616  Diet: Regular/House; Fluid Consistency: Thin (IDDSI 0)  Diet Effective Now         08/05/24 1616    08/05/24 1023  Pt may go on W/C ride once transferred to Antepartum as long as she has an escort.  Nursing Communication  Once         Comments: Pt may go on W/C ride once transferred to Antepartum as long as she has an escort.    08/05/24 1740    08/05/24 0800  [facility]  1 Time Imaging         08/04/24 1235    08/05/24 0702  Inpatient Maternal & Fetal Medicine Consult  IN AM        Specialty:  Maternal and Fetal Medicine  Provider:  Elida Chowdary MD    08/04/24 0947    08/05/24 0600  CBC & Differential  Morning Draw         08/04/24 0954    08/05/24 0600  Comprehensive Metabolic Panel  Morning Draw         08/04/24 0954    08/05/24 0600  CBC Auto Differential  PROCEDURE ONCE         08/04/24 2202    08/04/24 2348  diphenhydrAMINE (BENADRYL) capsule 50 mg  Every 6 Hours PRN         08/05/24 0003    08/04/24 2345  Potassium  Once,   Status:  Canceled         08/04/24 1458    08/04/24 1545  potassium chloride (K-DUR,KLOR-CON) ER tablet 40 mEq  Every 4 Hours         08/04/24 1458    08/04/24 1530  Potassium Replacement - Follow Nurse / BPA Driven Protocol  Every 4 Hours,   Status:  Discontinued         08/04/24 1437    08/04/24 1500  famotidine (PEPCID) tablet 20 mg  2 Times Daily PRN         08/04/24 1500    08/04/24 1455  Potassium Replacement - Follow Nurse / BPA Driven Protocol  As Needed         08/04/24 1456    08/04/24 1450  Potassium Replacement - Follow Nurse / BPA Driven Protocol  As Needed,   Status:  Discontinued         08/04/24 1451    08/04/24 1446  Potassium Replacement - Follow Nurse / BPA Driven Protocol  As Needed,   Status:  Discontinued         08/04/24 1447    08/04/24 1230  Diet: Liquid; Clear Liquid; Fluid Consistency: Thin (IDDSI 0)  Diet Effective Now,   Status:  Canceled         08/04/24 1229    08/04/24 0900  sodium chloride 0.9 % flush 10 mL  Every 12 Hours Scheduled         08/04/24 0228    08/04/24 0900  CBC (No Diff)  Once         08/04/24 0303    08/04/24 0759  Urinalysis, Microscopic Only - Urine, Clean Catch  Once         08/04/24 0758    08/04/24 0430  betamethasone  "acetate-betamethasone sodium phosphate (CELESTONE SOLUSPAN) injection 12 mg  Every 24 Hours         08/04/24 0330    08/04/24 0345  Fibrinogen  STAT         08/04/24 0213    08/04/24 0345  Protime-INR  STAT         08/04/24 0239    08/04/24 0345  aPTT  STAT         08/04/24 0239    08/04/24 0342  Inpatient Case Management  Consult  Once        Provider:  (Not yet assigned)    08/04/24 0341    08/04/24 0300  lactated ringers infusion  Continuous,   Status:  Discontinued         08/04/24 0213    08/04/24 0256  Update Accommodation Code  Once         08/04/24 0257    08/04/24 0255  ondansetron ODT (ZOFRAN-ODT) disintegrating tablet 8 mg  Every 8 Hours PRN        Placed in \"Or\" Linked Group    08/04/24 0257    08/04/24 0255  ondansetron (ZOFRAN) injection 4 mg  Every 8 Hours PRN        Placed in \"Or\" Linked Group    08/04/24 0257    08/04/24 0232  Admit To Obstetrics Inpatient  Once         08/04/24 0233    08/04/24 0232  Update Accommodation Code  Once         08/04/24 0233    08/04/24 0232  NST Low risk >24 weeks:  Monitor for 30 minutes BID (Antepartum)  Once,   Status:  Canceled        Comments: Fetal monitoring/NST:  Antepartum >24 weeks monitor fetus 30 minutes twice daily and PRN    08/04/24 0233    08/04/24 0231  ondansetron ODT (ZOFRAN-ODT) disintegrating tablet 8 mg  Every 8 Hours PRN,   Status:  Discontinued        Placed in \"Or\" Linked Group    08/04/24 0233    08/04/24 0231  ondansetron (ZOFRAN) injection 4 mg  Every 8 Hours PRN,   Status:  Discontinued        Placed in \"Or\" Linked Group    08/04/24 0233    08/04/24 0231  docusate sodium (COLACE) capsule 100 mg  2 Times Daily PRN         08/04/24 0233    08/04/24 0231  Treponema pallidum AB w/Reflex RPR  Once         08/04/24 0230    08/04/24 0231  Comprehensive Metabolic Panel  STAT         08/04/24 0230 08/04/24 0228  Insert Peripheral IV  Once         08/04/24 0228    08/04/24 0228  PERIPHERAL IV CARE - Connectors / Hubs Must Be " Scrubbed 15 Seconds Using 70% Alcohol & Allowed to Dry Before Accessing Line  Continuous         08/04/24 0228 08/04/24 0227  sodium chloride 0.9 % flush 10 mL  As Needed         08/04/24 0228 08/04/24 0214  PT & PTT ([facility])  STAT,   Status:  Canceled         08/04/24 0213 08/04/24 0214  CBC & Differential  STAT         08/04/24 0213 08/04/24 0214  Type & Screen  STAT         08/04/24 0213 08/04/24 0214  CBC Auto Differential  PROCEDURE ONCE         08/04/24 0214 08/04/24 0200  Vital Signs  Per Hospital Policy        Comments: Repeat blood pressure every 30 minutes, until specified.    08/04/24 0200 08/04/24 0200  Fetal Nonstress Test  Once        Comments: For any patient >= 24 wks gestation.    08/04/24 0200 08/04/24 0200  Pulse Oximetry, Spot  Once         08/04/24 0200 08/04/24 0200  POC Glucose STAT  STAT        Comments: For any patient with any type of diabetes.      08/04/24 0200 08/04/24 0200  Obtain Fetal Heart Rate - For Gestational Age <24 weeks  Once         08/04/24 0200 08/04/24 0200  Continuous Uterine Monitoring - For Gestational Age >24 weeks  Once,   Status:  Canceled         08/04/24 0200 08/04/24 0200  NPO Diet NPO Type: Strict NPO  Diet Effective Now,   Status:  Canceled         08/04/24 0200 08/04/24 0200  Fetal Fibronectin - Vaginal Fluid, Cervix  Once,   Status:  Canceled        Comments: Collect and hold if labor is suspected in a patient 24-34 weeks (abdominal pain, contractions, suspected ROM)      08/04/24 0200 08/04/24 0200  Vaginal Exam  Once,   Status:  Canceled        Comments: Perform unless patient has vaginal bleeding without known placental site, known placental previa, <36 weeks with no abdominal , or complain of ROM and <36 weeks    08/04/24 0200 08/04/24 0200  POC PH Fluid (Nitrazine)  Once        Comments: If patient is presenting with possible ROM or leaking      08/04/24 0200 08/04/24 0200  Urinalysis With Microscopic If  Indicated (No Culture) - Urine, Clean Catch  Once         08/04/24 0200    Unscheduled  Change Peripheral IV Site  As Needed      Comments: Frequency Per Facility Policy    08/04/24 0228    Unscheduled  PERIPHERAL IV CARE - Change Dressing As Needed When Damp, Loose or Soiled  As Needed       08/04/24 0228    Signed and Held  Code Status and Medical Interventions: CPR (Attempt to Resuscitate); Full Support  Continuous         Signed and Held    Signed and Held  Place Sequential Compression Device  Once         Signed and Held    Signed and Held  Maintain Sequential Compression Device  Continuous         Signed and Held    Signed and Held  Vital Signs q 4 while awake  Every 4 Hours      Comments: While the patient is awake.    Signed and Held    Signed and Held  Intermittent Auscultation FOR LOW RISK PATIENTS - NST on Admission Along With Intermittent Auscultation of Fetal Heart Tones.  Per Order Details        Comments: Intermittent Auscultation FOR LOW RISK PATIENTS - NST on Admission Along With Intermittent Auscultation of Fetal Heart Tones and PRN    Signed and Held    Signed and Held  Antepartum Patients  <24 Weeks - Document Fetal Heart Tones Daily and PRN.  Per Order Details        Comments: For Antepartum Patients Less Than 24 Weeks - Document Fetal Heart Tones Daily & PRN.    Signed and Held    Signed and Held  Notify Provider (Specified)  Continuous        Comments: Open Order Report to View Parameters Requiring Provider Notification    Signed and Held    Signed and Held  Notify Provider of Tachysystole (Per Hospital Algorithm)  Continuous        Comments: Open Order Report to View Parameters Requiring Provider Notification    Signed and Held    Signed and Held  Notify Provider if Membranes Ruptured, Bleeding Greater Than 1 Pad Per Hour, Fetal Heart Tone Abnormality or Severe Pain  Continuous        Comments: Open Order Report to View Parameters Requiring Provider Notification    Signed and Held    Signed  and Held  Weigh Patient Daily  Daily       Signed and Held    Signed and Held  Initiate Group Beta Strep (GBS) Prophylaxis Protocol, If Criteria Met  Continuous        Comments: NO TREATMENT RECOMMENDED IF: 1) Maternal GBS Status Known Negative 2) Scheduled  Birth With Intact Membranes, Not in Labor 3) Maternal GBS Status Unknown, No Risk Factors  TREAT WITH ANTIBIOTICS IF:  1) Maternal GBS Status Known Positive 2) Maternal GBS Status Unknown With Risk Factors: a)  Previous Infant Affected By GBS Infection b) GBS Urinary Tract Infection (UTI) or Bacteriuria During Pregnancy c) Unexplained Maternal Fever (100.4F (38C) or Greater) During Labor d)  Prolonged Rupture of Membranes (18 or More Hours) e) Gestational Age Less Than 37 Weeks    Signed and Held    Signed and Held  Position Change - For Intra-Uterine Resusitation for Hypertonus, HyperStimulation or Non-Reassuring Fetal Status  As Needed         Signed and Held    Signed and Held  Position Change - For Intra-Uterine Resusitation for Hypertonus, HyperStimulation or Non-Reassuring Fetal Status  As Needed         Signed and Held    Signed and Held  CBC (No Diff)  Once         Signed and Held    Signed and Held  Insert Peripheral IV  Once         Signed and Held    Signed and Held  Saline Lock & Maintain IV Access  Continuous         Signed and Held    Signed and Held  sodium chloride 0.9 % flush 10 mL  Every 12 Hours Scheduled         Signed and Held    Signed and Held  sodium chloride 0.9 % flush 10 mL  As Needed         Signed and Held    Signed and Held  sodium chloride 0.9 % infusion 40 mL  As Needed         Signed and Held    Signed and Held  lidocaine (XYLOCAINE) 1 % injection 0.5 mL  Once As Needed         Signed and Held    Signed and Held  acetaminophen (TYLENOL) tablet 650 mg  Every 4 Hours PRN         Signed and Held    Signed and Held  bisacodyl (DULCOLAX) suppository 10 mg  Daily PRN         Signed and Held    Signed and Held  Diet:  Regular/House; Fluid Consistency: Thin (IDDSI 0)  Diet Effective Now         Signed and Held    Signed and Held  betamethasone acetate-betamethasone sodium phosphate (CELESTONE SOLUSPAN) injection 12 mg  Once         Signed and Held    Signed and Held  Code Status and Medical Interventions: CPR (Attempt to Resuscitate); Full Support  Continuous         Signed and Held    Signed and Held  Place Sequential Compression Device  Once         Signed and Held    Signed and Held  Maintain Sequential Compression Device  Continuous         Signed and Held    Signed and Held  Vital Signs q 4 while awake  Every 4 Hours      Comments: While the patient is awake.    Signed and Held    Signed and Held  Notify Provider (Specified)  Continuous        Comments: Open Order Report to View Parameters Requiring Provider Notification    Signed and Held    Signed and Held  Notify Provider of Tachysystole (Per Hospital Algorithm)  Continuous        Comments: Open Order Report to View Parameters Requiring Provider Notification    Signed and Held    Signed and Held  Notify Provider if Membranes Ruptured, Bleeding Greater Than 1 Pad Per Hour, Fetal Heart Tone Abnormality or Severe Pain  Continuous        Comments: Open Order Report to View Parameters Requiring Provider Notification    Signed and Held    Signed and Held  Treponema pallidum AB w/Reflex RPR  Once         Signed and Held    Signed and Held  Type & Screen  Once         Signed and Held    Signed and Held  Insert Peripheral IV  Once         Signed and Held    Signed and Held  Saline Lock & Maintain IV Access  Continuous         Signed and Held    Signed and Held  sodium chloride 0.9 % flush 10 mL  Every 12 Hours Scheduled         Signed and Held    Signed and Held  sodium chloride 0.9 % flush 10 mL  As Needed         Signed and Held    Signed and Held  sodium chloride 0.9 % infusion 40 mL  As Needed         Signed and Held    Signed and Held  lidocaine (XYLOCAINE) 1 % injection 0.5  mL  Once As Needed         Signed and Held    Signed and Held  acetaminophen (TYLENOL) tablet 650 mg  Every 4 Hours PRN         Signed and Held    Signed and Held  Bed Rest With Bathroom Privileges  Once         Signed and Held    Signed and Held  Continuous Fetal Monitoring With NST on Admission and Prior to Initiation of Oxytocin.  Per Order Details        Comments: Continuous Fetal Monitoring With NST on Admission & Prior to Initiation of Oxytocin.    Signed and Held    Signed and Held  dextrose 5 % and sodium chloride 0.45 % infusion  Continuous         Signed and Held    Signed and Held  betamethasone acetate-betamethasone sodium phosphate (CELESTONE SOLUSPAN) injection 12 mg  Every 24 Hours         Signed and Held                     Operative/Procedure Notes (last 72 hours)        Darrell Boateng MD at 24 1147        Procedure Orders    1. Fetal Nonstress Test [[id number]] ordered by Greg Montes De Oca MD at 24 0200                   Procedure: Nonstress test    Interpreting physician: Dr. Boateng    Gestational age: 29 weeks and 5 days    Diagnosis:  Third trimester bleeding  Previous  x 3  Complete placenta previa  Advanced maternal age    Findings:  NST: 125/reactive/moderate variability/no decelerations  Tocometry: No contractions    Patient was monitored for a minimum of 20 minutes.      Electronically signed by Darrell Boateng MD at 24 1148          Physician Progress Notes (last 72 hours)        Darrell Boateng MD at 24 1127           [facility]     Progress Note    Patient Name: Isabella Christiansen  : 1990  MRN: [id number]  Primary Care Physician:  Aleena Garcia, APRN  Date of admission: 2024    Subjective   Subjective       HPI:  Patient Reports no bleeding at all today.  Had some scant spotting/small clot yesterday around 4:30 PM.  She denies abdominal pain or contractions.  She says she feels well.  She reports  that she would really like to be able to go outside briefly if possible.      Objective   Objective     Vitals:   Temp:  [97.7 °F (36.5 °C)-98.1 °F (36.7 °C)] 97.7 °F (36.5 °C)  Heart Rate:  [84-87] 85  Resp:  [16] 16  BP: (110-116)/(68-73) 112/68  Physical Exam     Gen: No acute distress, awake and oriented times three  Abdomen: Gravid, soft, nontender  Pelvic exam deferred due to previous  Rectal: Deferred  Psych: Good judgement and insight, normal affect and mood   Neurological cranial nerves II through XII intact, no gross deficits    Result Review    Result Review:  I have personally reviewed the results from the time of this admission to 8/5/2024 11:27 EDT and agree with these findings:  [x]  Laboratory list / accordion  []  Microbiology  [x]  Radiology  []  EKG/Telemetry   []  Cardiology/Vascular   []  Pathology  []  Old records  []  Other:  Most notable findings include:     Admission on 08/04/2024   Component Date Value Ref Range Status    Color, UA 08/04/2024 Yellow  Yellow, Straw Final    Appearance, UA 08/04/2024 Clear  Clear Final    pH, UA 08/04/2024 7.0  5.0 - 8.0 Final    Specific Gravity, UA 08/04/2024 1.010  1.005 - 1.030 Final    Glucose, UA 08/04/2024 Negative  Negative Final    Ketones, UA 08/04/2024 Trace (A)  Negative Final    Bilirubin, UA 08/04/2024 Negative  Negative Final    Blood, UA 08/04/2024 Moderate (2+) (A)  Negative Final    Protein, UA 08/04/2024 Negative  Negative Final    Leuk Esterase, UA 08/04/2024 Negative  Negative Final    Nitrite, UA 08/04/2024 Negative  Negative Final    Urobilinogen, UA 08/04/2024 1.0 E.U./dL  0.2 - 1.0 E.U./dL Final    ABO Type 08/04/2024 AB   Final    RH type 08/04/2024 Positive   Final    Antibody Screen 08/04/2024 Negative   Final    T&S Expiration Date 08/04/2024 8/7/2024 11:59:59 PM   Final    Fibrinogen 08/04/2024 394  219 - 464 mg/dL Final    WBC 08/04/2024 18.00 (H)  3.40 - 10.80 10*3/mm3 Final    RBC 08/04/2024 3.89  3.77 - 5.28 10*6/mm3 Final     Hemoglobin 08/04/2024 11.8 (L)  12.0 - 15.9 g/dL Final    Hematocrit 08/04/2024 35.6  34.0 - 46.6 % Final    MCV 08/04/2024 91.5  79.0 - 97.0 fL Final    MCH 08/04/2024 30.3  26.6 - 33.0 pg Final    MCHC 08/04/2024 33.1  31.5 - 35.7 g/dL Final    RDW 08/04/2024 12.0 (L)  12.3 - 15.4 % Final    RDW-SD 08/04/2024 40.3  37.0 - 54.0 fl Final    MPV 08/04/2024 10.1  6.0 - 12.0 fL Final    Platelets 08/04/2024 250  140 - 450 10*3/mm3 Final    Neutrophil % 08/04/2024 72.6  42.7 - 76.0 % Final    Lymphocyte % 08/04/2024 19.0 (L)  19.6 - 45.3 % Final    Monocyte % 08/04/2024 5.7  5.0 - 12.0 % Final    Eosinophil % 08/04/2024 1.9  0.3 - 6.2 % Final    Basophil % 08/04/2024 0.2  0.0 - 1.5 % Final    Immature Grans % 08/04/2024 0.6 (H)  0.0 - 0.5 % Final    Neutrophils, Absolute 08/04/2024 13.06 (H)  1.70 - 7.00 10*3/mm3 Final    Lymphocytes, Absolute 08/04/2024 3.42 (H)  0.70 - 3.10 10*3/mm3 Final    Monocytes, Absolute 08/04/2024 1.02 (H)  0.10 - 0.90 10*3/mm3 Final    Eosinophils, Absolute 08/04/2024 0.35  0.00 - 0.40 10*3/mm3 Final    Basophils, Absolute 08/04/2024 0.04  0.00 - 0.20 10*3/mm3 Final    Immature Grans, Absolute 08/04/2024 0.11 (H)  0.00 - 0.05 10*3/mm3 Final    nRBC 08/04/2024 0.0  0.0 - 0.2 /100 WBC Final    Treponemal AB Total 08/04/2024 Non-Reactive  Non-Reactive Final    Glucose 08/04/2024 100 (H)  65 - 99 mg/dL Final    BUN 08/04/2024 2 (L)  6 - 20 mg/dL Final    Creatinine 08/04/2024 0.42 (L)  0.57 - 1.00 mg/dL Final    Sodium 08/04/2024 137  136 - 145 mmol/L Final    Potassium 08/04/2024 3.2 (L)  3.5 - 5.2 mmol/L Final    Chloride 08/04/2024 106  98 - 107 mmol/L Final    CO2 08/04/2024 21.0 (L)  22.0 - 29.0 mmol/L Final    Calcium 08/04/2024 8.4 (L)  8.6 - 10.5 mg/dL Final    Total Protein 08/04/2024 5.8 (L)  6.0 - 8.5 g/dL Final    Albumin 08/04/2024 3.4 (L)  3.5 - 5.2 g/dL Final    ALT (SGPT) 08/04/2024 10  1 - 33 U/L Final    AST (SGOT) 08/04/2024 9  1 - 32 U/L Final    Alkaline Phosphatase  08/04/2024 127 (H)  39 - 117 U/L Final    Total Bilirubin 08/04/2024 <0.2  0.0 - 1.2 mg/dL Final    Globulin 08/04/2024 2.4  gm/dL Final    A/G Ratio 08/04/2024 1.4  g/dL Final    BUN/Creatinine Ratio 08/04/2024 4.8 (L)  7.0 - 25.0 Final    Anion Gap 08/04/2024 10.0  5.0 - 15.0 mmol/L Final    eGFR 08/04/2024 130.2  >60.0 mL/min/1.73 Final    Protime 08/04/2024 13.7  11.7 - 14.2 Seconds Final    INR 08/04/2024 1.03  0.90 - 1.10 Final    PTT 08/04/2024 25.1  22.7 - 35.4 seconds Final    WBC 08/04/2024 11.53 (H)  3.40 - 10.80 10*3/mm3 Final    RBC 08/04/2024 3.83  3.77 - 5.28 10*6/mm3 Final    Hemoglobin 08/04/2024 11.5 (L)  12.0 - 15.9 g/dL Final    Hematocrit 08/04/2024 34.9  34.0 - 46.6 % Final    MCV 08/04/2024 91.1  79.0 - 97.0 fL Final    MCH 08/04/2024 30.0  26.6 - 33.0 pg Final    MCHC 08/04/2024 33.0  31.5 - 35.7 g/dL Final    RDW 08/04/2024 12.0 (L)  12.3 - 15.4 % Final    RDW-SD 08/04/2024 40.3  37.0 - 54.0 fl Final    MPV 08/04/2024 10.2  6.0 - 12.0 fL Final    Platelets 08/04/2024 240  140 - 450 10*3/mm3 Final    RBC, UA 08/04/2024 21-50 (A)  None Seen, 0-2 /HPF Final    WBC, UA 08/04/2024 0-2  None Seen, 0-2 /HPF Final    Bacteria, UA 08/04/2024 None Seen  None Seen /HPF Final    Squamous Epithelial Cells, UA 08/04/2024 0-2  None Seen, 0-2 /HPF Final    Hyaline Casts, UA 08/04/2024 None Seen  None Seen /LPF Final    Methodology 08/04/2024 Automated Microscopy   Final    Glucose 08/05/2024 122 (H)  65 - 99 mg/dL Final    BUN 08/05/2024 <2 (L)  6 - 20 mg/dL Final    Creatinine 08/05/2024 0.29 (L)  0.57 - 1.00 mg/dL Final    Sodium 08/05/2024 138  136 - 145 mmol/L Final    Potassium 08/05/2024 3.9  3.5 - 5.2 mmol/L Final    Chloride 08/05/2024 109 (H)  98 - 107 mmol/L Final    CO2 08/05/2024 19.4 (L)  22.0 - 29.0 mmol/L Final    Calcium 08/05/2024 8.8  8.6 - 10.5 mg/dL Final    Total Protein 08/05/2024 5.2 (L)  6.0 - 8.5 g/dL Final    Albumin 08/05/2024 3.0 (L)  3.5 - 5.2 g/dL Final    ALT (SGPT) 08/05/2024  7  1 - 33 U/L Final    AST (SGOT) 08/05/2024 10  1 - 32 U/L Final    Alkaline Phosphatase 08/05/2024 102  39 - 117 U/L Final    Total Bilirubin 08/05/2024 <0.2  0.0 - 1.2 mg/dL Final    Globulin 08/05/2024 2.2  gm/dL Final    A/G Ratio 08/05/2024 1.4  g/dL Final    BUN/Creatinine Ratio 08/05/2024    Final    Unable to calculate Bun/Crea Ratio.    Anion Gap 08/05/2024 9.6  5.0 - 15.0 mmol/L Final    eGFR 08/05/2024 142.3  >60.0 mL/min/1.73 Final    WBC 08/05/2024 12.81 (H)  3.40 - 10.80 10*3/mm3 Final    RBC 08/05/2024 3.56 (L)  3.77 - 5.28 10*6/mm3 Final    Hemoglobin 08/05/2024 10.7 (L)  12.0 - 15.9 g/dL Final    Hematocrit 08/05/2024 32.8 (L)  34.0 - 46.6 % Final    MCV 08/05/2024 92.1  79.0 - 97.0 fL Final    MCH 08/05/2024 30.1  26.6 - 33.0 pg Final    MCHC 08/05/2024 32.6  31.5 - 35.7 g/dL Final    RDW 08/05/2024 11.8 (L)  12.3 - 15.4 % Final    RDW-SD 08/05/2024 39.8  37.0 - 54.0 fl Final    MPV 08/05/2024 10.2  6.0 - 12.0 fL Final    Platelets 08/05/2024 208  140 - 450 10*3/mm3 Final    Neutrophil % 08/05/2024 77.7 (H)  42.7 - 76.0 % Final    Lymphocyte % 08/05/2024 15.9 (L)  19.6 - 45.3 % Final    Monocyte % 08/05/2024 5.2  5.0 - 12.0 % Final    Eosinophil % 08/05/2024 0.1 (L)  0.3 - 6.2 % Final    Basophil % 08/05/2024 0.2  0.0 - 1.5 % Final    Immature Grans % 08/05/2024 0.9 (H)  0.0 - 0.5 % Final    Neutrophils, Absolute 08/05/2024 9.97 (H)  1.70 - 7.00 10*3/mm3 Final    Lymphocytes, Absolute 08/05/2024 2.04  0.70 - 3.10 10*3/mm3 Final    Monocytes, Absolute 08/05/2024 0.66  0.10 - 0.90 10*3/mm3 Final    Eosinophils, Absolute 08/05/2024 0.01  0.00 - 0.40 10*3/mm3 Final    Basophils, Absolute 08/05/2024 0.02  0.00 - 0.20 10*3/mm3 Final    Immature Grans, Absolute 08/05/2024 0.11 (H)  0.00 - 0.05 10*3/mm3 Final    nRBC 08/05/2024 0.0  0.0 - 0.2 /100 WBC Final     NST: 125/reactive/moderate variability/no decelerations  Tocometry: No contractions    MFM ultrasound: Pending    Assessment & Plan   Assessment /  Plan     Brief Patient Summary:  Isabella Christiansen is a 34 y.o. female  5 para 3-0-1-3 at 29-5/7 weeks gestational age with third trimester vaginal bleeding, resolved today  2.  Posterior placenta previa  3.  History of previous  x 3  4.  Advanced maternal age  Status post full course of steroids on  and     Active Hospital Problems:  Active Hospital Problems    Diagnosis     **Vaginal bleeding affecting early pregnancy     History of      Placenta previa in third trimester      Plan:    1.  Patient has not had any vaginal bleeding today.  Last episode of bleeding was a scant amount of spotting yesterday at about 4:30 PM.  She has received a full course of steroids.  When she has been 24 hours without bleeding we can transfer to the antepartum unit and discontinue continuous fetal monitoring in favor of 3 times daily monitoring.  Would recommend at least another 24 hours in the hospital without any bleeding prior to discharge home.  Awaiting maternal-fetal medicine recommendations.    VTE Prophylaxis:  Mechanical VTE prophylaxis orders are signed & held.      CODE STATUS: Full code     Disposition:  I expect patient to be discharged in 1-2 days.    Darrell Boateng MD    Electronically signed by Darrell Boateng MD at 24 1146       Eloisa Yuen MD at 24 0915          Antepartum Progress Note    Patient name: Isabella Christiansen  Age: 34 y.o.  Sex: female  YOB: 1990   MRN: [id number]  Admission Date: 2024    Gestational age: 29w4d    Chief Complaint   Patient presents with    Vaginal Bleeding     SUMEET. Pt reports heavy vaginal bleeding  starting at 2330 when going to the bathroom with some clots. Reports good FM. Denies contractions      Subjective:    Isabella Christiansen is a 34 y.o.  at 29w4d who presented with vaginal bleeding that started at 2330 last night. She has pregnancy complicated by complete placenta previa and  history of 3 previous  sections.     This morning she has had a small clot out of the vagina at 0745 and has small amount of dark red blood on her pad. Denies abdominal pain, contractions, leakage of fluid. She reports active fetal movement.     Objective:   Temp:  [98.1 °F (36.7 °C)-98.3 °F (36.8 °C)] 98.1 °F (36.7 °C)  Heart Rate:  [] 91  Resp:  [16] 16  BP: (128-146)/(78-90) 128/78    Exam:     Physical Examination: General appearance - alert, well appearing, and in no distress  Mental status - alert, oriented to person, place, and time  Eyes - sclera anicteric, left eye normal, right eye normal  Ears - right ear normal, left ear normal  Neck - normal ROM  Chest - no increased work of breathing, regular respirations   Abdomen - soft, nontender, gravid   Pelvic - deferred- pad with small amount of blood   Neurological - alert, oriented, normal speech, no focal findings or movement disorder noted  Musculoskeletal - no joint tenderness, deformity or swelling  Extremities - no pedal edema noted  Skin - normal coloration and turgor, no rashes, no suspicious skin lesions noted    Labs:  Lab Results   Component Value Date    WBC 18.00 (H) 2024    HGB 11.8 (L) 2024    HCT 35.6 2024    MCV 91.5 2024     2024     CMP          2023    10:36 2024    03:04   CMP   Glucose 77  100    BUN 6  2    Creatinine 0.68  0.42    EGFR  130.2    Sodium 140  137    Potassium 4.4  3.2    Chloride 102  106    Calcium 10.1  8.4    Total Protein 7.0     Total Protein  5.8    Albumin 4.6  3.4    Globulin 2.4     Globulin  2.4    Total Bilirubin 0.3  <0.2    Alkaline Phosphatase 104  127    AST (SGOT) 17  9    ALT (SGPT) 13  10    Albumin/Globulin Ratio  1.4    BUN/Creatinine Ratio 8.8  4.8    Anion Gap  10.0          NST: 120s baseline, moderate variability, accels +, decels -  [place]as: quiet          Assessment:      IUP at 29w4d   Vaginal bleeding in setting of complete placenta  previa  History of 3 previous  sections  Unwanted fertility   Leukocytosis     Plan:    Patient admitted for vaginal bleeding in setting of complete placenta previa. The patient had small amount of bleeding this morning. Will continue to monitor. Patient is to remain on continuous fetal monitoring and tocometry until at least 24 hours out from last bleeding episodes. She should be observed for at least 5 days in regards to bleeding.  steroids been started for fetal benefit if  delivery indicated. Will plan for magnesium sulfate for neuroprotection if concern for imminent delivery. Will allow clear diet at this time. Patient is aware that if maternal deterioration of fetal distress occur, emergency  section will be planned. Will order ultrasound and MFM consult for tomorrow regarding placenta previa. Reviewed labs and will plan to repeat CBC today and order AM labs for tomorrow. Unsure of etiology of leukocytosis of 18 K and will continue to monitor.     Dispo: Inpatient management of at least 5 days      Eloisa Yuen MD  2024  09:16 EDT        Electronically signed by Eloisa Yuen MD at 24 0907       Consult Notes (last 72 hours)  Notes from 24 2317 through 24 2317   No notes of this type exist for this encounter.

## 2024-08-06 NOTE — TELEPHONE ENCOUNTER
Rx Refill Note  Requested Prescriptions     Pending Prescriptions Disp Refills    Symbicort 80-4.5 MCG/ACT inhaler [Pharmacy Med Name: SYMBICORT 80/4.5MCG (120  ORAL INH)] 10.2 g 1     Sig: INHALE 2 PUFFS BY MOUTH TWICE DAILY      Last office visit with prescribing clinician: 11/1/2023   Last telemedicine visit with prescribing clinician: Visit date not found   Next office visit with prescribing clinician: Visit date not found                         Would you like a call back once the refill request has been completed: [] Yes [] No    If the office needs to give you a call back, can they leave a voicemail: [] Yes [] No    Yvonne Martinez MA  08/06/24, 11:19 EDT

## 2024-08-06 NOTE — DISCHARGE SUMMARY
Date of Discharge:  2024    Discharge Diagnosis: Vaginal bleeding, resolved; placenta previa    Presenting Problem/History of Present Illness  Active Hospital Problems    Diagnosis  POA    **Vaginal bleeding affecting early pregnancy [O20.9]  Yes    Multigravida of advanced maternal age in third trimester [O09.523]  Yes    History of  [Z98.891]  Not Applicable    Placenta previa in third trimester [O44.03]  Yes      Resolved Hospital Problems   No resolved problems to display.          Hospital Course  Patient is a 34 y.o. female presented at 29w4d with episode of vaginal bleeding in the setting of a known complete posterior placenta previa.  She was admitted and received a course of betamethasone for fetal lung maturity.  She did not require tocolytics and bleeding resolved.  At 29w6d, she had not had any bleeding in 48 hours and strongly desired discharge home.  Fetal status was reassuring and an MRI was done that could not rule out placenta accreta but no obvious signs of accreta. She was discharged home with instructions to limit activity and return with any bleeding.  She was also advised to followup with Dr. Cooper this week.    Procedures Performed    Procedure(s):   SECTION REPEAT WITH SALPINGECTOMY  -------------------   1147 Fetal Nonstress Test    Consults:   Consults       Date and Time Order Name Status Description    2024  9:47 AM Inpatient Maternal & Fetal Medicine Consult Completed               Condition on Discharge:  stable    Vital Signs  Temp:  [98 °F (36.7 °C)-98.3 °F (36.8 °C)] 98.1 °F (36.7 °C)  Heart Rate:  [] 91  Resp:  [16-17] 16  BP: (107-118)/(62-75) 107/62    Physical Exam:   See progress note    Discharge Disposition  Home or Self Care    Discharge Medications     Discharge Medications        Continue These Medications        Instructions Start Date   prenatal vitamin 27-0.8 27-0.8 MG tablet tablet   1 tablet, Oral, Daily      Symbicort 80-4.5  MCG/ACT inhaler  Generic drug: budesonide-formoterol   2 puffs, Inhalation, 2 Times Daily               Discharge Diet:     Activity at Discharge:   Activity Instructions       Pelvic Rest              Follow-up Appointments  Future Appointments   Date Time Provider Department Center   8/14/2024 10:20 AM [facility]   8/14/2024 11:15 AM Jeffery Cooper MD [facility]     Additional Instructions for the Follow-ups that You Need to Schedule       Call MD With Problems / Concerns   As directed      Instructions: Call with heavy bleeding, uncontrolled pain, or fevers    Order Comments: Instructions: Call with heavy bleeding, uncontrolled pain, or fevers         Discharge Follow-up with Specified Provider: Dr. Cooper   As directed      To: Dr. Cooper   Follow Up Details: Followup this week                Test Results Pending at Discharge       Frances Noyola MD  08/06/24  16:24 EDT    Time: Discharge >30 min

## 2024-08-06 NOTE — NON STRESS TEST
Isabella Boyde, a  at 29w6d with an KEYLA of 10/16/2024, by Last Menstrual Period, was seen at [address] for a nonstress test.    Chief Complaint   Patient presents with    Vaginal Bleeding     SUMEET. Pt reports heavy vaginal bleeding  starting at 2330 when going to the bathroom with some clots. Reports good FM. Denies contractions       Patient Active Problem List   Diagnosis    Attention deficit    Chronic low back pain    Headache, variant migraine    Heart palpitations    Insomnia    Lumbar facet arthropathy    Seasonal allergies    Primary hypertension    Current smoker    Mild persistent asthma    Right chronic serous otitis media    Arthralgia of right temporomandibular joint    Fatigue    Class 1 obesity without serious comorbidity with body mass index (BMI) of 30.0 to 30.9 in adult    Mild episode of recurrent major depressive disorder    Anxiety    Localized edema    Folate deficiency    History of     Placenta previa in third trimester    Vaginal bleeding affecting early pregnancy    Multigravida of advanced maternal age in third trimester       Start Time: 09  Stop Time: 1038      Interpretation A  Nonstress Test Interpretation A: Reactive  Comments A: Appropriate for gestational age

## 2024-08-06 NOTE — NON STRESS TEST
Isabella MASTERS Елена, a  at 29w6d with an KEYLA of 10/16/2024, by Last Menstrual Period, was seen at [address] for a nonstress test.    Chief Complaint   Patient presents with    Vaginal Bleeding     SUMEET. Pt reports heavy vaginal bleeding  starting at 2330 when going to the bathroom with some clots. Reports good FM. Denies contractions       Patient Active Problem List   Diagnosis    Attention deficit    Chronic low back pain    Headache, variant migraine    Heart palpitations    Insomnia    Lumbar facet arthropathy    Seasonal allergies    Primary hypertension    Current smoker    Mild persistent asthma    Right chronic serous otitis media    Arthralgia of right temporomandibular joint    Fatigue    Class 1 obesity without serious comorbidity with body mass index (BMI) of 30.0 to 30.9 in adult    Mild episode of recurrent major depressive disorder    Anxiety    Localized edema    Folate deficiency    History of     Placenta previa in third trimester    Vaginal bleeding affecting early pregnancy    Multigravida of advanced maternal age in third trimester       Start Time: 2100  Stop Time: 2200    Interpretation A  Nonstress Test Interpretation A: Reactive  Comments A: Appropriate for gestional age.

## 2024-08-06 NOTE — PROGRESS NOTES
[facility]     Progress Note    Patient Name: Isabella Christiansen  : 1990  MRN: [id number]  Primary Care Physician:  Aleena Garcia APRN  Date of admission: 2024    Subjective   Subjective     Chief Complaint: Vaginal bleeding, placenta previa    History of Present Illness  Patient Reports she has not had any bleeding since .  She is strongly desiring discharge home today.  She denies any pain and reports good fetal movement.    Review of Systems   Gastrointestinal:  Negative for abdominal pain.   Genitourinary:  Negative for vaginal bleeding.   All other systems reviewed and are negative.      Objective   Objective     Vitals:   Temp:  [98 °F (36.7 °C)] 98 °F (36.7 °C)  Heart Rate:  [] 99  Resp:  [16-17] 17  BP: (117-118)/(69-75) 118/69    Physical Exam  Vitals reviewed.   Constitutional:       Appearance: Normal appearance.   Cardiovascular:      Rate and Rhythm: Normal rate.   Pulmonary:      Effort: Pulmonary effort is normal.   Abdominal:      Comments: Gravid uterus, nontender   Neurological:      General: No focal deficit present.      Mental Status: She is alert. Mental status is at baseline.   Psychiatric:         Mood and Affect: Mood normal.         Behavior: Behavior normal.          Result Review    Result Review:  I have personally reviewed the results from the time of this admission to 2024 10:23 EDT and agree with these findings:  [x]  Laboratory list / accordion  []  Microbiology  [x]  Radiology  []  EKG/Telemetry   []  Cardiology/Vascular   []  Pathology  []  Old records  []  Other:        Assessment & Plan   Assessment / Plan     Brief Patient Summary:  Isabella Christiansen is a 34 y.o. female -0-1-3 at 29 weeks and 6 days who was admitted after having an episode of vaginal bleeding with a known complete placenta previa in 3 previous C-sections.    Active Hospital Problems:  Active Hospital Problems    Diagnosis     **Vaginal bleeding affecting early pregnancy      Multigravida of advanced maternal age in third trimester     History of      Placenta previa in third trimester      Plan:     Patient's vaginal bleeding has resolved and she has not had any bleeding since .  She is strongly desiring discharge home today.  She has received betamethasone.  I counseled her extensively on limiting activity when she is at home and to return with any bleeding.  I also discussed with her that I would want her to be seen in the office this week and possible weekly follow-up after that.  Maternal-fetal medicine is recommending a repeat ultrasound this morning and MRI to reevaluate for placenta accreta and these have been ordered.  Otherwise, she has not had any bleeding in almost 48 hours and is stable for discharge home today.    VTE Prophylaxis:  Mechanical VTE prophylaxis orders are signed & held.          CODE STATUS:       Disposition:  I expect patient to be discharged today.    Frances Noyola MD

## 2024-08-06 NOTE — PROGRESS NOTES
"Discharge Planning Assessment  [facility]     Patient Name: Isabella Christiansen  MRN: [id number]  Today's Date: 8/6/2024    Admit Date: 8/4/2024        Discharge Needs Assessment    No documentation.                  Discharge Plan       Row Name 08/06/24 1035       Plan    Plan Comments Mother: Isabella Christiansen \"Haley\", MRN: [id number]. CSW consulted for \"Positive TAPS.\" Of note, mother's UDS was negative prenatally on 3/11. Mother's UDS was not collected on admit. CSW made the decision that mother did not require a needs-based assessment at this time. CSW will follow up with mother after delivery. CSW will remain available for psychosocial needs during mother's hospitalization. DALTON Pacheco.                  Continued Care and Services - Admitted Since 8/4/2024    No active coordination exists for this encounter.          Demographic Summary       Row Name 08/06/24 1034       General Information    Admission Type inpatient    Arrived From home    Referral Source nursing    Reason for Consult substance use concerns    General Information Comments Positive TAPS                   Functional Status    No documentation.                  Psychosocial    No documentation.                  Abuse/Neglect    No documentation.                  Legal    No documentation.                  Substance Abuse    No documentation.                  Patient Forms    No documentation.                     CARRIE Wallace    "

## 2024-08-06 NOTE — PLAN OF CARE
Goal Outcome Evaluation:  Plan of Care Reviewed With: patient        Progress: improving  Outcome Evaluation: VSS. RNST obtained. Patient reports positive fetal movement. Patient denies vaginal bleeding for entirety of this shift. No crampiness or contractions noted. Ultrasound results still pending from yesterday afternoon. Patient showered last night and is in good spirits.

## 2024-08-07 ENCOUNTER — TELEPHONE (OUTPATIENT)
Dept: OBSTETRICS AND GYNECOLOGY | Facility: CLINIC | Age: 34
End: 2024-08-07
Payer: MEDICAID

## 2024-08-07 NOTE — TELEPHONE ENCOUNTER
"    Caller: Isabella Christiansen \"Haley\"    Relationship to patient: Self    Best call back number: [phone] CALL ANYTIME, IT IS OKAY TO LVM.    Chief complaint: OB EDFOLLOW UP 08/06/24. ED DISCHARGE NOTE:She was also advised to followup with Dr. Cooper this week.    Type of visit: OB FOLLOW UP    Requested date:  IF POSSIBLE AFTER 12:00 PM.    If rescheduling, when is the original appointment: NA    Additional notes:PATIENT CALLED TO SCHEDULE ED FOLLOW UP. LINE ACCIDENTALLY DISCONNECTED WHILE HUB WAS ON HOLD WITH NON CLINICAL.   PATIENT WOULD ALSO MRI RESULTS PERFORMED 08/07/24.        "

## 2024-08-07 NOTE — TELEPHONE ENCOUNTER
Dr Cooper reviewed the MRI and it did not show any absolute definitive findings consistent with placenta accreta. We will discuss this further at your visit. As long as you are maintaining the light activity and modified bedrest at home, Dr Cooper thinks it is fine for you to keep your appointment on the 14th. Would you like me to cancel the 8/8/24 appointment and ultrasound for you?  My Chart message sent.

## 2024-08-07 NOTE — TELEPHONE ENCOUNTER
Dr. Cooper, pt recently seen in ED. Calling to schedule ED f/u, pt already has appt 8/14 would you like to see her sooner?    Also inquiring about MRI results if available. Please advise, thank you!

## 2024-08-14 ENCOUNTER — ROUTINE PRENATAL (OUTPATIENT)
Dept: OBSTETRICS AND GYNECOLOGY | Facility: CLINIC | Age: 34
End: 2024-08-14
Payer: MEDICAID

## 2024-08-14 ENCOUNTER — TELEPHONE (OUTPATIENT)
Dept: OBSTETRICS AND GYNECOLOGY | Facility: CLINIC | Age: 34
End: 2024-08-14

## 2024-08-14 VITALS — BODY MASS INDEX: 29.33 KG/M2 | SYSTOLIC BLOOD PRESSURE: 125 MMHG | WEIGHT: 165.6 LBS | DIASTOLIC BLOOD PRESSURE: 85 MMHG

## 2024-08-14 DIAGNOSIS — Z3A.31 31 WEEKS GESTATION OF PREGNANCY: Primary | ICD-10-CM

## 2024-08-14 DIAGNOSIS — Z98.891 HISTORY OF C-SECTION: ICD-10-CM

## 2024-08-14 DIAGNOSIS — O44.03 PLACENTA PREVIA IN THIRD TRIMESTER: ICD-10-CM

## 2024-08-14 LAB
GLUCOSE UR STRIP-MCNC: NEGATIVE MG/DL
PROT UR STRIP-MCNC: ABNORMAL MG/DL

## 2024-08-14 NOTE — PROGRESS NOTES
OB follow up     Isabella Christiansen is a 34 y.o.  31w0d being seen today for her obstetrical visit.  Patient reports no complaints. Fetal movement: normal.  Growth ultrasound done today:  Previous obstetric ultrasound is the comparison. Today's study shows cephalic presentation posterior complete placenta previa is noted with no obvious evidence of placenta accreta spectrum.. Fetal heart rate is 130 bpm. Normal amniotic fluid volume. Fetal growth on today's ultrasound is consistent with 60th percentile with abdominal circumference at 73rd percentile for 31 weeks 0 days.   She has been doing very well after a recent admission for first episode of vaginal bleeding with known placenta previa.  She was admitted on 2024 and discharged on 2024.  She stopped bleeding promptly and has not had any bleeding at home since that episode.  An MRI was obtained when she was admitted that showed the following:  IMPRESSION:  1.  The placenta completely covers the cervical os. The placenta is  posteriorly located and demonstrates an asymmetric contour irregularity  along its right posterior aspect as described in more detail above. The  retroplacental vasculature in this area is minimally more prominent when  compared to the left and more inferior aspects of the placenta. A  thickened intraplacental band is not definitively seen and the placenta  demonstrates a relatively homogenous appearance. Unfortunately, while  these findings are indeterminate they place the patient at increased  risk of placenta accreta spectrum.  2.  A few small areas of presumed retroplacental hemorrhage are present  measuring up to approximately 2.3 x 0.8 cm.  3.  The above findings were discussed with Dr. Dick by telephone by  Brett Dosbon at 3:45 p.m.   on  2024  .  4.  Intrauterine pregnancy in the cephalic position. Please note fetal  anatomy cannot be evaluated on this examination and correlation with  dedicated sonographic anatomy  scan is recommended.  5.  Moderate to severe right hydronephrosis.  6.  Other findings as above.  Her prenatal care is complicated by (and status): Prior  section x 3 with posterior placenta previa.    Review of Systems  Cramping/contractions : Negative  Vaginal bleeding: Negative  Fetal movement is normal    /85   Wt 75.1 kg (165 lb 9.6 oz)   LMP 01/10/2024   BMI 29.33 kg/m²     FHT: 130 BPM   Uterine Size: size equals dates       Assessment    Diagnoses and all orders for this visit:    1. 31 weeks gestation of pregnancy (Primary)  -     POC Urinalysis Dipstick  -     Ambulatory Referral to [facility]/Perinatology    2. History of   -     Ambulatory Referral to [facility]/Perinatology    3. Placenta previa in third trimester  -     Ambulatory Referral to [facility]/Perinatology        1) pregnancy at 31w0d         Plan    Reviewed this stage of pregnancy  Problem list updated   Follow up in 2 weeks I had extensive discussion with the patient regarding all the findings.  Today's ultrasound is reassuring for growth.  And, again, no evidence on ultrasound of placenta accreta spectrum.  I explained to her the difference between just placenta previa and placenta accreta spectrum and how that might affect delivery and even the possibility of needing to move toward hysterectomy as a scheduled procedure with delivery.  She is actually comfortable with that plan she does plan to have a tubal sterilization anyway.  I told her that presently, the goal for delivery would be 36 weeks but that repeat bleed episodes would certainly factor in as far as needing to deliver earlier than that.  I also discussed with her getting a more formal maternal-fetal medicine consultation to help us with those types of decisions and that possible repeat imaging might be needed.  She is comfortable with all of that and maternal-fetal medicine referral request was placed today.  Her daughter was to start college but is going to take the  first semester  via remote process in order to stay home and help her mother avoid significant activity that might increase the chance of her having a recurrent bleed episode.    Jeffery Cooper MD   8/14/2024  12:08 EDT

## 2024-08-14 NOTE — TELEPHONE ENCOUNTER
Provider: JAQUI DUMONT MD    Caller: KIRILL BLACK    Relationship to Patient: SELF      Phone Number: [phone]    Reason for Call: PT CALLED AND STATED SHE  WOULD LIKE TO SPEAK TO CLINICAL STAFF REGARDING HER MATERNAL FETAL APPT

## 2024-08-20 ENCOUNTER — TELEPHONE (OUTPATIENT)
Dept: OBSTETRICS AND GYNECOLOGY | Facility: CLINIC | Age: 34
End: 2024-08-20

## 2024-08-27 ENCOUNTER — TRANSCRIBE ORDERS (OUTPATIENT)
Dept: ULTRASOUND IMAGING | Facility: HOSPITAL | Age: 34
End: 2024-08-27
Payer: MEDICAID

## 2024-08-27 DIAGNOSIS — Z98.891 HISTORY OF C-SECTION: ICD-10-CM

## 2024-08-27 DIAGNOSIS — O09.523 MULTIGRAVIDA OF ADVANCED MATERNAL AGE IN THIRD TRIMESTER: Primary | ICD-10-CM

## 2024-08-27 DIAGNOSIS — O44.03 PLACENTA PREVIA IN THIRD TRIMESTER: ICD-10-CM

## 2024-08-28 ENCOUNTER — ROUTINE PRENATAL (OUTPATIENT)
Dept: OBSTETRICS AND GYNECOLOGY | Facility: CLINIC | Age: 34
End: 2024-08-28
Payer: MEDICAID

## 2024-08-28 VITALS — DIASTOLIC BLOOD PRESSURE: 79 MMHG | SYSTOLIC BLOOD PRESSURE: 120 MMHG | BODY MASS INDEX: 29.44 KG/M2 | WEIGHT: 166.2 LBS

## 2024-08-28 DIAGNOSIS — Z98.891 HISTORY OF C-SECTION: ICD-10-CM

## 2024-08-28 DIAGNOSIS — O44.03 PLACENTA PREVIA IN THIRD TRIMESTER: ICD-10-CM

## 2024-08-28 DIAGNOSIS — Z3A.33 33 WEEKS GESTATION OF PREGNANCY: Primary | ICD-10-CM

## 2024-08-28 DIAGNOSIS — Z34.83 PRENATAL CARE, SUBSEQUENT PREGNANCY IN THIRD TRIMESTER: ICD-10-CM

## 2024-08-28 LAB
GLUCOSE UR STRIP-MCNC: NEGATIVE MG/DL
PROT UR STRIP-MCNC: ABNORMAL MG/DL

## 2024-08-28 NOTE — PROGRESS NOTES
OB follow up     Isabella Christiansen is a 34 y.o.  33w0d being seen today for her obstetrical visit.  Patient reports no complaints. Fetal movement: normal.  She has been doing very well at home and has had no contractions and no vaginal bleeding.    Her prenatal care is complicated by (and status): Prior  x 3 and complete previa.    Review of Systems  Cramping/contractions : Negative  Vaginal bleeding: Negative  Fetal movement is normal    /79   Wt 75.4 kg (166 lb 3.2 oz)   LMP 01/10/2024   BMI 29.44 kg/m²     FHT: 134 BPM   Uterine Size: size equals dates       Assessment    Diagnoses and all orders for this visit:    1. 33 weeks gestation of pregnancy (Primary)  -     POC Urinalysis Dipstick    2. Prenatal care, subsequent pregnancy in third trimester    3. History of     4. Placenta previa in third trimester        1) pregnancy at 33w0d         Plan    Reviewed this stage of pregnancy  Problem list updated   Follow up in 1 week  with biophysical profile.  Presently,  section is scheduled for 7:30 in the morning on 2024.  She has appointment with maternal-fetal medicine tomorrow.    Jeffery Cooper MD   2024  15:07 EDT

## 2024-08-29 ENCOUNTER — OFFICE VISIT (OUTPATIENT)
Dept: OBSTETRICS AND GYNECOLOGY | Facility: CLINIC | Age: 34
End: 2024-08-29
Payer: MEDICAID

## 2024-08-29 ENCOUNTER — HOSPITAL ENCOUNTER (OUTPATIENT)
Dept: ULTRASOUND IMAGING | Facility: HOSPITAL | Age: 34
Discharge: HOME | End: 2024-08-29
Admitting: OBSTETRICS & GYNECOLOGY
Payer: MEDICAID

## 2024-08-29 VITALS
HEART RATE: 100 BPM | BODY MASS INDEX: 29.62 KG/M2 | SYSTOLIC BLOOD PRESSURE: 121 MMHG | OXYGEN SATURATION: 99 % | DIASTOLIC BLOOD PRESSURE: 84 MMHG | HEIGHT: 63 IN | TEMPERATURE: 98.7 F | WEIGHT: 167.2 LBS

## 2024-08-29 DIAGNOSIS — O09.523 MULTIGRAVIDA OF ADVANCED MATERNAL AGE IN THIRD TRIMESTER: ICD-10-CM

## 2024-08-29 DIAGNOSIS — O44.03 PLACENTA PREVIA IN THIRD TRIMESTER: ICD-10-CM

## 2024-08-29 DIAGNOSIS — O44.03 PLACENTA PREVIA IN THIRD TRIMESTER: Primary | ICD-10-CM

## 2024-08-29 DIAGNOSIS — Z98.891 HISTORY OF C-SECTION: ICD-10-CM

## 2024-08-29 PROCEDURE — 76816 OB US FOLLOW-UP PER FETUS: CPT

## 2024-08-29 PROCEDURE — 76819 FETAL BIOPHYS PROFIL W/O NST: CPT

## 2024-08-29 PROCEDURE — 76817 TRANSVAGINAL US OBSTETRIC: CPT

## 2024-08-29 NOTE — PROGRESS NOTES
MATERNAL FETAL MEDICINE Consult Note    Dear Dr Jeffery Cooper MD:    Thank you for your kind referral of Isabella Christiansen.  As you know, she is a 34 y.o.   33w1d gestation (Estimated Date of Delivery: 10/16/24). This is a consult.      Her antepartum course is complicated by:  Known placenta previa with possible accreta  Bleeding at 29 weeks.    Aneuploidy Screening: low risk    HPI: Today, she denies headache, blurry vision, RUQ pain. No vaginal bleeding, no contractions.     Review of History:  Past Medical History:   Diagnosis Date    Allergy to ACE inhibitors 2016    Allergy to morphine 2016    Allergy to NSAIDs 2016    Asthma     Hypertension 2023     Past Surgical History:   Procedure Laterality Date    BREAST AUGMENTATION       SECTION       SECTION N/A 2022    Procedure:  SECTION REPEAT;  Surgeon: Jeffery Cooper MD;  Location: [facility] LABOR DELIVERY;  Service: Obstetrics/Gynecology;  Laterality: N/A;     SECTION      COSMETIC SURGERY  2006       Social History     Socioeconomic History    Marital status:    Tobacco Use    Smoking status: Every Day     Current packs/day: 1.00     Average packs/day: 1 pack/day for 4.0 years (4.0 ttl pk-yrs)     Types: Cigarettes    Smokeless tobacco: Never    Tobacco comments:     Previously smoking about 1 pack per day for about 5 years, currently smoking less than half pack daily; back to smoking a pack per day   Vaping Use    Vaping status: Never Used   Substance and Sexual Activity    Alcohol use: Not Currently    Drug use: Never    Sexual activity: Yes     Partners: Male     Birth control/protection: None     Family History   Problem Relation Age of Onset    Hypertension Mother     Dementia Mother     Stroke Mother 65    Cancer Mother         Lung    Heart disease Mother     Hyperlipidemia Mother     Diabetes Father     Hypertension Father     Hyperlipidemia Father     Stroke Father   "   Hyperlipidemia Sister       Allergies   Allergen Reactions    Lisinopril Other (See Comments)     shakes    Sulfamethoxazole-Trimethoprim Other (See Comments)     Migraine    Morphine Rash     Red and itching       Current Outpatient Medications on File Prior to Visit   Medication Sig Dispense Refill    Prenatal Vit-Fe Fumarate-FA (prenatal vitamin 27-0.8) 27-0.8 MG tablet tablet Take 1 tablet by mouth Daily. 90 tablet 1    Symbicort 80-4.5 MCG/ACT inhaler INHALE 2 PUFFS BY MOUTH TWICE DAILY 10.2 g 1     No current facility-administered medications on file prior to visit.        Past obstetric, gynecological, medical, surgical, family and social history reviewed.  Relevant lab work and imaging reviewed.    Review of systems  Constitutional:  denies fever, chills, malaise.   ENT/Mouth:  denies sore throat, tinnitus  Eyes: denies vision changes/pain  CV:  denies chest pain  Respiratory:  denies cough/SOB  GI:  denies N/V, diarrhea, abdominal pain.    :   denies dysuria  Skin:  denies lesions or pruritus   Neuro:  denies weakness, focal neurologic symptoms    Vitals:    08/29/24 1123   BP: 121/84   BP Location: Right arm   Patient Position: Sitting   Pulse: 100   Temp: 98.7 °F (37.1 °C)   TempSrc: Temporal   SpO2: 99%   Weight: 75.8 kg (167 lb 3.2 oz)   Height: 160 cm (63\")       PHYSICAL EXAM   GENERAL: Not in acute distress, AAOx3, pleasant  CARDIO: regular rate and rhythm  PULM: symmetric chest rise, speaking in complete sentences without difficulty  NEURO: awake, alert and oriented to person, place, and time  ABDOMINAL: No fundal tenderness, no rebound or guarding, gravid  EXTREMITIES: no bilateral lower extremity edema/tenderness  SKIN: Warm, well-perfused    ULTRASOUND   Please view full ultrasound note on Imaging tab in ViewPoint.  Cephalic presentation.  Posterior previa--definitely over internal os but does not appear to go over previous CS scar.   MAURILIO 23 cm, which is high normal.   EFW 2150 g (47%, AC " 56%)  BPP 8/8  Normal appearing follow up anatomy.   No evidence sonographically of an accreta--can not rule out small focal accreta on any ultrasound.      ASSESSMENT/COUNSELIN y.o.   33w1d gestation (Estimated Date of Delivery: 10/16/24).       -Pregnancy  [ X ] stable  [   ] improving [  ] worsening    Diagnoses and all orders for this visit:    1. Placenta previa in third trimester (Primary)    2. History of          Placenta Previa with possible accreta (not suspected based on US, some question on MRI):  A morbidly adherent placenta is a general term that includes placenta accreta, increta, and percreta.  The most important risk factor for placenta accreta is placenta previa after a prior  delivery. The abnormally implanted placenta is often diagnosed on prenatal sonographic evaluation of the placenta in a woman with risk factors for accreta (previa, previous  delivery), but may be an incidental finding.  Today, despite her high pretest probability of accreta (3 previous CS and a previa),  we are not seeing any sonographic findings of placenta accreta (loss of homogeneity, lakes, placenta lacunae).  She has a normal [place] layer when her placenta is scanned through.  I reviewed her previous imaging and today's imaging and did not see any evidence on US. Thankfully, it does appear her placenta is posterior and a previa--I do not suspect it goes over the previous scar, which decreases her risk of accreta somewhat.  I did review her MRI.  I see what the radiologist is talking about, but I feel overall, likely the areas of concern are angle of imaging issues and not true loss of placental interface--but it is impossible to tell for sure.    With that said, even with multiple US, a placenta accreta, especially a focal/small one, cannot always be ruled out on US or MRI.  Importantly, the false negative rate of MRI for this indication is probably around 25-30%, but, when  combined with US, that rate was only 5%.  In addition, MRI has the possibility to change the correct diagnosis about 30% of the time in one study.  Thus, I often will only get an MRI when there is suspicion on US.  This is not a perfect approach and does not totally remove the possibility of a placenta accreta.    Because of her high pretest probability and MRI findings, I would recommend a CS in the main OR and be ready for possible C-hyst.  Would recommend 2 large bore IV's, blood in the room and ready, and appropriate staff available if massive transfusion is needed.  I am hopeful this placenta will come out easily based on what I am seeing on US.  I discussed with Dr. Cooper--appreciate his care.  He is comfortable with managing a possible C-hyst and management at [facility] since we are hopeful this ends up not being an accreta--which I think is appropriate as long as there is preparation in case a C-hyst has to be performed.      Patient has already been counseled similarly by Dr. Cooper and is amenable to this plan.  She asked about extent of C-hyst (told her usually remove uterus and tubes and leave ovaries if safe +/- cervix depending on a lot of factors).  She also asked about if she would be awake--I told her that is up to Dr. Cooper and the anesthesia team--that sometimes if we are hopeful to not do a C-hyst, they will start with a spinal and pivot to GETA if a C-hyst is required--ultimately this will be up to the OR team/Dr. Cooper taking care of her that day.  I would recommend delivery 35-36 weeks for placenta previa with possible placenta accreta.      Her questions were answered.     Summary of Plan  -Serial growth ultrasounds every 4 weeks (by primary OB)   -Weekly ANFS at primary OB until delivery  -Recommend strongly considering CS in the main OR and be ready for possible C-hyst.  Would recommend 2 large bore IV's, blood products in the room (at least 4 prbcs, 4 pack platelets, and 4 ffp)--can  release if chyst not done or after case, and appropriate staff available if transfusion is needed.  -Recommend delivery at 35-36 weeks.  If she has another episode of bleeding in the meantime--would deliver at that time.      Follow-up: No follow up with MFM scheduled, but I am happy to see for follow up at request of primary obstetrician    Thank you for the consult and opportunity to care for this patient.  Please feel free to reach out with any questions or concerns.      I spent 55 minutes caring for this patient on this date of service. This time includes time spent by me in the following activities: preparing for the visit, reviewing tests, obtaining and/or reviewing a separately obtained history, performing a medically appropriate examination and/or evaluation, counseling and educating the patient/family/caregiver and independently interpreting results and communicating that information with the patient/family/caregiver with greater than 50% spent in counseling and coordination of care.       I spent 5 minutes on the separately reported service of US imaging not included in the time used to support the E/M service also reported today.      Elida Chowdary MD [facility]  Maternal Fetal Medicine-[facility]  Office: [phone]  [email or web address]

## 2024-08-29 NOTE — LETTER
2024     Jeffery Cooper MD  [address]  [facility] [address]  [place] [id number]    Patient: Isabella Christiansen   YOB: 1990   Date of Visit: 2024       Dear Jeffery Cooper MD,    Thank you for referring Isabella Christiansen to me for evaluation. Below is a copy of my consult note.    If you have questions, please do not hesitate to call me. I look forward to following Isabella along with you.         Sincerely,        Elida Chowdary MD      MATERNAL FETAL MEDICINE Consult Note    Dear Dr Jeffery Cooper MD:    Thank you for your kind referral of Isabella Christiansen.  As you know, she is a 34 y.o.   33w1d gestation (Estimated Date of Delivery: 10/16/24). This is a consult.      Her antepartum course is complicated by:  Known placenta previa with possible accreta  Bleeding at 29 weeks.    Aneuploidy Screening: low risk    HPI: Today, she denies headache, blurry vision, RUQ pain. No vaginal bleeding, no contractions.     Review of History:  Past Medical History:   Diagnosis Date   • Allergy to ACE inhibitors 2016   • Allergy to morphine 2016   • Allergy to NSAIDs 2016   • Asthma    • Hypertension 2023     Past Surgical History:   Procedure Laterality Date   • BREAST AUGMENTATION     •  SECTION     •  SECTION N/A 2022    Procedure:  SECTION REPEAT;  Surgeon: Jeffery Cooper MD;  Location: [facility] DELIVERY;  Service: Obstetrics/Gynecology;  Laterality: N/A;   •  SECTION     • COSMETIC SURGERY  2006       Social History     Socioeconomic History   • Marital status:    Tobacco Use   • Smoking status: Every Day     Current packs/day: 1.00     Average packs/day: 1 pack/day for 4.0 years (4.0 ttl pk-yrs)     Types: Cigarettes   • Smokeless tobacco: Never   • Tobacco comments:     Previously smoking about 1 pack per day for about 5 years, currently smoking less than half pack daily; back to smoking a pack per  "day   Vaping Use   • Vaping status: Never Used   Substance and Sexual Activity   • Alcohol use: Not Currently   • Drug use: Never   • Sexual activity: Yes     Partners: Male     Birth control/protection: None     Family History   Problem Relation Age of Onset   • Hypertension Mother    • Dementia Mother    • Stroke Mother 65   • Cancer Mother         Lung   • Heart disease Mother    • Hyperlipidemia Mother    • Diabetes Father    • Hypertension Father    • Hyperlipidemia Father    • Stroke Father    • Hyperlipidemia Sister       Allergies   Allergen Reactions   • Lisinopril Other (See Comments)     shakes   • Sulfamethoxazole-Trimethoprim Other (See Comments)     Migraine   • Morphine Rash     Red and itching       Current Outpatient Medications on File Prior to Visit   Medication Sig Dispense Refill   • Prenatal Vit-Fe Fumarate-FA (prenatal vitamin 27-0.8) 27-0.8 MG tablet tablet Take 1 tablet by mouth Daily. 90 tablet 1   • Symbicort 80-4.5 MCG/ACT inhaler INHALE 2 PUFFS BY MOUTH TWICE DAILY 10.2 g 1     No current facility-administered medications on file prior to visit.        Past obstetric, gynecological, medical, surgical, family and social history reviewed.  Relevant lab work and imaging reviewed.    Review of systems  Constitutional:  denies fever, chills, malaise.   ENT/Mouth:  denies sore throat, tinnitus  Eyes: denies vision changes/pain  CV:  denies chest pain  Respiratory:  denies cough/SOB  GI:  denies N/V, diarrhea, abdominal pain.    :   denies dysuria  Skin:  denies lesions or pruritus   Neuro:  denies weakness, focal neurologic symptoms    Vitals:    08/29/24 1123   BP: 121/84   BP Location: Right arm   Patient Position: Sitting   Pulse: 100   Temp: 98.7 °F (37.1 °C)   TempSrc: Temporal   SpO2: 99%   Weight: 75.8 kg (167 lb 3.2 oz)   Height: 160 cm (63\")       PHYSICAL EXAM   GENERAL: Not in acute distress, AAOx3, pleasant  CARDIO: regular rate and rhythm  PULM: symmetric chest rise, speaking in " complete sentences without difficulty  NEURO: awake, alert and oriented to person, place, and time  ABDOMINAL: No fundal tenderness, no rebound or guarding, gravid  EXTREMITIES: no bilateral lower extremity edema/tenderness  SKIN: Warm, well-perfused    ULTRASOUND   Please view full ultrasound note on Imaging tab in ViewPoint.  Cephalic presentation.  Posterior previa--definitely over internal os but does not appear to go over previous CS scar.   MAURILIO 23 cm, which is high normal.   EFW 2150 g (47%, AC 56%)  BPP   Normal appearing follow up anatomy.   No evidence sonographically of an accreta--can not rule out small focal accreta on any ultrasound.      ASSESSMENT/COUNSELIN y.o.   33w1d gestation (Estimated Date of Delivery: 10/16/24).       -Pregnancy  [ X ] stable  [   ] improving [  ] worsening    Diagnoses and all orders for this visit:    1. Placenta previa in third trimester (Primary)    2. History of          Placenta Previa with possible accreta (not suspected based on US, some question on MRI):  A morbidly adherent placenta is a general term that includes placenta accreta, increta, and percreta.  The most important risk factor for placenta accreta is placenta previa after a prior  delivery. The abnormally implanted placenta is often diagnosed on prenatal sonographic evaluation of the placenta in a woman with risk factors for accreta (previa, previous  delivery), but may be an incidental finding.  Today, despite her high pretest probability of accreta (3 previous CS and a previa),  we are not seeing any sonographic findings of placenta accreta (loss of homogeneity, lakes, placenta lacunae).  She has a normal [place] layer when her placenta is scanned through.  I reviewed her previous imaging and today's imaging and did not see any evidence on US. Thankfully, it does appear her placenta is posterior and a previa--I do not suspect it goes over the previous scar, which  decreases her risk of accreta somewhat.  I did review her MRI.  I see what the radiologist is talking about, but I feel overall, likely the areas of concern are angle of imaging issues and not true loss of placental interface--but it is impossible to tell for sure.    With that said, even with multiple US, a placenta accreta, especially a focal/small one, cannot always be ruled out on US or MRI.  Importantly, the false negative rate of MRI for this indication is probably around 25-30%, but, when combined with US, that rate was only 5%.  In addition, MRI has the possibility to change the correct diagnosis about 30% of the time in one study.  Thus, I often will only get an MRI when there is suspicion on US.  This is not a perfect approach and does not totally remove the possibility of a placenta accreta.    Because of her high pretest probability and MRI findings, I would recommend a CS in the main OR and be ready for possible C-hyst.  Would recommend 2 large bore IV's, blood in the room and ready, and appropriate staff available if massive transfusion is needed.  I am hopeful this placenta will come out easily based on what I am seeing on US.  I discussed with Dr. Cooper--appreciate his care.  He is comfortable with managing a possible C-hyst and management at [facility] since we are hopeful this ends up not being an accreta--which I think is appropriate as long as there is preparation in case a C-hyst has to be performed.      Patient has already been counseled similarly by Dr. Cooper and is amenable to this plan.  She asked about extent of C-hyst (told her usually remove uterus and tubes and leave ovaries if safe +/- cervix depending on a lot of factors).  She also asked about if she would be awake--I told her that is up to Dr. Cooper and the anesthesia team--that sometimes if we are hopeful to not do a C-hyst, they will start with a spinal and pivot to GETA if a C-hyst is required--ultimately this will be up to  the OR team/Dr. Cooper taking care of her that day.  I would recommend delivery 35-36 weeks for placenta previa with possible placenta accreta.      Her questions were answered.     Summary of Plan  -Serial growth ultrasounds every 4 weeks (by primary OB)   -Weekly ANFS at primary OB until delivery  -Recommend strongly considering CS in the main OR and be ready for possible C-hyst.  Would recommend 2 large bore IV's, blood products in the room (at least 4 prbcs, 4 pack platelets, and 4 ffp)--can release if chyst not done or after case, and appropriate staff available if transfusion is needed.  -Recommend delivery at 35-36 weeks.  If she has another episode of bleeding in the meantime--would deliver at that time.      Follow-up: No follow up with MFM scheduled, but I am happy to see for follow up at request of primary obstetrician    Thank you for the consult and opportunity to care for this patient.  Please feel free to reach out with any questions or concerns.      I spent 55 minutes caring for this patient on this date of service. This time includes time spent by me in the following activities: preparing for the visit, reviewing tests, obtaining and/or reviewing a separately obtained history, performing a medically appropriate examination and/or evaluation, counseling and educating the patient/family/caregiver and independently interpreting results and communicating that information with the patient/family/caregiver with greater than 50% spent in counseling and coordination of care.       I spent 5 minutes on the separately reported service of US imaging not included in the time used to support the E/M service also reported today.      Elida Chowdary MD [facility]  Maternal Fetal Medicine-[facility]  Office: [phone]  [email or web address]

## 2024-08-29 NOTE — PROGRESS NOTES
Pt reports that she is doing well and denies vaginal bleeding, cramping, or LOF at this time. Irregular cramping. Reports active fetal movement. Reviewed when to call OB office or present to L&D for evaluation with symptoms such as decreased fetal movement, vaginal bleeding, LOF or ctxs. Pt verbalized understanding. Denies HA, visual changes or epigastric pain. Denies any additional complaints at time of appointment. Next OB appointment scheduled for 9/6.    Vitals:    08/29/24 1123   BP: 121/84   Pulse: 100   Temp: 98.7 °F (37.1 °C)   SpO2: 99%

## 2024-08-30 ENCOUNTER — ANESTHESIA EVENT (OUTPATIENT)
Dept: LABOR AND DELIVERY | Facility: HOSPITAL | Age: 34
End: 2024-08-30

## 2024-09-05 ENCOUNTER — ROUTINE PRENATAL (OUTPATIENT)
Dept: OBSTETRICS AND GYNECOLOGY | Facility: CLINIC | Age: 34
End: 2024-09-05
Payer: MEDICAID

## 2024-09-05 VITALS — WEIGHT: 168.2 LBS | DIASTOLIC BLOOD PRESSURE: 82 MMHG | BODY MASS INDEX: 29.8 KG/M2 | SYSTOLIC BLOOD PRESSURE: 120 MMHG

## 2024-09-05 DIAGNOSIS — O44.03 PLACENTA PREVIA IN THIRD TRIMESTER: ICD-10-CM

## 2024-09-05 DIAGNOSIS — Z98.891 HISTORY OF C-SECTION: ICD-10-CM

## 2024-09-05 DIAGNOSIS — Z3A.34 34 WEEKS GESTATION OF PREGNANCY: Primary | ICD-10-CM

## 2024-09-05 DIAGNOSIS — Z34.83 PRENATAL CARE, SUBSEQUENT PREGNANCY IN THIRD TRIMESTER: ICD-10-CM

## 2024-09-05 LAB
BILIRUB BLD-MCNC: NEGATIVE MG/DL
GLUCOSE UR STRIP-MCNC: NEGATIVE MG/DL
KETONES UR QL: NEGATIVE
LEUKOCYTE EST, POC: NEGATIVE
NITRITE UR-MCNC: NEGATIVE MG/ML
PH UR: 7 [PH] (ref 5–8)
PROT UR STRIP-MCNC: NEGATIVE MG/DL
RBC # UR STRIP: NEGATIVE /UL
SP GR UR: 1.02 (ref 1–1.03)
UROBILINOGEN UR QL: NORMAL

## 2024-09-05 NOTE — PROGRESS NOTES
OB follow up     Isabella Christiansen is a 34 y.o.  34w1d being seen today for her obstetrical visit.  Patient reports no complaints. Fetal movement: normal.  Biophysical profile is 8/8.    Her prenatal care is complicated by (and status): Prior  and complete previa    Review of Systems  Cramping/contractions :  occasional  Vaginal bleeding: Negative  Fetal movement normal    /82   Wt 76.3 kg (168 lb 3.2 oz)   LMP 01/10/2024   BMI 29.80 kg/m²     FHT: 135 BPM   Uterine Size: size equals dates       Assessment    Diagnoses and all orders for this visit:    1. 34 weeks gestation of pregnancy (Primary)  -     POC Urinalysis Dipstick    2. Prenatal care, subsequent pregnancy in third trimester    3. History of     4. Placenta previa in third trimester        1) pregnancy at 34w1d         Plan    Reviewed this stage of pregnancy  Problem list updated   Follow up in 1 week.  Repeat  with tubal sterilization is planned for 2024.  We are in the process of moving it to the main OR to have a hysterectomy set available in the event that we encounter bleeding.  Is presently thought, after maternal-fetal medicine evaluation that this does not represent a placenta accreta, but we will have preparations made in advance nonetheless.    Jeffery Cooper MD   2024  09:48 EDT

## 2024-09-11 ENCOUNTER — ROUTINE PRENATAL (OUTPATIENT)
Dept: OBSTETRICS AND GYNECOLOGY | Facility: CLINIC | Age: 34
End: 2024-09-11
Payer: MEDICAID

## 2024-09-11 VITALS — DIASTOLIC BLOOD PRESSURE: 86 MMHG | WEIGHT: 166.2 LBS | BODY MASS INDEX: 29.44 KG/M2 | SYSTOLIC BLOOD PRESSURE: 124 MMHG

## 2024-09-11 DIAGNOSIS — Z98.891 HISTORY OF C-SECTION: ICD-10-CM

## 2024-09-11 DIAGNOSIS — O44.03 PLACENTA PREVIA IN THIRD TRIMESTER: ICD-10-CM

## 2024-09-11 DIAGNOSIS — Z34.90 ENCOUNTER FOR PRENATAL CARE: ICD-10-CM

## 2024-09-11 DIAGNOSIS — Z3A.35 35 WEEKS GESTATION OF PREGNANCY: Primary | ICD-10-CM

## 2024-09-11 LAB
GLUCOSE UR STRIP-MCNC: NEGATIVE MG/DL
PROT UR STRIP-MCNC: ABNORMAL MG/DL

## 2024-09-11 PROCEDURE — 99213 OFFICE O/P EST LOW 20 MIN: CPT | Performed by: OBSTETRICS & GYNECOLOGY

## 2024-09-11 NOTE — PROGRESS NOTES
OB follow up     Isabella Christiansen is a 34 y.o.  35w0d being seen today for her obstetrical visit.  Patient reports no complaints. Fetal movement: normal.    Biophysical profile today is      Her prenatal care is complicated by (and status): Prior  x 3, posterior placenta previa    Review of Systems  Cramping/contractions : Negative  Vaginal bleeding: Negative  Fetal movement is normal    /86   Wt 75.4 kg (166 lb 3.2 oz)   LMP 01/10/2024   BMI 29.44 kg/m²     FHT: 146 BPM   Uterine Size: size equals dates       Assessment    Diagnoses and all orders for this visit:    1. 35 weeks gestation of pregnancy (Primary)  -     POC Urinalysis Dipstick    2. Encounter for prenatal care    3. History of     4. Placenta previa in third trimester        1) pregnancy at 35w0d         Plan    Reviewed this stage of pregnancy  Problem list updated   Follow up in 1 week for scheduled  on 2024.    Jeffery Cooper MD   2024  15:57 EDT

## 2024-09-17 ENCOUNTER — TELEPHONE (OUTPATIENT)
Dept: OBSTETRICS AND GYNECOLOGY | Facility: CLINIC | Age: 34
End: 2024-09-17
Payer: MEDICAID

## 2024-09-17 ENCOUNTER — ANESTHESIA EVENT (OUTPATIENT)
Dept: PERIOP | Facility: HOSPITAL | Age: 34
End: 2024-09-17
Payer: MEDICAID

## 2024-09-17 RX ORDER — ACETAMINOPHEN 500 MG
1000 TABLET ORAL EVERY 6 HOURS PRN
COMMUNITY

## 2024-09-18 ENCOUNTER — ANESTHESIA (OUTPATIENT)
Dept: LABOR AND DELIVERY | Facility: HOSPITAL | Age: 34
End: 2024-09-18

## 2024-09-18 ENCOUNTER — ANESTHESIA (OUTPATIENT)
Dept: PERIOP | Facility: HOSPITAL | Age: 34
End: 2024-09-18
Payer: MEDICAID

## 2024-09-18 ENCOUNTER — HOSPITAL ENCOUNTER (INPATIENT)
Facility: HOSPITAL | Age: 34
LOS: 4 days | Discharge: HOME | End: 2024-09-22
Attending: OBSTETRICS & GYNECOLOGY | Admitting: OBSTETRICS & GYNECOLOGY
Payer: MEDICAID

## 2024-09-18 DIAGNOSIS — O44.03 PLACENTA PREVIA IN THIRD TRIMESTER: ICD-10-CM

## 2024-09-18 DIAGNOSIS — Z30.2 REQUEST FOR STERILIZATION: ICD-10-CM

## 2024-09-18 DIAGNOSIS — Z98.891 HISTORY OF C-SECTION: ICD-10-CM

## 2024-09-18 PROBLEM — Z34.90 PREGNANCY: Status: ACTIVE | Noted: 2024-09-18

## 2024-09-18 LAB
ABO GROUP BLD: NORMAL
ALBUMIN SERPL-MCNC: 3.7 G/DL (ref 3.5–5.2)
ALBUMIN/GLOB SERPL: 1.5 G/DL
ALP SERPL-CCNC: 195 U/L (ref 39–117)
ALT SERPL W P-5'-P-CCNC: 6 U/L (ref 1–33)
ANION GAP SERPL CALCULATED.3IONS-SCNC: 11 MMOL/L (ref 5–15)
AST SERPL-CCNC: 7 U/L (ref 1–32)
BASOPHILS # BLD AUTO: 0.04 10*3/MM3 (ref 0–0.2)
BASOPHILS NFR BLD AUTO: 0.3 % (ref 0–1.5)
BILIRUB SERPL-MCNC: <0.2 MG/DL (ref 0–1.2)
BLD GP AB SCN SERPL QL: NEGATIVE
BUN SERPL-MCNC: 2 MG/DL (ref 6–20)
BUN/CREAT SERPL: 4.7 (ref 7–25)
CALCIUM SPEC-SCNC: 9.2 MG/DL (ref 8.6–10.5)
CHLORIDE SERPL-SCNC: 104 MMOL/L (ref 98–107)
CO2 SERPL-SCNC: 20 MMOL/L (ref 22–29)
CREAT SERPL-MCNC: 0.43 MG/DL (ref 0.57–1)
DEPRECATED RDW RBC AUTO: 38.4 FL (ref 37–54)
EGFRCR SERPLBLD CKD-EPI 2021: 129.5 ML/MIN/1.73
EOSINOPHIL # BLD AUTO: 0.24 10*3/MM3 (ref 0–0.4)
EOSINOPHIL NFR BLD AUTO: 1.6 % (ref 0.3–6.2)
ERYTHROCYTE [DISTWIDTH] IN BLOOD BY AUTOMATED COUNT: 12 % (ref 12.3–15.4)
GLOBULIN UR ELPH-MCNC: 2.4 GM/DL
GLUCOSE SERPL-MCNC: 99 MG/DL (ref 65–99)
HCT VFR BLD AUTO: 36.7 % (ref 34–46.6)
HGB BLD-MCNC: 12.7 G/DL (ref 12–15.9)
IMM GRANULOCYTES # BLD AUTO: 0.29 10*3/MM3 (ref 0–0.05)
IMM GRANULOCYTES NFR BLD AUTO: 2 % (ref 0–0.5)
LYMPHOCYTES # BLD AUTO: 3.47 10*3/MM3 (ref 0.7–3.1)
LYMPHOCYTES NFR BLD AUTO: 23.5 % (ref 19.6–45.3)
MCH RBC QN AUTO: 30.4 PG (ref 26.6–33)
MCHC RBC AUTO-ENTMCNC: 34.6 G/DL (ref 31.5–35.7)
MCV RBC AUTO: 87.8 FL (ref 79–97)
MONOCYTES # BLD AUTO: 1.02 10*3/MM3 (ref 0.1–0.9)
MONOCYTES NFR BLD AUTO: 6.9 % (ref 5–12)
NEUTROPHILS NFR BLD AUTO: 65.7 % (ref 42.7–76)
NEUTROPHILS NFR BLD AUTO: 9.71 10*3/MM3 (ref 1.7–7)
NRBC BLD AUTO-RTO: 0 /100 WBC (ref 0–0.2)
PLATELET # BLD AUTO: 256 10*3/MM3 (ref 140–450)
PMV BLD AUTO: 10.3 FL (ref 6–12)
POTASSIUM SERPL-SCNC: 3.8 MMOL/L (ref 3.5–5.2)
PROT SERPL-MCNC: 6.1 G/DL (ref 6–8.5)
RBC # BLD AUTO: 4.18 10*6/MM3 (ref 3.77–5.28)
RH BLD: POSITIVE
SODIUM SERPL-SCNC: 135 MMOL/L (ref 136–145)
T&S EXPIRATION DATE: NORMAL
TREPONEMA PALLIDUM IGG+IGM AB [PRESENCE] IN SERUM OR PLASMA BY IMMUNOASSAY: NORMAL
WBC NRBC COR # BLD AUTO: 14.77 10*3/MM3 (ref 3.4–10.8)

## 2024-09-18 PROCEDURE — 80053 COMPREHEN METABOLIC PANEL: CPT | Performed by: OBSTETRICS & GYNECOLOGY

## 2024-09-18 PROCEDURE — 25010000002 EPINEPHRINE 1 MG/ML SOLUTION 30 ML VIAL: Performed by: OBSTETRICS & GYNECOLOGY

## 2024-09-18 PROCEDURE — S0260 H&P FOR SURGERY: HCPCS | Performed by: OBSTETRICS & GYNECOLOGY

## 2024-09-18 PROCEDURE — 58611 LIGATE OVIDUCT(S) ADD-ON: CPT | Performed by: OBSTETRICS & GYNECOLOGY

## 2024-09-18 PROCEDURE — 86850 RBC ANTIBODY SCREEN: CPT | Performed by: OBSTETRICS & GYNECOLOGY

## 2024-09-18 PROCEDURE — 86923 COMPATIBILITY TEST ELECTRIC: CPT

## 2024-09-18 PROCEDURE — 25010000002 MORPHINE PER 10 MG: Performed by: ANESTHESIOLOGY

## 2024-09-18 PROCEDURE — 0UB70ZZ EXCISION OF BILATERAL FALLOPIAN TUBES, OPEN APPROACH: ICD-10-PCS | Performed by: OBSTETRICS & GYNECOLOGY

## 2024-09-18 PROCEDURE — 25810000003 LACTATED RINGERS PER 1000 ML

## 2024-09-18 PROCEDURE — 86901 BLOOD TYPING SEROLOGIC RH(D): CPT | Performed by: OBSTETRICS & GYNECOLOGY

## 2024-09-18 PROCEDURE — 88302 TISSUE EXAM BY PATHOLOGIST: CPT | Performed by: OBSTETRICS & GYNECOLOGY

## 2024-09-18 PROCEDURE — 88307 TISSUE EXAM BY PATHOLOGIST: CPT

## 2024-09-18 PROCEDURE — 25010000002 KETOROLAC TROMETHAMINE PER 15 MG: Performed by: OBSTETRICS & GYNECOLOGY

## 2024-09-18 PROCEDURE — 25010000002 CEFAZOLIN PER 500 MG: Performed by: OBSTETRICS & GYNECOLOGY

## 2024-09-18 PROCEDURE — 86780 TREPONEMA PALLIDUM: CPT | Performed by: OBSTETRICS & GYNECOLOGY

## 2024-09-18 PROCEDURE — 25010000002 FENTANYL CITRATE (PF) 50 MCG/ML SOLUTION: Performed by: ANESTHESIOLOGY

## 2024-09-18 PROCEDURE — 85025 COMPLETE CBC W/AUTO DIFF WBC: CPT | Performed by: OBSTETRICS & GYNECOLOGY

## 2024-09-18 PROCEDURE — 25810000003 LACTATED RINGERS SOLUTION: Performed by: OBSTETRICS & GYNECOLOGY

## 2024-09-18 PROCEDURE — 25010000002 ONDANSETRON PER 1 MG: Performed by: ANESTHESIOLOGY

## 2024-09-18 PROCEDURE — 25010000002 HYDROMORPHONE PER 4 MG

## 2024-09-18 PROCEDURE — 25010000002 CLONIDINE PER 1 MG: Performed by: OBSTETRICS & GYNECOLOGY

## 2024-09-18 PROCEDURE — 25010000002 ONDANSETRON PER 1 MG

## 2024-09-18 PROCEDURE — 59515 CESAREAN DELIVERY: CPT | Performed by: OBSTETRICS & GYNECOLOGY

## 2024-09-18 PROCEDURE — 25010000002 ROPIVACAINE PER 1 MG: Performed by: OBSTETRICS & GYNECOLOGY

## 2024-09-18 PROCEDURE — 86900 BLOOD TYPING SEROLOGIC ABO: CPT | Performed by: OBSTETRICS & GYNECOLOGY

## 2024-09-18 PROCEDURE — 25010000002 BUPIVACAINE PF 0.75 % SOLUTION

## 2024-09-18 PROCEDURE — 59514 CESAREAN DELIVERY ONLY: CPT | Performed by: OBSTETRICS & GYNECOLOGY

## 2024-09-18 DEVICE — DEV CONTRL TISS STRATAFIX SPIRAL MNCRYL PLS PS2 3/0 30CM UD: Type: IMPLANTABLE DEVICE | Site: ABDOMEN | Status: FUNCTIONAL

## 2024-09-18 RX ORDER — KETOROLAC TROMETHAMINE 30 MG/ML
30 INJECTION, SOLUTION INTRAMUSCULAR; INTRAVENOUS ONCE
Status: COMPLETED | OUTPATIENT
Start: 2024-09-18 | End: 2024-09-18

## 2024-09-18 RX ORDER — SODIUM CHLORIDE 9 MG/ML
40 INJECTION, SOLUTION INTRAVENOUS AS NEEDED
Status: DISCONTINUED | OUTPATIENT
Start: 2024-09-18 | End: 2024-09-18 | Stop reason: HOSPADM

## 2024-09-18 RX ORDER — METHYLERGONOVINE MALEATE 0.2 MG/ML
200 INJECTION INTRAVENOUS ONCE AS NEEDED
Status: DISCONTINUED | OUTPATIENT
Start: 2024-09-18 | End: 2024-09-18 | Stop reason: HOSPADM

## 2024-09-18 RX ORDER — MISOPROSTOL 200 UG/1
800 TABLET ORAL ONCE AS NEEDED
Status: DISCONTINUED | OUTPATIENT
Start: 2024-09-18 | End: 2024-09-18 | Stop reason: HOSPADM

## 2024-09-18 RX ORDER — ACETAMINOPHEN 325 MG/1
650 TABLET ORAL EVERY 6 HOURS
Status: DISCONTINUED | OUTPATIENT
Start: 2024-09-19 | End: 2024-09-22 | Stop reason: HOSPADM

## 2024-09-18 RX ORDER — SODIUM CHLORIDE 0.9 % (FLUSH) 0.9 %
10 SYRINGE (ML) INJECTION EVERY 12 HOURS SCHEDULED
Status: DISCONTINUED | OUTPATIENT
Start: 2024-09-18 | End: 2024-09-18 | Stop reason: HOSPADM

## 2024-09-18 RX ORDER — BETAMETHASONE SODIUM PHOSPHATE AND BETAMETHASONE ACETATE 3; 3 MG/ML; MG/ML
12 INJECTION, SUSPENSION INTRA-ARTICULAR; INTRALESIONAL; INTRAMUSCULAR; SOFT TISSUE EVERY 24 HOURS
Status: DISCONTINUED | OUTPATIENT
Start: 2024-09-18 | End: 2024-09-18 | Stop reason: HOSPADM

## 2024-09-18 RX ORDER — DOCUSATE SODIUM 100 MG/1
100 CAPSULE, LIQUID FILLED ORAL 2 TIMES DAILY PRN
Status: DISCONTINUED | OUTPATIENT
Start: 2024-09-18 | End: 2024-09-22 | Stop reason: HOSPADM

## 2024-09-18 RX ORDER — OXYTOCIN/0.9 % SODIUM CHLORIDE 30/500 ML
999 PLASTIC BAG, INJECTION (ML) INTRAVENOUS ONCE
Status: COMPLETED | OUTPATIENT
Start: 2024-09-18 | End: 2024-09-18

## 2024-09-18 RX ORDER — DIPHENHYDRAMINE HYDROCHLORIDE 50 MG/ML
25 INJECTION INTRAMUSCULAR; INTRAVENOUS EVERY 4 HOURS PRN
Status: DISCONTINUED | OUTPATIENT
Start: 2024-09-18 | End: 2024-09-22 | Stop reason: HOSPADM

## 2024-09-18 RX ORDER — ACETAMINOPHEN 500 MG
1000 TABLET ORAL EVERY 6 HOURS
Status: DISPENSED | OUTPATIENT
Start: 2024-09-18 | End: 2024-09-19

## 2024-09-18 RX ORDER — SODIUM CHLORIDE, SODIUM LACTATE, POTASSIUM CHLORIDE, CALCIUM CHLORIDE 600; 310; 30; 20 MG/100ML; MG/100ML; MG/100ML; MG/100ML
125 INJECTION, SOLUTION INTRAVENOUS CONTINUOUS
Status: DISCONTINUED | OUTPATIENT
Start: 2024-09-18 | End: 2024-09-22 | Stop reason: HOSPADM

## 2024-09-18 RX ORDER — FENTANYL CITRATE 50 UG/ML
INJECTION, SOLUTION INTRAMUSCULAR; INTRAVENOUS
Status: COMPLETED | OUTPATIENT
Start: 2024-09-18 | End: 2024-09-18

## 2024-09-18 RX ORDER — CARBOPROST TROMETHAMINE 250 UG/ML
250 INJECTION, SOLUTION INTRAMUSCULAR
Status: DISCONTINUED | OUTPATIENT
Start: 2024-09-18 | End: 2024-09-18 | Stop reason: HOSPADM

## 2024-09-18 RX ORDER — OXYCODONE HYDROCHLORIDE 10 MG/1
10 TABLET ORAL EVERY 4 HOURS PRN
Status: DISCONTINUED | OUTPATIENT
Start: 2024-09-18 | End: 2024-09-22 | Stop reason: HOSPADM

## 2024-09-18 RX ORDER — SIMETHICONE 80 MG
80 TABLET,CHEWABLE ORAL 4 TIMES DAILY PRN
Status: DISCONTINUED | OUTPATIENT
Start: 2024-09-18 | End: 2024-09-22 | Stop reason: HOSPADM

## 2024-09-18 RX ORDER — BUPIVACAINE HYDROCHLORIDE 5 MG/ML
INJECTION, SOLUTION EPIDURAL; INTRACAUDAL AS NEEDED
Status: DISCONTINUED | OUTPATIENT
Start: 2024-09-18 | End: 2024-09-18

## 2024-09-18 RX ORDER — DEXMEDETOMIDINE HYDROCHLORIDE 100 UG/ML
INJECTION, SOLUTION INTRAVENOUS AS NEEDED
Status: DISCONTINUED | OUTPATIENT
Start: 2024-09-18 | End: 2024-09-18 | Stop reason: SURG

## 2024-09-18 RX ORDER — KETOROLAC TROMETHAMINE 30 MG/ML
30 INJECTION, SOLUTION INTRAMUSCULAR; INTRAVENOUS ONCE
Status: DISCONTINUED | OUTPATIENT
Start: 2024-09-18 | End: 2024-09-18 | Stop reason: HOSPADM

## 2024-09-18 RX ORDER — DEXTROSE MONOHYDRATE AND SODIUM CHLORIDE 5; .45 G/100ML; G/100ML
125 INJECTION, SOLUTION INTRAVENOUS CONTINUOUS
Status: DISCONTINUED | OUTPATIENT
Start: 2024-09-18 | End: 2024-09-22 | Stop reason: HOSPADM

## 2024-09-18 RX ORDER — SODIUM CHLORIDE 0.9 % (FLUSH) 0.9 %
10 SYRINGE (ML) INJECTION AS NEEDED
Status: DISCONTINUED | OUTPATIENT
Start: 2024-09-18 | End: 2024-09-18 | Stop reason: HOSPADM

## 2024-09-18 RX ORDER — ONDANSETRON 4 MG/1
4 TABLET, ORALLY DISINTEGRATING ORAL EVERY 6 HOURS PRN
Status: DISCONTINUED | OUTPATIENT
Start: 2024-09-18 | End: 2024-09-22 | Stop reason: HOSPADM

## 2024-09-18 RX ORDER — ONDANSETRON 2 MG/ML
4 INJECTION INTRAMUSCULAR; INTRAVENOUS ONCE AS NEEDED
Status: COMPLETED | OUTPATIENT
Start: 2024-09-18 | End: 2024-09-18

## 2024-09-18 RX ORDER — NALOXONE HCL 0.4 MG/ML
0.2 VIAL (ML) INJECTION
Status: DISCONTINUED | OUTPATIENT
Start: 2024-09-18 | End: 2024-09-22 | Stop reason: HOSPADM

## 2024-09-18 RX ORDER — CITRIC ACID/SODIUM CITRATE 334-500MG
30 SOLUTION, ORAL ORAL ONCE
Status: COMPLETED | OUTPATIENT
Start: 2024-09-18 | End: 2024-09-18

## 2024-09-18 RX ORDER — ONDANSETRON 2 MG/ML
4 INJECTION INTRAMUSCULAR; INTRAVENOUS EVERY 6 HOURS PRN
Status: DISCONTINUED | OUTPATIENT
Start: 2024-09-18 | End: 2024-09-22 | Stop reason: HOSPADM

## 2024-09-18 RX ORDER — OXYTOCIN/0.9 % SODIUM CHLORIDE 30/500 ML
125 PLASTIC BAG, INJECTION (ML) INTRAVENOUS ONCE AS NEEDED
Status: COMPLETED | OUTPATIENT
Start: 2024-09-18 | End: 2024-09-18

## 2024-09-18 RX ORDER — BUPIVACAINE HYDROCHLORIDE 7.5 MG/ML
INJECTION, SOLUTION EPIDURAL; RETROBULBAR AS NEEDED
Status: DISCONTINUED | OUTPATIENT
Start: 2024-09-18 | End: 2024-09-18 | Stop reason: SURG

## 2024-09-18 RX ORDER — FAMOTIDINE 10 MG/ML
20 INJECTION, SOLUTION INTRAVENOUS ONCE AS NEEDED
Status: COMPLETED | OUTPATIENT
Start: 2024-09-18 | End: 2024-09-18

## 2024-09-18 RX ORDER — ACETAMINOPHEN 325 MG/1
650 TABLET ORAL EVERY 4 HOURS PRN
Status: DISCONTINUED | OUTPATIENT
Start: 2024-09-18 | End: 2024-09-18

## 2024-09-18 RX ORDER — KETOROLAC TROMETHAMINE 15 MG/ML
15 INJECTION, SOLUTION INTRAMUSCULAR; INTRAVENOUS EVERY 6 HOURS
Status: DISPENSED | OUTPATIENT
Start: 2024-09-18 | End: 2024-09-19

## 2024-09-18 RX ORDER — ONDANSETRON 2 MG/ML
INJECTION INTRAMUSCULAR; INTRAVENOUS AS NEEDED
Status: DISCONTINUED | OUTPATIENT
Start: 2024-09-18 | End: 2024-09-18 | Stop reason: SURG

## 2024-09-18 RX ORDER — LIDOCAINE HYDROCHLORIDE 10 MG/ML
0.5 INJECTION, SOLUTION INFILTRATION; PERINEURAL ONCE AS NEEDED
Status: DISCONTINUED | OUTPATIENT
Start: 2024-09-18 | End: 2024-09-18 | Stop reason: HOSPADM

## 2024-09-18 RX ORDER — MORPHINE SULFATE 2 MG/ML
2 INJECTION, SOLUTION INTRAMUSCULAR; INTRAVENOUS
Status: ACTIVE | OUTPATIENT
Start: 2024-09-18 | End: 2024-09-18

## 2024-09-18 RX ORDER — SODIUM CHLORIDE, SODIUM LACTATE, POTASSIUM CHLORIDE, CALCIUM CHLORIDE 600; 310; 30; 20 MG/100ML; MG/100ML; MG/100ML; MG/100ML
INJECTION, SOLUTION INTRAVENOUS CONTINUOUS PRN
Status: DISCONTINUED | OUTPATIENT
Start: 2024-09-18 | End: 2024-09-18 | Stop reason: SURG

## 2024-09-18 RX ORDER — ACETAMINOPHEN 500 MG
1000 TABLET ORAL ONCE
Status: COMPLETED | OUTPATIENT
Start: 2024-09-18 | End: 2024-09-18

## 2024-09-18 RX ORDER — ERYTHROMYCIN 5 MG/G
OINTMENT OPHTHALMIC
Status: ACTIVE
Start: 2024-09-18 | End: 2024-09-18

## 2024-09-18 RX ORDER — PRENATAL VIT/IRON FUM/FOLIC AC 27MG-0.8MG
1 TABLET ORAL DAILY
Status: DISCONTINUED | OUTPATIENT
Start: 2024-09-18 | End: 2024-09-22 | Stop reason: HOSPADM

## 2024-09-18 RX ORDER — OXYCODONE HYDROCHLORIDE 5 MG/1
5 TABLET ORAL EVERY 4 HOURS PRN
Status: DISCONTINUED | OUTPATIENT
Start: 2024-09-18 | End: 2024-09-22 | Stop reason: HOSPADM

## 2024-09-18 RX ORDER — HYDROXYZINE HYDROCHLORIDE 50 MG/1
50 TABLET, FILM COATED ORAL EVERY 6 HOURS PRN
Status: DISCONTINUED | OUTPATIENT
Start: 2024-09-18 | End: 2024-09-22 | Stop reason: HOSPADM

## 2024-09-18 RX ORDER — DIPHENHYDRAMINE HCL 25 MG
25 CAPSULE ORAL EVERY 4 HOURS PRN
Status: DISCONTINUED | OUTPATIENT
Start: 2024-09-18 | End: 2024-09-22 | Stop reason: HOSPADM

## 2024-09-18 RX ORDER — OXYTOCIN/0.9 % SODIUM CHLORIDE 30/500 ML
250 PLASTIC BAG, INJECTION (ML) INTRAVENOUS CONTINUOUS
Status: DISPENSED | OUTPATIENT
Start: 2024-09-18 | End: 2024-09-18

## 2024-09-18 RX ORDER — TRANEXAMIC ACID 10 MG/ML
1000 INJECTION, SOLUTION INTRAVENOUS ONCE AS NEEDED
Status: DISCONTINUED | OUTPATIENT
Start: 2024-09-18 | End: 2024-09-18 | Stop reason: HOSPADM

## 2024-09-18 RX ORDER — DROPERIDOL 2.5 MG/ML
0.62 INJECTION, SOLUTION INTRAMUSCULAR; INTRAVENOUS
Status: DISCONTINUED | OUTPATIENT
Start: 2024-09-18 | End: 2024-09-22 | Stop reason: HOSPADM

## 2024-09-18 RX ORDER — SODIUM CHLORIDE 0.9 % (FLUSH) 0.9 %
3 SYRINGE (ML) INJECTION EVERY 12 HOURS SCHEDULED
Status: DISCONTINUED | OUTPATIENT
Start: 2024-09-18 | End: 2024-09-18 | Stop reason: HOSPADM

## 2024-09-18 RX ORDER — OXYTOCIN/0.9 % SODIUM CHLORIDE 30/500 ML
125 PLASTIC BAG, INJECTION (ML) INTRAVENOUS ONCE AS NEEDED
Status: DISCONTINUED | OUTPATIENT
Start: 2024-09-18 | End: 2024-09-22 | Stop reason: HOSPADM

## 2024-09-18 RX ORDER — IBUPROFEN 600 MG/1
600 TABLET, FILM COATED ORAL EVERY 6 HOURS
Status: DISCONTINUED | OUTPATIENT
Start: 2024-09-19 | End: 2024-09-22 | Stop reason: HOSPADM

## 2024-09-18 RX ORDER — PHYTONADIONE 1 MG/.5ML
INJECTION, EMULSION INTRAMUSCULAR; INTRAVENOUS; SUBCUTANEOUS
Status: ACTIVE
Start: 2024-09-18 | End: 2024-09-18

## 2024-09-18 RX ORDER — MORPHINE SULFATE 4 MG/ML
INJECTION, SOLUTION INTRAMUSCULAR; INTRAVENOUS
Status: COMPLETED | OUTPATIENT
Start: 2024-09-18 | End: 2024-09-18

## 2024-09-18 RX ORDER — HYDROMORPHONE HYDROCHLORIDE 1 MG/ML
0.5 INJECTION, SOLUTION INTRAMUSCULAR; INTRAVENOUS; SUBCUTANEOUS
Status: DISCONTINUED | OUTPATIENT
Start: 2024-09-18 | End: 2024-09-18 | Stop reason: HOSPADM

## 2024-09-18 RX ADMIN — KETOROLAC TROMETHAMINE 30 MG: 30 INJECTION, SOLUTION INTRAMUSCULAR at 10:12

## 2024-09-18 RX ADMIN — SODIUM CHLORIDE, POTASSIUM CHLORIDE, SODIUM LACTATE AND CALCIUM CHLORIDE 1000 ML: 600; 310; 30; 20 INJECTION, SOLUTION INTRAVENOUS at 06:31

## 2024-09-18 RX ADMIN — OXYCODONE HYDROCHLORIDE 10 MG: 10 TABLET ORAL at 20:29

## 2024-09-18 RX ADMIN — FAMOTIDINE 20 MG: 10 INJECTION INTRAVENOUS at 06:47

## 2024-09-18 RX ADMIN — ONDANSETRON 4 MG: 2 INJECTION, SOLUTION INTRAMUSCULAR; INTRAVENOUS at 06:47

## 2024-09-18 RX ADMIN — MORPHINE SULFATE 100 MCG: 4 INJECTION, SOLUTION INTRAMUSCULAR; INTRAVENOUS at 07:56

## 2024-09-18 RX ADMIN — SODIUM CHLORIDE 2000 MG: 900 INJECTION INTRAVENOUS at 07:25

## 2024-09-18 RX ADMIN — BUPIVACAINE HYDROCHLORIDE 1.6 ML: 7.5 INJECTION, SOLUTION EPIDURAL; RETROBULBAR at 07:56

## 2024-09-18 RX ADMIN — KETOROLAC TROMETHAMINE 15 MG: 15 INJECTION, SOLUTION INTRAMUSCULAR; INTRAVENOUS at 22:07

## 2024-09-18 RX ADMIN — DEXMEDETOMIDINE HYDROCHLORIDE 5 MCG: 100 INJECTION, SOLUTION, CONCENTRATE INTRAVENOUS at 08:08

## 2024-09-18 RX ADMIN — ACETAMINOPHEN 1000 MG: 500 TABLET ORAL at 07:21

## 2024-09-18 RX ADMIN — KETOROLAC TROMETHAMINE 15 MG: 15 INJECTION, SOLUTION INTRAMUSCULAR; INTRAVENOUS at 16:10

## 2024-09-18 RX ADMIN — SODIUM CHLORIDE, SODIUM LACTATE, POTASSIUM CHLORIDE, AND CALCIUM CHLORIDE: 600; 310; 30; 20 INJECTION, SOLUTION INTRAVENOUS at 07:40

## 2024-09-18 RX ADMIN — OXYCODONE HYDROCHLORIDE 10 MG: 10 TABLET ORAL at 16:11

## 2024-09-18 RX ADMIN — HYDROMORPHONE HYDROCHLORIDE 0.5 MG: 1 INJECTION, SOLUTION INTRAMUSCULAR; INTRAVENOUS; SUBCUTANEOUS at 11:10

## 2024-09-18 RX ADMIN — OXYCODONE HYDROCHLORIDE 10 MG: 10 TABLET ORAL at 12:01

## 2024-09-18 RX ADMIN — Medication 999 ML/HR: at 08:18

## 2024-09-18 RX ADMIN — ONDANSETRON 4 MG: 2 INJECTION, SOLUTION INTRAMUSCULAR; INTRAVENOUS at 13:32

## 2024-09-18 RX ADMIN — ACETAMINOPHEN 1000 MG: 500 TABLET ORAL at 19:05

## 2024-09-18 RX ADMIN — CLONIDINE HYDROCHLORIDE 100 ML: 0.1 INJECTION, SOLUTION EPIDURAL at 08:40

## 2024-09-18 RX ADMIN — SODIUM CITRATE AND CITRIC ACID MONOHYDRATE 30 ML: 334; 500 SOLUTION ORAL at 07:22

## 2024-09-18 RX ADMIN — FENTANYL CITRATE 15 MCG: 50 INJECTION, SOLUTION INTRAMUSCULAR; INTRAVENOUS at 07:56

## 2024-09-18 RX ADMIN — SODIUM CHLORIDE, SODIUM LACTATE, POTASSIUM CHLORIDE, AND CALCIUM CHLORIDE: 600; 310; 30; 20 INJECTION, SOLUTION INTRAVENOUS at 08:31

## 2024-09-18 RX ADMIN — ONDANSETRON 4 MG: 2 INJECTION INTRAMUSCULAR; INTRAVENOUS at 08:33

## 2024-09-18 RX ADMIN — DEXMEDETOMIDINE HYDROCHLORIDE 5 MCG: 100 INJECTION, SOLUTION, CONCENTRATE INTRAVENOUS at 08:12

## 2024-09-18 RX ADMIN — DEXMEDETOMIDINE HYDROCHLORIDE 5 MCG: 100 INJECTION, SOLUTION, CONCENTRATE INTRAVENOUS at 08:20

## 2024-09-18 RX ADMIN — Medication 125 ML/HR: at 09:34

## 2024-09-18 RX ADMIN — ACETAMINOPHEN 1000 MG: 500 TABLET ORAL at 13:11

## 2024-09-18 NOTE — OP NOTE
"[facility]   Section Operative Note    Pre-Operative Dx:   1.  IUP at Gestational Age: 36w0d  weeks    2.  Prior  x 3 with complete posterior placenta previa   Prior C/S    Postoperative dx:    1.  Same     Procedure: Procedure(s):   SECTION REPEAT WITH SALPINGECTOMY     Surgeon/Assistant: Surgeon(s):  Jeffery Cooper MD Lebder, Tristan Herrera MD, as first assistant was necessary for adequate tissue retraction and tissue exposure for hemostatic purposes as well as expert judgment from his side of the table.         Anesthesia:  Anesthesiologist: Spinal  Anesthesiologist: Kerry Tovar MD  CRNA: Aleena Prajapati CRNA     EBL: 800 cc  mls.          QBL:    Quantitative Blood Loss (mL): 674 mL (24 0915)     IV Fluids: . mls.   UOP: . mls.    I/O this shift:  In: 1447 [I.V.:1447]  Out: 974 [Urine:300; Blood:674]   Antibiotics: cefazolin (Ancef)     Infant:      Name:  .         Gender: male  infant    Weight: 2540 g (5 lb 9.6 oz)     Apgars: 8  @ 1 minute /     9  @ 5 minutes    Cord gases: Venous:  No results found for: \"PHCVEN\", \"BECVEN\"     Arterial:  No results found for: \"PHCART\", \"BECART\"     Indication for C/Section:  Prior C/S         Priority for C/Section:  routine      Procedure Details:   Patient was in the main OR room 30.  Adequate spinal anesthetic administered.  She was prepped and draped in usual manner and a Nolasco catheter was placed.  A timeout was performed and antibiotics have been given.  A Pfannenstiel incision was made down to the fascia, fascia was incised bilaterally and undermined superiorly and inferiorly.  Rectus muscle is divided in the midline and peritoneal cavity is entered without difficulty.  Bladder flap is created.  Lower uterine segment is intact but very thin.  There is no excessive vascularity anteriorly.  Lower uterine segment is scored in a transverse manner and amniotic fluid is clear and the incision is extended with bandage " scissors.  Head is elevated through the incision and delivery occurs with gentle downward traction and fundal pressure.  30 seconds transpires prior to cord clamping.  Cord blood is obtained.  We massaged the upper portion of the uterus and the placenta separates very easily with no resistance or any concern for a placenta accreta.  There was 1 area in the lower uterine segment posteriorly that was bleeding in bed that was controlled with a single figure-of-eight suture of 0 Vicryl.  The cervix was about 1 cm dilated.  There was no further bleeding suspicious for accreta spectrum and the uterine incision was then closed in a single layer with 0 Vicryl.  We had good hemostasis and then turned our attention to the fallopian tubes where the left fallopian tube was pulled forward elevated to its fimbriated end and then sealed using the Enseal device through the mesosalpinx attachments and the proximal tubal segment and then excised.  The exact procedure was carried out on the right fallopian tube.  Good hemostasis continued good uterine tone continued we reinspected the lower uterine segment and hemostasis was adequate.  We then reapproximated the rectus muscle with interrupted 0 chromic.  0 Vicryl was used to close the fascia in a running noninterlocking fashion.  Subcu tissue was irrigated and a bupivacaine cocktail is placed beneath the fascia.  Skin is closed with 3 oh STRATAFIX suture in a subcuticular fashion.      Complications:   None      Disposition:   Mother to Mother Baby/Postpartum  in stable condition currently.   Baby to remains with mom  in stable condition currently.       Jeffery Cooper MD  9/18/2024  11:18 EDT

## 2024-09-18 NOTE — H&P
[facility]  Obstetric History and Physical    Chief Complaint   Patient presents with    Scheduled      Repeat c/s. Complete previa.       Subjective     Patient is a 34 y.o. female  currently at 36w0d, who presents with complete posterior placenta previa after having had 3 prior  sections.  She desires permanent sterilization.  She has had thorough evaluation and assessment with maternal-fetal medicine.  There is a very low likelihood of placenta accreta spectrum.  Nonetheless, it was felt best to have a set up in the main OR in the event that evidence would lead to an abdominal hysterectomy.  The patient understands the nature of the delivery plans and the possible removal of the uterus.  She is also already planned to have tubal sterilization and consent has been signed.  She was admitted to the hospital at about 29 weeks with a bleed episode and received steroids and has not had any bleeding since that time..    Her prenatal care is complicated by  prior   desires repeat .  She desires permanent sterilization. her previous obstetric/gynecological history is noted for  3 prior  sections .    The following portions of the patients history were reviewed and updated as appropriate: current medications, allergies, past medical history, past surgical history, past family history, past social history, and problem list .       Prenatal Information:  Prenatal Results       Initial Prenatal Labs       Test Value Reference Range Date Time    Hemoglobin  14.5 g/dL 11.1 - 15.9 24 1005    Hematocrit  43.4 % 34.0 - 46.6 24 1005    Platelets  271 x10E3/uL 150 - 450 24 1005    Rubella IgG  9.32 index Immune >0.99 24 1005    Hepatitis B SAg  Negative  Negative 24 1005    Hepatitis C Ab  Non Reactive  Non Reactive 24 1005    RPR  Non Reactive  Non Reactive 24 1005    T. Pallidum Ab   Non-Reactive  Non-Reactive 24 0676        Non-Reactive  Non-Reactive 08/04/24 0304       Non Reactive  Non Reactive 07/24/24 1201    ABO  AB   09/18/24 0627    Rh  Positive   09/18/24 0627    Antibody Screen  Negative  Negative 03/20/24 1005    HIV  Non Reactive  Non Reactive 03/20/24 1005    Urine Culture  Final report   03/13/24 1531    Gonorrhea  Negative  Negative 03/13/24 1533    Chlamydia  Negative  Negative 03/13/24 1533    TSH        HgB A1c         Varicella IgG        Hemoglobinopathy Fractionation        Hemoglobinopathy (genetic testing)        Cystic fibrosis                   Fetal testing        Test Value Reference Range Date Time    NIPT        MSAFP        AFP-4                  2nd and 3rd Trimester       Test Value Reference Range Date Time    Hemoglobin (repeated)  12.7 g/dL 12.0 - 15.9 09/18/24 0627       10.7 g/dL 12.0 - 15.9 08/05/24 0422       11.5 g/dL 12.0 - 15.9 08/04/24 1019       11.8 g/dL 12.0 - 15.9 08/04/24 0304       12.3 g/dL 11.1 - 15.9 07/24/24 1201    Hematocrit (repeated)  36.7 % 34.0 - 46.6 09/18/24 0627       32.8 % 34.0 - 46.6 08/05/24 0422       34.9 % 34.0 - 46.6 08/04/24 1019       35.6 % 34.0 - 46.6 08/04/24 0304       36.8 % 34.0 - 46.6 07/24/24 1201    Platelets   256 10*3/mm3 140 - 450 09/18/24 0627       208 10*3/mm3 140 - 450 08/05/24 0422       240 10*3/mm3 140 - 450 08/04/24 1019       250 10*3/mm3 140 - 450 08/04/24 0304       271 x10E3/uL 150 - 450 03/20/24 1005    1 hour GTT   106 mg/dL 70 - 139 07/24/24 1201    Antibody Screen (repeated)  Negative   09/18/24 0627       Negative   08/04/24 0304    3rd TM syphilis scrn (repeated)  RPR         3rd TM syphilis scrn (repeated) TP-Ab  Non-Reactive  Non-Reactive 09/18/24 0627       Non-Reactive  Non-Reactive 08/04/24 0304       Non Reactive  Non Reactive 07/24/24 1201    3rd TM syphilis screen TB-Ab (FTA)  Non-Reactive  Non-Reactive 09/18/24 0627       Non-Reactive  Non-Reactive 08/04/24 0304       Non Reactive  Non Reactive 07/24/24 1201    Syphilis cascade  test TP-Ab (EIA)        Syphilis cascade TPPA        GTT Fasting        GTT 1 Hr        GTT 2 Hr        GTT 3 Hr        Group B Strep                  Other testing        Test Value Reference Range Date Time    Parvo IgG         CMV IgG                   Drug Screening       Test Value Reference Range Date Time    Amphetamine Screen  Negative ng/mL Jtjjuc=5220 03/13/24 1529    Barbiturate Screen  Negative ng/mL Ptrjkl=836 03/13/24 1529    Benzodiazepine Screen  Negative ng/mL Mszwjq=310 03/13/24 1529    Methadone Screen  Negative ng/mL Uvfjxc=377 03/13/24 1529    Phencyclidine Screen  Negative ng/mL Cutoff=25 03/13/24 1529    Opiates Screen  Negative ng/mL Sjcjev=237 03/13/24 1529    THC Screen  Negative ng/mL Cutoff=50 03/13/24 1529    Cocaine Screen  Negative ng/mL Idzrbk=857 03/13/24 1529    Propoxyphene Screen  Negative ng/mL Vjnpav=050 03/13/24 1529    Buprenorphine Screen        Methamphetamine Screen        Oxycodone Screen        Tricyclic Antidepressants Screen                  Legend    ^: Historical                          External Prenatal Results       Pregnancy Outside Results - Transcribed From Office Records - See Scanned Records For Details       Test Value Date Time    ABO  AB  09/18/24 0627    Rh  Positive  09/18/24 0627    Antibody Screen  Negative  09/18/24 0627       Negative  08/04/24 0304       Negative  03/20/24 1005    Varicella IgG       Rubella  9.32 index 03/20/24 1005    Hgb  12.7 g/dL 09/18/24 0627       10.7 g/dL 08/05/24 0422       11.5 g/dL 08/04/24 1019       11.8 g/dL 08/04/24 0304       12.3 g/dL 07/24/24 1201       14.5 g/dL 03/20/24 1005    Hct  36.7 % 09/18/24 0627       32.8 % 08/05/24 0422       34.9 % 08/04/24 1019       35.6 % 08/04/24 0304       36.8 % 07/24/24 1201       43.4 % 03/20/24 1005    HgB A1c        1h GTT  106 mg/dL 07/24/24 1201    3h GTT Fasting       3h GTT 1 hour       3h GTT 2 hour       3h GTT 3 hour        Gonorrhea (discrete)  Negative  03/13/24 1533     Chlamydia (discrete)  Negative  24 1533    RPR  Non Reactive  24 1005    Syphils cascade: TP-Ab (FTA)  Non-Reactive  24 0627       Non-Reactive  24 0304       Non Reactive  24 1201    TP-Ab  Non-Reactive  24 0627       Non-Reactive  24 0304       Non Reactive  24 1201    TP-Ab (EIA)       TPPA       HBsAg  Negative  24 1005    Herpes Simplex Virus PCR       Herpes Simplex VIrus Culture       HIV  Non Reactive  24 1005    Hep C RNA Quant PCR       Hep C Antibody  Non Reactive  24 1005    AFP       NIPT       Cystic Fibroisis        Group B Strep       GBS Susceptibility to Clindamycin       GBS Susceptibility to Erythromycin       Fetal Fibronectin       Genetic Testing, Maternal Blood                 Drug Screening       Test Value Date Time    Urine Drug Screen       Amphetamine Screen  Negative ng/mL 24 1529    Barbiturate Screen  Negative ng/mL 24 1529    Benzodiazepine Screen  Negative ng/mL 24 1529    Methadone Screen  Negative ng/mL 24 1529    Phencyclidine Screen  Negative ng/mL 24 1529    Opiates Screen       THC Screen       Cocaine Screen       Propoxyphene Screen  Negative ng/mL 24 1529    Buprenorphine Screen       Methamphetamine Screen       Oxycodone Screen       Tricyclic Antidepressants Screen                 Legend    ^: Historical                             Past OB History:     OB History    Para Term  AB Living   5 3 1 0 1 3   SAB IAB Ectopic Molar Multiple Live Births   1 0 0 0 0 3      # Outcome Date GA Lbr Lemuel/2nd Weight Sex Type Anes PTL Lv   5 Current            4 Term 22 39w0d  2945 g (6 lb 7.9 oz) F CS-LTranv Spinal N ROSENDO      Birth Comments: Shahidaa 3      Name: BLACKDI      Apgar1: 8  Apgar5: 9   3 SAB 2019           2 Para 01/22/10   2608 g (5 lb 12 oz) M CS-Unspec   ROSENDO   1 Para 07   3204 g (7 lb 1 oz) F CS-Unspec   ROSENDO       Past Medical  History: Past Medical History:   Diagnosis Date    Asthma     History of  section     X4    Hypertension 2023    Placenta accreta 2024    5TH CHILD      Past Surgical History Past Surgical History:   Procedure Laterality Date    BREAST AUGMENTATION       SECTION  2010     SECTION N/A 2022    Procedure:  SECTION REPEAT;  Surgeon: Jeffery Cooper MD;  Location: [facility] LABOR DELIVERY;  Service: Obstetrics/Gynecology;  Laterality: N/A;     SECTION      COSMETIC SURGERY  2006      Family History: Family History   Problem Relation Age of Onset    Hypertension Mother     Dementia Mother     Stroke Mother 65    Cancer Mother         Lung    Heart disease Mother     Hyperlipidemia Mother     Diabetes Father     Hypertension Father     Hyperlipidemia Father     Stroke Father     Hyperlipidemia Sister     Malig Hyperthermia Neg Hx       Social History:  reports that she has been smoking cigarettes. She has a 4 pack-year smoking history. She has never used smokeless tobacco.   reports that she does not currently use alcohol.   reports no history of drug use.        General ROS: Pertinent items are noted in HPI, all other systems reviewed and negative    Objective       Vital Signs Range for the last 24 hours  Temperature: Temp:  [98.5 °F (36.9 °C)] 98.5 °F (36.9 °C)   Temp Source: Temp src: Oral   BP: BP: (135)/(99) 135/99   Pulse: Heart Rate:  [118] 118   Respirations: Resp:  [17] 17   SPO2:     O2 Amount (l/min):     O2 Devices     Weight: Weight:  [76.3 kg (168 lb 3.2 oz)] 76.3 kg (168 lb 3.2 oz)     Physical Examination: General appearance - alert, well appearing, and in no distress and oriented to person, place, and time  Mental status - alert, oriented to person, place, and time, normal mood, behavior, speech, dress, motor activity, and thought processes  Heart - normal rate, regular rhythm, normal S1, S2, no murmurs, rubs, clicks or gallops  Abdomen - soft,  "nontender, nondistended, no masses or organomegaly  Gravid with fundal height consistent with 36 weeks status.  Extremities - peripheral pulses normal, no pedal edema, no clubbing or cyanosis    Presentation: Cephalic on last ultrasound   Cervix: Exam by:     Dilation:     Effacement:     Station:         Fetal Heart Rate Assessment   Method:     Beats/min:     Baseline:     Variability:     Accels:     Decels:     Tracing Category:       Uterine Assessment   Method:     Frequency (min):     Ctx Count in 10 min:     Duration:     Intensity:     Intensity by IUPC:     Resting Tone:     Resting Tone by IUPC:     Wiseman Units:       Laboratory Results: Hemoglobin 12.7  Radiology Review: Growth ultrasound on 2024:   Previous obstetric ultrasound is the comparison.  Today's study shows cephalic presentation posterior complete placenta previa is noted with no obvious evidence of placenta accreta spectrum..  Fetal heart rate is 130 bpm.  Normal amniotic fluid volume.  Fetal growth on today's ultrasound is consistent with 60th percentile with abdominal circumference at 73rd percentile for 31 weeks 0 days.     Other Studies: .    Assessment & Plan       History of     Placenta previa in third trimester    Pregnancy        Assessment:  1.  Intrauterine pregnancy at 36w0d gestation with reactive, reassuring fetal status.    2.  Prior  x 3 with posterior complete placenta previa.  Desired permanent sterilization  3.  Obstetrical history significant for  3 prior  section .  4.  GBS status: No results found for: \"STREPGPB\"    Plan:  1.  with Tubal scheduled, with possible progression to  hysterectomy.  2. Plan of care has been reviewed with patient and she understands the plan  3.  Risks, benefits of treatment plan have been discussed.  4.  All questions have been answered.  5.  .      Jeffery Cooper MD  2024  07:30 EDT    "

## 2024-09-18 NOTE — PLAN OF CARE
Problem: Adult Inpatient Plan of Care  Goal: Plan of Care Review  Outcome: Ongoing, Progressing  Flowsheets (Taken 2024 0958)  Progress: improving  Plan of Care Reviewed With: patient  Outcome Evaluation: s/p c/s, pain and bleeding controlled. Pt will be updated as needed on infant in transitional NBN.  Goal: Patient-Specific Goal (Individualized)  Outcome: Ongoing, Progressing  Goal: Absence of Hospital-Acquired Illness or Injury  Outcome: Ongoing, Progressing  Goal: Optimal Comfort and Wellbeing  Outcome: Ongoing, Progressing  Goal: Readiness for Transition of Care  Outcome: Ongoing, Progressing     Problem: Device-Related Complication Risk (Anesthesia/Analgesia, Neuraxial)  Goal: Safe Infusion Delivery Completion  Outcome: Ongoing, Progressing     Problem: Infection (Anesthesia/Analgesia, Neuraxial)  Goal: Absence of Infection Signs and Symptoms  Outcome: Ongoing, Progressing     Problem: Nausea and Vomiting (Anesthesia/Analgesia, Neuraxial)  Goal: Nausea and Vomiting Relief  Outcome: Ongoing, Progressing     Problem: Pain (Anesthesia/Analgesia, Neuraxial)  Goal: Effective Pain Control  Outcome: Ongoing, Progressing     Problem: Respiratory Compromise (Anesthesia/Analgesia, Neuraxial)  Goal: Effective Oxygenation and Ventilation  Outcome: Ongoing, Progressing     Problem: Sensorimotor Impairment (Anesthesia/Analgesia, Neuraxial)  Goal: Baseline Motor Function  Outcome: Ongoing, Progressing     Problem: Urinary Retention (Anesthesia/Analgesia, Neuraxial)  Goal: Effective Urinary Elimination  Outcome: Ongoing, Progressing     Problem:  Fall Injury Risk  Goal: Absence of Fall, Infant Drop and Related Injury  Outcome: Ongoing, Progressing     Problem: Skin Injury Risk Increased  Goal: Skin Health and Integrity  Outcome: Ongoing, Progressing   Goal Outcome Evaluation:  Plan of Care Reviewed With: patient        Progress: improving  Outcome Evaluation: s/p c/s, pain and bleeding controlled. Pt will be  updated as needed on infant in transitional NBN.

## 2024-09-19 LAB
BASOPHILS # BLD AUTO: 0.03 10*3/MM3 (ref 0–0.2)
BASOPHILS NFR BLD AUTO: 0.3 % (ref 0–1.5)
CYTO UR: NORMAL
DEPRECATED RDW RBC AUTO: 38.6 FL (ref 37–54)
EOSINOPHIL # BLD AUTO: 0.39 10*3/MM3 (ref 0–0.4)
EOSINOPHIL NFR BLD AUTO: 3.5 % (ref 0.3–6.2)
ERYTHROCYTE [DISTWIDTH] IN BLOOD BY AUTOMATED COUNT: 11.9 % (ref 12.3–15.4)
HCT VFR BLD AUTO: 28.2 % (ref 34–46.6)
HGB BLD-MCNC: 9.6 G/DL (ref 12–15.9)
IMM GRANULOCYTES # BLD AUTO: 0.12 10*3/MM3 (ref 0–0.05)
IMM GRANULOCYTES NFR BLD AUTO: 1.1 % (ref 0–0.5)
LAB AP CASE REPORT: NORMAL
LYMPHOCYTES # BLD AUTO: 2.6 10*3/MM3 (ref 0.7–3.1)
LYMPHOCYTES NFR BLD AUTO: 23.5 % (ref 19.6–45.3)
MCH RBC QN AUTO: 30.6 PG (ref 26.6–33)
MCHC RBC AUTO-ENTMCNC: 34 G/DL (ref 31.5–35.7)
MCV RBC AUTO: 89.8 FL (ref 79–97)
MONOCYTES # BLD AUTO: 0.72 10*3/MM3 (ref 0.1–0.9)
MONOCYTES NFR BLD AUTO: 6.5 % (ref 5–12)
NEUTROPHILS NFR BLD AUTO: 65.1 % (ref 42.7–76)
NEUTROPHILS NFR BLD AUTO: 7.19 10*3/MM3 (ref 1.7–7)
NRBC BLD AUTO-RTO: 0 /100 WBC (ref 0–0.2)
PATH REPORT.FINAL DX SPEC: NORMAL
PATH REPORT.GROSS SPEC: NORMAL
PLATELET # BLD AUTO: 203 10*3/MM3 (ref 140–450)
PMV BLD AUTO: 10.7 FL (ref 6–12)
RBC # BLD AUTO: 3.14 10*6/MM3 (ref 3.77–5.28)
WBC NRBC COR # BLD AUTO: 11.05 10*3/MM3 (ref 3.4–10.8)

## 2024-09-19 PROCEDURE — 0503F POSTPARTUM CARE VISIT: CPT

## 2024-09-19 PROCEDURE — 85025 COMPLETE CBC W/AUTO DIFF WBC: CPT | Performed by: OBSTETRICS & GYNECOLOGY

## 2024-09-19 RX ADMIN — IBUPROFEN 600 MG: 600 TABLET, FILM COATED ORAL at 16:21

## 2024-09-19 RX ADMIN — OXYCODONE HYDROCHLORIDE 5 MG: 5 TABLET ORAL at 06:07

## 2024-09-19 RX ADMIN — Medication 1 TABLET: at 11:46

## 2024-09-19 RX ADMIN — OXYCODONE HYDROCHLORIDE 5 MG: 5 TABLET ORAL at 01:03

## 2024-09-19 RX ADMIN — OXYCODONE HYDROCHLORIDE 5 MG: 5 TABLET ORAL at 20:47

## 2024-09-19 RX ADMIN — OXYCODONE HYDROCHLORIDE 5 MG: 5 TABLET ORAL at 11:46

## 2024-09-19 RX ADMIN — OXYCODONE HYDROCHLORIDE 5 MG: 5 TABLET ORAL at 16:20

## 2024-09-19 RX ADMIN — IBUPROFEN 600 MG: 600 TABLET, FILM COATED ORAL at 06:07

## 2024-09-19 RX ADMIN — ACETAMINOPHEN 1000 MG: 500 TABLET ORAL at 01:02

## 2024-09-19 NOTE — PLAN OF CARE
Problem: Adult Inpatient Plan of Care  Goal: Plan of Care Review  Outcome: Ongoing, Progressing  Flowsheets  Taken 2024 1531 by Larissa Aponte RN  Progress: improving  Outcome Evaluation:   VSS   lochia WNL   voiding spontaneously without difficulty   pain well controlled with PO meds   ambulating independently on and off unit to visit  in NICU   bonding appropriate for situation     Taken 2024 0958 by Kay Brice RN  Plan of Care Reviewed With: patient  Goal: Patient-Specific Goal (Individualized)  Outcome: Ongoing, Progressing  Goal: Absence of Hospital-Acquired Illness or Injury  Outcome: Ongoing, Progressing  Intervention: Identify and Manage Fall Risk  Recent Flowsheet Documentation  Taken 2024 1500 by Larissa Aponte RN  Safety Promotion/Fall Prevention: patient off unit  Taken 2024 1400 by Larissa Aponte RN  Safety Promotion/Fall Prevention: safety round/check completed  Taken 2024 1300 by Larissa Aponte RN  Safety Promotion/Fall Prevention: safety round/check completed  Taken 2024 1200 by Larissa Aponte RN  Safety Promotion/Fall Prevention: patient off unit  Taken 2024 1146 by Larissa Aponte RN  Safety Promotion/Fall Prevention: safety round/check completed  Taken 2024 1100 by Larissa Aponte RN  Safety Promotion/Fall Prevention: patient off unit  Taken 2024 1039 by Larissa Aponte RN  Safety Promotion/Fall Prevention: patient off unit  Taken 2024 1000 by Larissa Aponte RN  Safety Promotion/Fall Prevention: (NICU then cafeteria)   patient off unit   other (see comments)  Taken 2024 0730 by Larissa Aponte RN  Safety Promotion/Fall Prevention: safety round/check completed  Intervention: Prevent Skin Injury  Recent Flowsheet Documentation  Taken 2024 0730 by Larissa Aponte RN  Body Position: sitting up in bed  Intervention: Prevent and Manage VTE (Venous Thromboembolism) Risk  Recent Flowsheet Documentation  Taken  9/19/2024 0730 by Larissa Aponte RN  Activity Management: up ad jose  VTE Prevention/Management: sequential compression devices on  Goal: Optimal Comfort and Wellbeing  Outcome: Ongoing, Progressing  Intervention: Monitor Pain and Promote Comfort  Recent Flowsheet Documentation  Taken 9/19/2024 1146 by Larissa Aponte RN  Pain Management Interventions: see MAR  Intervention: Provide Person-Centered Care  Recent Flowsheet Documentation  Taken 9/19/2024 0730 by Larissa Aponte RN  Trust Relationship/Rapport:   care explained   choices provided   questions answered   thoughts/feelings acknowledged  Goal: Readiness for Transition of Care  Outcome: Ongoing, Progressing     Problem: Device-Related Complication Risk (Anesthesia/Analgesia, Neuraxial)  Goal: Safe Infusion Delivery Completion  Outcome: Ongoing, Progressing     Problem: Infection (Anesthesia/Analgesia, Neuraxial)  Goal: Absence of Infection Signs and Symptoms  Outcome: Ongoing, Progressing     Problem: Nausea and Vomiting (Anesthesia/Analgesia, Neuraxial)  Goal: Nausea and Vomiting Relief  Outcome: Ongoing, Progressing     Problem: Pain (Anesthesia/Analgesia, Neuraxial)  Goal: Effective Pain Control  Outcome: Ongoing, Progressing  Intervention: Prevent or Manage Pain  Recent Flowsheet Documentation  Taken 9/19/2024 1146 by Larissa Aponte RN  Pain Management Interventions: see MAR  Taken 9/19/2024 0730 by Larissa Aponte RN  Diversional Activities: television     Problem: Respiratory Compromise (Anesthesia/Analgesia, Neuraxial)  Goal: Effective Oxygenation and Ventilation  Outcome: Ongoing, Progressing  Intervention: Optimize Oxygenation and Ventilation  Recent Flowsheet Documentation  Taken 9/19/2024 0730 by Larissa Aponte RN  Head of Bed (HOB) Positioning: HOB elevated     Problem: Sensorimotor Impairment (Anesthesia/Analgesia, Neuraxial)  Goal: Baseline Motor Function  Outcome: Ongoing, Progressing  Intervention: Optimize Sensorimotor  Function  Recent Flowsheet Documentation  Taken 2024 1500 by Larissa Aponte RN  Safety Promotion/Fall Prevention: patient off unit  Taken 2024 1400 by Larissa Aponte RN  Safety Promotion/Fall Prevention: safety round/check completed  Taken 2024 1300 by Larissa Aponte RN  Safety Promotion/Fall Prevention: safety round/check completed  Taken 2024 1200 by Larissa Aponte RN  Safety Promotion/Fall Prevention: patient off unit  Taken 2024 1146 by Larissa Aponte RN  Safety Promotion/Fall Prevention: safety round/check completed  Taken 2024 1100 by Larissa Aponte RN  Safety Promotion/Fall Prevention: patient off unit  Taken 2024 1039 by Larissa Aponte RN  Safety Promotion/Fall Prevention: patient off unit  Taken 2024 1000 by Larissa Aponte RN  Safety Promotion/Fall Prevention: (NICU then cafeteria)   patient off unit   other (see comments)  Taken 2024 0730 by Larissa Aponte RN  Safety Promotion/Fall Prevention: safety round/check completed     Problem: Urinary Retention (Anesthesia/Analgesia, Neuraxial)  Goal: Effective Urinary Elimination  Outcome: Ongoing, Progressing     Problem:  Fall Injury Risk  Goal: Absence of Fall, Infant Drop and Related Injury  Outcome: Ongoing, Progressing  Intervention: Identify and Manage Contributors  Recent Flowsheet Documentation  Taken 2024 0730 by Larissa Aponte RN  Medication Review/Management: medications reviewed  Intervention: Promote Injury-Free Environment  Recent Flowsheet Documentation  Taken 2024 1500 by Larissa Aponte RN  Safety Promotion/Fall Prevention: patient off unit  Taken 2024 1400 by Larissa Aponte RN  Safety Promotion/Fall Prevention: safety round/check completed  Taken 2024 1300 by Larissa Aponte RN  Safety Promotion/Fall Prevention: safety round/check completed  Taken 2024 1200 by Larissa Aponte RN  Safety Promotion/Fall Prevention: patient off unit  Taken  2024 1146 by Larissa Aponte RN  Safety Promotion/Fall Prevention: safety round/check completed  Taken 2024 1100 by Larissa Aponte RN  Safety Promotion/Fall Prevention: patient off unit  Taken 2024 1039 by Larissa Aponte RN  Safety Promotion/Fall Prevention: patient off unit  Taken 2024 1000 by Larissa Aponte RN  Safety Promotion/Fall Prevention: (NICU then cafeteria)   patient off unit   other (see comments)  Taken 2024 0730 by Larissa Aponte RN  Safety Promotion/Fall Prevention: safety round/check completed     Problem: Skin Injury Risk Increased  Goal: Skin Health and Integrity  Outcome: Ongoing, Progressing  Intervention: Optimize Skin Protection  Recent Flowsheet Documentation  Taken 2024 0730 by Larissa Aponte RN  Head of Bed (HOB) Positioning: HOB elevated     Problem: Adjustment to Role Transition (Postpartum  Delivery)  Goal: Successful Maternal Role Transition  Outcome: Ongoing, Progressing  Intervention: Support Maternal Role Transition  Recent Flowsheet Documentation  Taken 2024 0730 by Larissa Aponte RN  Supportive Measures: active listening utilized  Parent/Child Attachment Promotion: rooming-in promoted     Problem: Bleeding (Postpartum  Delivery)  Goal: Hemostasis  Outcome: Ongoing, Progressing     Problem: Infection (Postpartum  Delivery)  Goal: Absence of Infection Signs and Symptoms  Outcome: Ongoing, Progressing     Problem: Pain (Postpartum  Delivery)  Goal: Acceptable Pain Control  Outcome: Ongoing, Progressing  Intervention: Prevent or Manage Pain  Recent Flowsheet Documentation  Taken 2024 1146 by Larissa Aponte RN  Pain Management Interventions: see MAR     Problem: Postoperative Nausea and Vomiting (Postpartum  Delivery)  Goal: Nausea and Vomiting Relief  Outcome: Ongoing, Progressing     Problem: Postoperative Urinary Retention (Postpartum  Delivery)  Goal: Effective Urinary  Elimination  Outcome: Ongoing, Progressing   Goal Outcome Evaluation:           Progress: improving  Outcome Evaluation: VSS; lochia WNL; voiding spontaneously without difficulty; pain well controlled with PO meds; ambulating independently on and off unit to visit  in NICU; bonding appropriate for situation;

## 2024-09-19 NOTE — PROGRESS NOTES
PROGRESS NOTE:   POSTOP DAY 1      PATIENT: Isabella Christiansen        MR#:[id number]  LOCATION: [facility]  DATE OF ADMISSION: 2024  ADMISSION  DIAGNOSIS:   History of     Placenta previa in third trimester    Pregnancy     CURRENT DIAGNOSIS: No notes have been filed under this hospital service.  Service: Hospitalist    SERVICE: Obstetrics      History of     Placenta previa in third trimester    Pregnancy        PROCEDURES:  , Low Transverse     2024    8:16 AM      ASSESSMENT/PLAN  Status Post  Delivery Day 1:   Postpartum care immediately following  delivery: Doing well postoperatively. Continue routine postoperative and supportive care. Discontinue IV, advance diet, may shower.  DVT Prophylaxis: BMI 29.80. SCD's while at rest. Encouraged ambulation.   Hgb: 12.7.  RH STATUS: AB positive  SYPHILIS SCREEN DELIVERY ADMIT: treponemal antibody non-reactive upon admission  RUBELLA: immune  VARICELLA: unknown immunity  INFANT GENDER: male, currently in the NICU      Subjective    34 y.o. yo Female  status post  section day 1 at 36w0d doing well. Isabella denies any emotional concerns. Pain well controlled with current medications. Lochia appropriate. She is tolerating a regular diet and passing flatus. Urinary output has been adequate.         Objective   Vitals  Temp:  Temp:  [97.1 °F (36.2 °C)-98 °F (36.7 °C)] 97.5 °F (36.4 °C)  Temp src: Oral  BP:  BP: ()/(44-79) 119/79  Pulse:  Heart Rate:  [62-93] 87  RR:   Resp:  [16-18] 16    General:  Awake, alert, no acute distress   Cardiac: Regular rate and rhythm    Respiratory: Lungs clear bilaterally, normal respiratory effort    Abdomen: Soft, non-distended, fundus firm, below umbilicus, appropriately tender   Incision: Healing well, no dehiscence, no significant drainage, no erythema or exudate   Pelvis: deferred   Extremities: Calves NT bilaterally, DTR 2+, no clonus noted,  trace edema     I/O last 3 completed shifts:  In: 1447 [I.V.:1447]  Out: 2624 [Urine:1950; Blood:674]  Lab Results   Component Value Date    WBC 14.77 (H) 09/18/2024    HGB 12.7 09/18/2024    HCT 36.7 09/18/2024    MCV 87.8 09/18/2024     09/18/2024    CREATININE 0.43 (L) 09/18/2024    AST 7 09/18/2024    ALT 6 09/18/2024    HEPBSAG Negative 03/20/2024     Results from last 7 days   Lab Units 09/18/24  0627   ABO TYPING  AB   RH TYPING  Positive   ANTIBODY SCREEN  Negative       Renu Cage CNM  9/19/2024   08:23 EDT

## 2024-09-20 PROCEDURE — 0503F POSTPARTUM CARE VISIT: CPT

## 2024-09-20 RX ORDER — FERROUS SULFATE 325(65) MG
325 TABLET ORAL
Status: DISCONTINUED | OUTPATIENT
Start: 2024-09-20 | End: 2024-09-22 | Stop reason: HOSPADM

## 2024-09-20 RX ADMIN — OXYCODONE HYDROCHLORIDE 5 MG: 5 TABLET ORAL at 05:23

## 2024-09-20 RX ADMIN — OXYCODONE HYDROCHLORIDE 5 MG: 5 TABLET ORAL at 15:00

## 2024-09-20 RX ADMIN — IBUPROFEN 600 MG: 600 TABLET, FILM COATED ORAL at 10:48

## 2024-09-20 RX ADMIN — IBUPROFEN 600 MG: 600 TABLET, FILM COATED ORAL at 00:59

## 2024-09-20 RX ADMIN — FERROUS SULFATE TAB 325 MG (65 MG ELEMENTAL FE) 325 MG: 325 (65 FE) TAB at 10:48

## 2024-09-20 RX ADMIN — ACETAMINOPHEN 325MG 650 MG: 325 TABLET ORAL at 14:10

## 2024-09-20 RX ADMIN — IBUPROFEN 600 MG: 600 TABLET, FILM COATED ORAL at 18:56

## 2024-09-20 RX ADMIN — OXYCODONE HYDROCHLORIDE 5 MG: 5 TABLET ORAL at 00:59

## 2024-09-20 RX ADMIN — OXYCODONE HYDROCHLORIDE 5 MG: 5 TABLET ORAL at 10:48

## 2024-09-20 RX ADMIN — Medication 1 TABLET: at 10:49

## 2024-09-20 RX ADMIN — ACETAMINOPHEN 325MG 650 MG: 325 TABLET ORAL at 20:38

## 2024-09-20 RX ADMIN — ACETAMINOPHEN 325MG 650 MG: 325 TABLET ORAL at 05:23

## 2024-09-20 RX ADMIN — OXYCODONE HYDROCHLORIDE 5 MG: 5 TABLET ORAL at 18:56

## 2024-09-20 RX ADMIN — OXYCODONE HYDROCHLORIDE 10 MG: 10 TABLET ORAL at 23:26

## 2024-09-20 NOTE — PROGRESS NOTES
"Discharge Planning Assessment  [facility]     Patient Name: Isabella Christiansen  MRN: [id number]  Today's Date: 9/20/2024    Admit Date: 9/18/2024    Plan: Infant may discharge to mother when medically ready. DALTON Pacheco.   Discharge Needs Assessment    No documentation.                  Discharge Plan       Row Name 09/20/24 1130       Plan    Plan Infant may discharge to mother when medically ready. DALTON Pacheco.    Plan Comments Mother: Isabella Christiansen \"Haley\", MRN: [id number]; infant: Star \"Wake\" Елена, MRN: [id number]. CSW consulted for \"NICU admission & resources.\" Of note, no toxicology screens were ordered for mother or infant as need was not warranted at this time. CSW met with mother alone at bedside. Mother verified address, phone number, and insurance. Mother reports [employer] has not spoken to her about adding infant to health insurance. Mother reports having a car seat, crib/bassinet, clothes, and diapers for infant. Mother has three other children from a previous relationship: 17yr old, 14yr old, & 2yr old, who are being cared for by maternal sister during hospital stay. FOB is not involved. Mother reports, maternal grandpa, maternal sister, children, and other family members are available for support as needed. Mother reports infant is following up with Dr. Mcdermott after discharge; mother is comfortable scheduling appointments for infant and has reliable transportation. Mother is not current with [facility] and voiced interest in applying. CSW consulted the hospital WIC rep. Mother denies any violence, threats, or feeling unsafe at home or with FOB. CSW explained her role as NICU  and encouraged mother to reach out if needed. CSW provided mother with a packet of resources including: WIC, HANDS, transportation, infant supplies, counseling, online support groups, postpartum mood and anxiety resources, NICU parent resources, and general community resources. CSW spent time building " rapport with mother, and offered validation, support, and encouragement to mother throughout assessment. Mother was polite and appropriate, and denied having unmet needs or concerns at this time. CSW will remain available for psychosocial needs while infant is in the NICU. DALTON Pacheco.                  Continued Care and Services - Admitted Since 9/18/2024    No active coordination exists for this encounter.       Expected Discharge Date and Time       Expected Discharge Date Expected Discharge Time    Sep 20, 2024            Demographic Summary       Row Name 09/20/24 1129       General Information    Admission Type inpatient    Arrived From home    Referral Source nursing    Reason for Consult community resources;other (see comments)    General Information Comments NICU admission & resources.                   Functional Status    No documentation.                  Psychosocial    No documentation.                  Abuse/Neglect    No documentation.                  Legal    No documentation.                  Substance Abuse    No documentation.                  Patient Forms    No documentation.                     CARRIE Wallace

## 2024-09-20 NOTE — PROGRESS NOTES
PROGRESS NOTE:   POSTOP DAY 2      PATIENT: Isabella Christiansen        MR#:[id number]  LOCATION: [facility]  DATE OF ADMISSION: 2024  ADMISSION  DIAGNOSIS:     History of     Placenta previa in third trimester    Pregnancy     CURRENT DIAGNOSIS:   No notes have been filed under this hospital service.  Service: Hospitalist    SERVICE: Obstetrics      History of     Placenta previa in third trimester    Pregnancy        PROCEDURES:  , Low Transverse     2024    8:16 AM      ASSESSMENT/PLAN  Status Post  Delivery Day 2:   Postpartum care immediately following  delivery: Doing well postoperatively. Continue routine postoperative and supportive care. Discontinue IV, advance diet, may shower. Plan for discharge tomorrow as clinical picture allows.   DVT Prophylaxis: BMI 29.80. SCD's while at rest. Encouraged ambulation.   Hgb: 12.7.  RH STATUS: AB positive  SYPHILIS SCREEN DELIVERY ADMIT: treponemal antibody non-reactive upon admission  RUBELLA: immune  VARICELLA: unknown immunity  INFANT GENDER: male, currently in the NICU    Subjective    34 y.o. yo Female  status post  section at 36w0d doing well. Pain well controlled . Lochia appropriate.    Objective   Vitals  Temp:  Temp:  [97.7 °F (36.5 °C)] 97.7 °F (36.5 °C)  Temp src: Oral  BP:  BP: (117-126)/(79-82) 126/82  Pulse:  Heart Rate:  [78-81] 81  RR:   Resp:  [16-18] 18    General:  Awake, alert, no acute distress   Cardiac: Regular rate and rhythm    Respiratory: Lungs clear bilaterally, normal respiratory effort    Abdomen: Soft, non-distended, fundus firm, below umbilicus, appropriately tender   Incision: Healing well, no dehiscence, no significant drainage, no erythema or exudate   Pelvis: deferred   Extremities: Calves NT bilaterally, DTR 2+, no clonus noted, trace edema     I/O last 3 completed shifts:  In: -   Out: 950 [Urine:950]    Lab Results   Component Value Date    WBC  11.05 (H) 09/19/2024    HGB 9.6 (L) 09/19/2024    HCT 28.2 (L) 09/19/2024    MCV 89.8 09/19/2024     09/19/2024    CREATININE 0.43 (L) 09/18/2024    AST 7 09/18/2024    ALT 6 09/18/2024    HEPBSAG Negative 03/20/2024     Results from last 7 days   Lab Units 09/18/24  0627   ABO TYPING  AB   RH TYPING  Positive   ANTIBODY SCREEN  Negative          Renu Cage CNM  9/20/2024   07:23 EDT

## 2024-09-21 LAB
BH BB BLOOD EXPIRATION DATE: NORMAL
BH BB BLOOD EXPIRATION DATE: NORMAL
BH BB BLOOD TYPE BARCODE: 8400
BH BB BLOOD TYPE BARCODE: 8400
BH BB DISPENSE STATUS: NORMAL
BH BB DISPENSE STATUS: NORMAL
BH BB PRODUCT CODE: NORMAL
BH BB PRODUCT CODE: NORMAL
BH BB UNIT NUMBER: NORMAL
BH BB UNIT NUMBER: NORMAL
CROSSMATCH INTERPRETATION: NORMAL
CROSSMATCH INTERPRETATION: NORMAL
UNIT  ABO: NORMAL
UNIT  ABO: NORMAL
UNIT  RH: NORMAL
UNIT  RH: NORMAL

## 2024-09-21 PROCEDURE — 0503F POSTPARTUM CARE VISIT: CPT | Performed by: OBSTETRICS & GYNECOLOGY

## 2024-09-21 RX ADMIN — FERROUS SULFATE TAB 325 MG (65 MG ELEMENTAL FE) 325 MG: 325 (65 FE) TAB at 11:33

## 2024-09-21 RX ADMIN — OXYCODONE HYDROCHLORIDE 5 MG: 5 TABLET ORAL at 20:15

## 2024-09-21 RX ADMIN — ACETAMINOPHEN 325MG 650 MG: 325 TABLET ORAL at 15:35

## 2024-09-21 RX ADMIN — Medication 1 TABLET: at 11:33

## 2024-09-21 RX ADMIN — OXYCODONE HYDROCHLORIDE 5 MG: 5 TABLET ORAL at 11:33

## 2024-09-21 RX ADMIN — OXYCODONE HYDROCHLORIDE 5 MG: 5 TABLET ORAL at 15:35

## 2024-09-21 RX ADMIN — IBUPROFEN 600 MG: 600 TABLET, FILM COATED ORAL at 02:16

## 2024-09-21 RX ADMIN — IBUPROFEN 600 MG: 600 TABLET, FILM COATED ORAL at 11:33

## 2024-09-21 RX ADMIN — ACETAMINOPHEN 325MG 650 MG: 325 TABLET ORAL at 02:16

## 2024-09-21 RX ADMIN — OXYCODONE HYDROCHLORIDE 10 MG: 10 TABLET ORAL at 03:27

## 2024-09-21 RX ADMIN — IBUPROFEN 600 MG: 600 TABLET, FILM COATED ORAL at 20:07

## 2024-09-21 RX ADMIN — OXYCODONE HYDROCHLORIDE 5 MG: 5 TABLET ORAL at 07:39

## 2024-09-21 RX ADMIN — DOCUSATE SODIUM 100 MG: 100 CAPSULE, LIQUID FILLED ORAL at 20:07

## 2024-09-21 NOTE — PROGRESS NOTES
Section Progress Note    Assessment & Plan     Status post  section: Doing well postoperatively.     Postoperative care--she is meeting all postoperative milestones.  She desires discharge home tomorrow as baby is in the NICU.    Rh status: AB pos  Syphilis screen delivery admit: Nonreactive  Rubella: Immune  Gender: male    Subjective     Postpartum Day 3:  Delivery    The patient feels well. The patient denies emotional concerns. Pain is well controlled with current medications. The baby is well. Urinary output is adequate. The patient is ambulating well. The patient is tolerating a normal diet. Patient reports flatus.    Objective     Vital signs in last 24 hours:  Temp:  [97.6 °F (36.4 °C)-98 °F (36.7 °C)] 97.6 °F (36.4 °C)  Heart Rate:  [] 87  Resp:  [16] 16  BP: (114-127)/(77-86) 114/77      General:    alert, appears stated age, and cooperative   Bowel Sounds:  active   Lochia:  appropriate   Uterine Fundus:   firm   Incision:  healing well, no significant drainage, no dehiscence, no significant erythema   DVT Evaluation:  No evidence of DVT seen on physical exam.     Lab Results   Component Value Date    WBC 11.05 (H) 2024    HGB 9.6 (L) 2024    HCT 28.2 (L) 2024    MCV 89.8 2024     2024         Frances Noyola MD  2024  14:01 EDT

## 2024-09-22 VITALS
BODY MASS INDEX: 29.8 KG/M2 | WEIGHT: 168.2 LBS | SYSTOLIC BLOOD PRESSURE: 114 MMHG | HEIGHT: 63 IN | RESPIRATION RATE: 16 BRPM | TEMPERATURE: 97.7 F | HEART RATE: 75 BPM | OXYGEN SATURATION: 100 % | DIASTOLIC BLOOD PRESSURE: 74 MMHG

## 2024-09-22 PROCEDURE — 0503F POSTPARTUM CARE VISIT: CPT | Performed by: OBSTETRICS & GYNECOLOGY

## 2024-09-22 RX ORDER — OXYCODONE HYDROCHLORIDE 5 MG/1
5 TABLET ORAL EVERY 8 HOURS PRN
Qty: 9 TABLET | Refills: 0 | Status: SHIPPED | OUTPATIENT
Start: 2024-09-22

## 2024-09-22 RX ORDER — IBUPROFEN 600 MG/1
600 TABLET, FILM COATED ORAL EVERY 6 HOURS PRN
Qty: 50 TABLET | Refills: 3 | Status: SHIPPED | OUTPATIENT
Start: 2024-09-22

## 2024-09-22 RX ADMIN — IBUPROFEN 600 MG: 600 TABLET, FILM COATED ORAL at 09:31

## 2024-09-22 RX ADMIN — Medication 1 TABLET: at 09:31

## 2024-09-22 RX ADMIN — FERROUS SULFATE TAB 325 MG (65 MG ELEMENTAL FE) 325 MG: 325 (65 FE) TAB at 08:53

## 2024-09-22 RX ADMIN — OXYCODONE HYDROCHLORIDE 5 MG: 5 TABLET ORAL at 14:53

## 2024-09-22 RX ADMIN — OXYCODONE HYDROCHLORIDE 5 MG: 5 TABLET ORAL at 11:08

## 2024-09-22 RX ADMIN — IBUPROFEN 600 MG: 600 TABLET, FILM COATED ORAL at 14:53

## 2024-09-22 RX ADMIN — IBUPROFEN 600 MG: 600 TABLET, FILM COATED ORAL at 03:24

## 2024-09-22 RX ADMIN — OXYCODONE HYDROCHLORIDE 5 MG: 5 TABLET ORAL at 06:13

## 2024-09-22 RX ADMIN — ACETAMINOPHEN 325MG 650 MG: 325 TABLET ORAL at 00:22

## 2024-09-22 RX ADMIN — OXYCODONE HYDROCHLORIDE 5 MG: 5 TABLET ORAL at 00:22

## 2024-09-22 RX ADMIN — ACETAMINOPHEN 325MG 650 MG: 325 TABLET ORAL at 06:13

## 2024-09-22 RX ADMIN — ACETAMINOPHEN 325MG 650 MG: 325 TABLET ORAL at 14:22

## 2024-09-22 NOTE — DISCHARGE SUMMARY
Date of Discharge:  2024    Discharge Diagnosis: postpartum care immediately after delivery    Presenting Problem/History of Present Illness  History of  [Z98.891]  Placenta previa in third trimester [O44.03]  Pregnancy [Z34.90]       Hospital Course  Patient is a 34 y.o. female  36w0d presented with scheduled RCD and sterilization done at this time for placenta previea.  For events surrounding her delivery please see delivery/op note.  Her postpartum course was uneventful and today POD#4, she is ready for discharge.  She meets all milestones and criteria for discharge and instructions were reviewed and she voiced understanding.     Procedures Performed  Procedure(s):   SECTION REPEAT WITH SALPINGECTOMY  TOTAL ABDOMINAL HYSTERECTOMY POST  SECTION       Consults:   Consults       No orders found from 2024 to 2024.            Pertinent Test Results: Hg 9.6 g    Condition on Discharge:  Stable     Discharge Disposition  Home or Self Care    Discharge Medications     Discharge Medications        New Medications        Instructions Start Date   ibuprofen 600 MG tablet  Commonly known as: ADVIL,MOTRIN   600 mg, Oral, Every 6 Hours PRN      oxyCODONE 5 MG immediate release tablet  Commonly known as: ROXICODONE   5 mg, Oral, Every 8 Hours PRN             Continue These Medications        Instructions Start Date   acetaminophen 500 MG tablet  Commonly known as: TYLENOL   1,000 mg, Oral, Every 6 Hours PRN      prenatal vitamin 27-0.8 27-0.8 MG tablet tablet   1 tablet, Oral, Daily      Symbicort 80-4.5 MCG/ACT inhaler  Generic drug: budesonide-formoterol   2 puffs, Inhalation, 2 Times Daily               Discharge Diet:   Diet Instructions       Diet: Regular/House Diet; Regular Texture (IDDSI 7); Thin (IDDSI 0)      Discharge Diet: Regular/House Diet    Texture: Regular Texture (IDDSI 7)    Fluid Consistency: Thin (IDDSI 0)            Activity at Discharge:   Activity  Instructions       Other Instructions (Specify)      No driving or tub baths for 2 weeks, No heavy lifting and pelvic rest for 6 weeks     Pelvic Rest              Follow-up Appointments  No future appointments.  Additional Instructions for the Follow-ups that You Need to Schedule       Discharge Follow-up with Specialty: Dr. Cooper; 2 Weeks   As directed      Specialty: Dr. Cooper   Follow Up: 2 Weeks   Follow Up Details: Post op check                Test Results Pending at Discharge       Eleno Heredia MD  09/22/24  13:00 EDT

## 2024-09-22 NOTE — PLAN OF CARE
Goal Outcome Evaluation:              Outcome Evaluation: VSS, VISITING BABY IN NICU, PAIN WELL CONTROLLED WITH MEDS,

## 2024-09-22 NOTE — PROGRESS NOTES
Section Progress Note    Assessment & Plan     Status post  section: Doing well postoperatively.     1) postpartum care immediately after delivery : Doing well, routine care. Discharge home - instructions reviewed.   2) Anemia postpartum, acute blood loss - had RCD and sterilization. Delivery complicated by possible hysterectomy for placenta previa/possible accreta but did not need.     Rh status: AB positive   Syphilis screen delivery admit: NR   Rubella: Immune   Gender: Male, NICU for      Subjective     Postpartum Day 4:  Delivery    The patient feels well. The patient denies emotional concerns. Pain is well controlled with current medications. The baby is well. Urinary output is adequate. The patient is ambulating well. The patient is tolerating a normal diet. Patient reports flatus.    Objective     Vital signs in last 24 hours:  Temp:  [97.7 °F (36.5 °C)-98 °F (36.7 °C)] 97.7 °F (36.5 °C)  Heart Rate:  [75-94] 75  Resp:  [16] 16  BP: (114-132)/(74-87) 114/74      General:    alert, appears stated age, and cooperative   Bowel Sounds:  active   Lochia:  appropriate   Uterine Fundus:   firm   Incision:  healing well, no significant drainage, no dehiscence, no significant erythema   DVT Evaluation:  No evidence of DVT seen on physical exam.     Lab Results   Component Value Date    WBC 11.05 (H) 2024    HGB 9.6 (L) 2024    HCT 28.2 (L) 2024    MCV 89.8 2024     2024         Eleno Heredia MD  2024  12:53 EDT

## 2024-09-23 ENCOUNTER — TELEPHONE (OUTPATIENT)
Dept: OBSTETRICS AND GYNECOLOGY | Facility: CLINIC | Age: 34
End: 2024-09-23
Payer: MEDICAID

## 2024-09-23 RX ORDER — OXYCODONE AND ACETAMINOPHEN 7.5; 325 MG/1; MG/1
1 TABLET ORAL EVERY 6 HOURS PRN
Qty: 9 TABLET | Refills: 0 | Status: SHIPPED | OUTPATIENT
Start: 2024-09-23

## 2024-09-26 ENCOUNTER — TELEPHONE (OUTPATIENT)
Dept: OBSTETRICS AND GYNECOLOGY | Facility: CLINIC | Age: 34
End: 2024-09-26
Payer: MEDICAID

## 2024-09-26 NOTE — TELEPHONE ENCOUNTER
Pt needing to schedule PO appt  Had csection and tubal 9/18, states she is only wanting to see provider for her PO that is due 10/2.     Is it ok to add pt on with you for PO?   Please advise, thank you!

## 2024-10-01 ENCOUNTER — OFFICE VISIT (OUTPATIENT)
Dept: OBSTETRICS AND GYNECOLOGY | Facility: CLINIC | Age: 34
End: 2024-10-01
Payer: MEDICAID

## 2024-10-01 VITALS
BODY MASS INDEX: 27.85 KG/M2 | DIASTOLIC BLOOD PRESSURE: 98 MMHG | HEIGHT: 63 IN | WEIGHT: 157.2 LBS | HEART RATE: 68 BPM | SYSTOLIC BLOOD PRESSURE: 146 MMHG

## 2024-10-01 NOTE — PROGRESS NOTES
"        REASON FOR FOLLOWUP/CHIEF COMPLAINT:  (Patient here for her, 2 week post op regarding . Patient is still having some burning sensations where her scar is  )      HISTORY OF PRESENT ILLNESS: Patient is 2 weeks out from repeat  with tubal sterilization.  Her baby is still in the NICU due to prematurity issues.  She is getting around better but still having some limitations regarding burning and pulling in the left aspect of her incision.  She has had no fever or chills or drainage at her incision site.      Past Medical History, Past Surgical History, Social History, Family History have been reviewed and are without significant changes except as mentioned.    Review of Systems   Review of Systems   Gastrointestinal:         Abdominal wall discomfort near the left aspect of incision   Genitourinary:  Positive for vaginal bleeding. Negative for pelvic pain.       Medications:  The current medication list was reviewed in the EMR    ALLERGIES:    Allergies   Allergen Reactions    Lisinopril Other (See Comments)     shakes    Sulfamethoxazole-Trimethoprim Other (See Comments)     Migraine    Morphine Rash     Red and itching               Vitals:    10/01/24 1414   BP: 146/98   Pulse: 68   Weight: 71.3 kg (157 lb 3.2 oz)   Height: 160 cm (63\")     Physical Exam    CONSTITUTIONAL:  Vital signs reviewed.  No distress, looks comfortable.  EYES:  Conjunctiva and lids unremarkable.  PERRLA  EARS,NOSE,MOUTH,THROAT:  Ears and nose appear unremarkable.  Lips, teeth, gums appear unremarkable.  RESPIRATORY:  Normal respiratory effort.  Lungs clear to auscultation bilaterally.  CARDIOVASCULAR:  Normal S1, S2.  No murmurs rubs or gallops.  No significant lower extremity edema.  GASTROINTESTINAL: Abdomen appears unremarkable.  Nontender.  No hepatomegaly.  No splenomegaly.  Well-healing Pfannenstiel incision with no fluctuance or separation or defect appreciated.  Appropriately tender.  NEURO: cranial nerves " 2-12 grossly intact.  No focal deficits.  Appears to have equal strength all 4 extremities.  MUSCULOSKELETAL:  Unremarkable digits/nails.  No cyanosis or clubbing.  SKIN:  Warm.  No rashes.  PSYCHIATRIC:  Normal judgment and insight.  Normal mood and affect.       RECENT LABS:        ASSESSMENT/PLAN:  Isabella Christiansen [unfilled]   1.  2-week postop , doing well.  Recommend continued use of acetaminophen and ibuprofen as needed and to increase her activities to use her abdominal muscles to improve healing.  Appointment in 4 weeks for final postop check.

## 2024-10-09 DIAGNOSIS — J45.30 MILD PERSISTENT ASTHMA, UNSPECIFIED WHETHER COMPLICATED: ICD-10-CM

## 2024-10-09 RX ORDER — DILTIAZEM HYDROCHLORIDE 60 MG/1
2 TABLET, FILM COATED ORAL 2 TIMES DAILY
Qty: 10.2 G | Refills: 0 | Status: SHIPPED | OUTPATIENT
Start: 2024-10-09

## 2024-10-28 RX ORDER — TRANEXAMIC ACID 650 MG/1
2 TABLET ORAL 3 TIMES DAILY
Qty: 30 TABLET | Refills: 0 | Status: SHIPPED | OUTPATIENT
Start: 2024-10-28 | End: 2024-11-02

## 2024-10-30 ENCOUNTER — POSTPARTUM VISIT (OUTPATIENT)
Dept: OBSTETRICS AND GYNECOLOGY | Facility: CLINIC | Age: 34
End: 2024-10-30
Payer: MEDICAID

## 2024-10-30 VITALS
SYSTOLIC BLOOD PRESSURE: 146 MMHG | DIASTOLIC BLOOD PRESSURE: 90 MMHG | HEART RATE: 84 BPM | WEIGHT: 149.4 LBS | HEIGHT: 63 IN | BODY MASS INDEX: 26.47 KG/M2

## 2024-10-30 DIAGNOSIS — N92.0 MENORRHAGIA WITH REGULAR CYCLE: Primary | ICD-10-CM

## 2024-10-30 PROCEDURE — 99024 POSTOP FOLLOW-UP VISIT: CPT | Performed by: OBSTETRICS & GYNECOLOGY

## 2024-10-30 NOTE — PROGRESS NOTES
"        REASON FOR FOLLOWUP/CHIEF COMPLAINT: (Patient here for her 6 week post op regarding )      HISTORY OF PRESENT ILLNESS: Patient here for 6-week follow-up from .  She started which she thinks is her first menses on Saturday, 10/26/2024 and it has been heavy with clots.  We called in Lysteda which she started to take on Monday 10/28.  She states that it will slow down and then start back up again.  She is not dizzy or lightheaded.  She did have a tubal sterilization with her pregnancy.  She did have a  section for placenta previa but there was no evidence of placenta accreta at the time of the surgery or on the placenta pathology report.  She also reports mood lability and easy tearfulness still at this stage out from delivery.  She has a lot of home factors that are beyond her control and has to care for her mother who recently had an MI.  She has taken SSRIs in the past and tolerated them.  She does agree to do a low-dose trial of Zoloft.      Past Medical History, Past Surgical History, Social History, Family History have been reviewed and are without significant changes except as mentioned.    Review of Systems   Review of Systems   Constitutional:  Negative for fever.   Genitourinary:  Positive for menstrual problem and vaginal bleeding.       Medications:  The current medication list was reviewed in the EMR    ALLERGIES:    Allergies   Allergen Reactions    Lisinopril Other (See Comments)     shakes    Sulfamethoxazole-Trimethoprim Other (See Comments)     Migraine    Morphine Rash     Red and itching               Vitals:    10/30/24 1412   BP: 146/90   Pulse: 84   Weight: 67.8 kg (149 lb 6.4 oz)   Height: 160 cm (63\")     Physical Exam    CONSTITUTIONAL:  Vital signs reviewed.  No distress, looks comfortable.  EYES:  Conjunctiva and lids unremarkable.  PERRLA  EARS,NOSE,MOUTH,THROAT:  Ears and nose appear unremarkable.  Lips, teeth, gums appear unremarkable.  RESPIRATORY:  " Normal respiratory effort.  Lungs clear to auscultation bilaterally.  CARDIOVASCULAR:  Normal S1, S2.  No murmurs rubs or gallops.  No significant lower extremity edema.  GASTROINTESTINAL: Abdomen appears unremarkable.  Nontender.  No hepatomegaly.  No splenomegaly.  Well-healed Pfannenstiel incision.  NEURO: cranial nerves 2-12 grossly intact.  No focal deficits.  Appears to have equal strength all 4 extremities.  MUSCULOSKELETAL:  Unremarkable digits/nails.  No cyanosis or clubbing.  SKIN:  Warm.  No rashes.  PSYCHIATRIC:  Normal judgment and insight.  Normal mood and affect.       RECENT LABS:        ASSESSMENT/PLAN:  Isabella Christiansen [unfilled]   1.  Postop 6 weeks from  and tubal sterilization, doing well  2.  Heavy menstrual bleeding with first menses.  She will continue the Lysteda for a couple more days.  I will go ahead and get a CBC and GYN ultrasound to make sure she does not have any concerning tissue collection within the uterus.  3.  Postpartum depression, will start with Zoloft 50 mg

## 2024-10-31 LAB
ERYTHROCYTE [DISTWIDTH] IN BLOOD BY AUTOMATED COUNT: 11.9 % (ref 11.7–15.4)
HCT VFR BLD AUTO: 43.7 % (ref 34–46.6)
HGB BLD-MCNC: 14.3 G/DL (ref 11.1–15.9)
MCH RBC QN AUTO: 30.2 PG (ref 26.6–33)
MCHC RBC AUTO-ENTMCNC: 32.7 G/DL (ref 31.5–35.7)
MCV RBC AUTO: 92 FL (ref 79–97)
PLATELET # BLD AUTO: 325 X10E3/UL (ref 150–450)
RBC # BLD AUTO: 4.73 X10E6/UL (ref 3.77–5.28)
WBC # BLD AUTO: 10.7 X10E3/UL (ref 3.4–10.8)

## 2024-11-08 DIAGNOSIS — J45.30 MILD PERSISTENT ASTHMA, UNSPECIFIED WHETHER COMPLICATED: ICD-10-CM

## 2024-11-08 RX ORDER — DILTIAZEM HYDROCHLORIDE 60 MG/1
TABLET, FILM COATED ORAL
Qty: 10.2 G | Refills: 0 | Status: SHIPPED | OUTPATIENT
Start: 2024-11-08

## 2024-11-08 NOTE — TELEPHONE ENCOUNTER
Rx Refill Note  Requested Prescriptions     Pending Prescriptions Disp Refills    Symbicort 80-4.5 MCG/ACT inhaler [Pharmacy Med Name: SYMBICORT 80/4.5MCG (120  ORAL INH)] 10.2 g 0     Sig: INHALE 2 PUFFS BY MOUTH TWICE DAILY. RINSE MOUTH AFTER EACH USE      Last office visit with prescribing clinician: 11/1/2023   Last telemedicine visit with prescribing clinician: Visit date not found   Next office visit with prescribing clinician: Visit date not found                         Would you like a call back once the refill request has been completed: [] Yes [] No    If the office needs to give you a call back, can they leave a voicemail: [] Yes [] No    Yvonne Martinez MA  11/08/24, 10:13 EST

## 2025-01-20 DIAGNOSIS — Z98.891 HISTORY OF C-SECTION: Primary | ICD-10-CM

## 2025-01-20 RX ORDER — TRAMADOL HYDROCHLORIDE 50 MG/1
50 TABLET ORAL EVERY 6 HOURS PRN
Qty: 20 TABLET | Refills: 0 | Status: SHIPPED | OUTPATIENT
Start: 2025-01-20 | End: 2026-01-20

## 2025-02-07 ENCOUNTER — PATIENT ROUNDING (BHMG ONLY) (OUTPATIENT)
Dept: URGENT CARE | Facility: CLINIC | Age: 35
End: 2025-02-07
Payer: MEDICAID

## 2025-02-07 NOTE — ED NOTES
Thank you for letting us care for you during your recent visit at [facility]. We would love to hear about your experience with us.         We’re always looking for ways to make our patients’ experiences even better. Do you have any recommendations on ways we may improve?         I appreciate you taking the time to respond. Please be on the lookout for a survey about your recent visit from [facility] via text or email. We would greatly appreciate if you could fill that out and turn it back in. We want your voice to be heard and we value your feedback.         Thank you for choosing [facility] for your healthcare needs.

## 2025-02-15 ENCOUNTER — DOCUMENTATION (OUTPATIENT)
Dept: OBSTETRICS AND GYNECOLOGY | Facility: CLINIC | Age: 35
End: 2025-02-15
Payer: MEDICAID

## 2025-02-15 DIAGNOSIS — Z98.891 HISTORY OF C-SECTION: ICD-10-CM

## 2025-02-15 DIAGNOSIS — Z98.891 HISTORY OF C-SECTION: Primary | ICD-10-CM

## 2025-02-15 RX ORDER — TRAMADOL HYDROCHLORIDE 50 MG/1
50 TABLET ORAL EVERY 6 HOURS PRN
Qty: 20 TABLET | Refills: 0 | Status: SHIPPED | OUTPATIENT
Start: 2025-02-15 | End: 2026-02-15

## 2025-02-17 ENCOUNTER — OFFICE VISIT (OUTPATIENT)
Dept: OBSTETRICS AND GYNECOLOGY | Facility: CLINIC | Age: 35
End: 2025-02-17
Payer: MEDICAID

## 2025-02-17 VITALS
DIASTOLIC BLOOD PRESSURE: 93 MMHG | BODY MASS INDEX: 24.95 KG/M2 | SYSTOLIC BLOOD PRESSURE: 121 MMHG | WEIGHT: 140.8 LBS | HEIGHT: 63 IN

## 2025-02-17 DIAGNOSIS — R10.2 PELVIC PAIN: ICD-10-CM

## 2025-02-17 DIAGNOSIS — N92.0 MENORRHAGIA WITH REGULAR CYCLE: Primary | ICD-10-CM

## 2025-02-17 RX ORDER — SODIUM CHLORIDE 9 MG/ML
40 INJECTION, SOLUTION INTRAVENOUS AS NEEDED
OUTPATIENT
Start: 2025-02-17

## 2025-02-17 RX ORDER — SODIUM CHLORIDE 0.9 % (FLUSH) 0.9 %
10 SYRINGE (ML) INJECTION AS NEEDED
OUTPATIENT
Start: 2025-02-17

## 2025-02-17 RX ORDER — SODIUM CHLORIDE 0.9 % (FLUSH) 0.9 %
3 SYRINGE (ML) INJECTION EVERY 12 HOURS SCHEDULED
OUTPATIENT
Start: 2025-02-17

## 2025-02-17 NOTE — PROGRESS NOTES
REASON FOR FOLLOWUP/CHIEF COMPLAINT: Patient is here regarding severe cramping and bleeding during cycle)      HISTORY OF PRESENT ILLNESS: Patient with regular and very heavy and painful menses since her last  and salpingectomy.  She is expended over-the-counter medication such as ibuprofen and acetaminophen.  She has only gotten relief with Ultram and the discomfort is significantly affecting her ability to perform normal duties needed to care for her children.  She had a normal pelvic ultrasound in 2024 showing the following:  No comparison study. Today's study shows normal size uterus measuring 8.3 x 4 cm. The endometrium appears normal. Endometrium measures 0.58 cm. Both ovaries appear normal. A very small fluid collection within the endometrium but no evidence of retained products of conception. Recent  scar noted anteriorly.   She is not a candidate for oral contraceptives due to tobacco use and her age.  She has failed intrauterine device in the past and had continued heavy bleeding on Mirena IUD.  She wishes to move toward definitive management in the form of laparoscopic hysterectomy.  I did offer her endometrial ablation as something to consider but she would prefer to move toward hysterectomy.  Risks of bleeding, infection, injury to adjacent organs discussed with the patient and the recommendation to leave her ovaries intact was also discussed.  Past Medical History, Past Surgical History, Social History, Family History have been reviewed and are without significant changes except as mentioned.    Review of Systems   Review of Systems   Genitourinary:  Positive for menstrual problem and pelvic pain.       Medications:  The current medication list was reviewed in the EMR    ALLERGIES:    Allergies   Allergen Reactions    Lisinopril Other (See Comments)     shakes    Sulfamethoxazole-Trimethoprim Other (See Comments)     Migraine    Morphine Rash     Red and itching   "             Vitals:    02/17/25 1410   BP: 121/93   Weight: 63.9 kg (140 lb 12.8 oz)   Height: 160 cm (63\")     Physical Exam    CONSTITUTIONAL:  Vital signs reviewed.  No distress, looks comfortable.  EYES:  Conjunctiva and lids unremarkable.  PERRLA  EARS,NOSE,MOUTH,THROAT:  Ears and nose appear unremarkable.  Lips, teeth, gums appear unremarkable.  RESPIRATORY:  Normal respiratory effort.  Lungs clear to auscultation bilaterally.  CARDIOVASCULAR:  Normal S1, S2.  No murmurs rubs or gallops.  No significant lower extremity edema.  GASTROINTESTINAL: Abdomen appears unremarkable.  Nontender.  No hepatomegaly.  No splenomegaly.  NEURO: cranial nerves 2-12 grossly intact.  No focal deficits.  Appears to have equal strength all 4 extremities.  MUSCULOSKELETAL:  Unremarkable digits/nails.  No cyanosis or clubbing.  SKIN:  Warm.  No rashes.  PSYCHIATRIC:  Normal judgment and insight.  Normal mood and affect.       RECENT LABS:        ASSESSMENT/PLAN:  Isabella Christiansen [unfilled]   1.  Menorrhagia with severe dysmenorrhea and pelvic pain following tubal sterilization.  Will work on scheduling laparoscopic hysterectomy.  "

## 2025-02-18 ENCOUNTER — OFFICE VISIT (OUTPATIENT)
Dept: FAMILY MEDICINE CLINIC | Facility: CLINIC | Age: 35
End: 2025-02-18
Payer: MEDICAID

## 2025-02-18 VITALS
TEMPERATURE: 98.4 F | WEIGHT: 140.4 LBS | SYSTOLIC BLOOD PRESSURE: 124 MMHG | DIASTOLIC BLOOD PRESSURE: 78 MMHG | BODY MASS INDEX: 24.88 KG/M2 | HEIGHT: 63 IN | HEART RATE: 82 BPM | OXYGEN SATURATION: 100 %

## 2025-02-18 DIAGNOSIS — J45.30 MILD PERSISTENT ASTHMA, UNSPECIFIED WHETHER COMPLICATED: ICD-10-CM

## 2025-02-18 DIAGNOSIS — Z13.220 ENCOUNTER FOR LIPID SCREENING FOR CARDIOVASCULAR DISEASE: ICD-10-CM

## 2025-02-18 DIAGNOSIS — F41.9 ANXIETY: ICD-10-CM

## 2025-02-18 DIAGNOSIS — D64.9 ANEMIA, UNSPECIFIED TYPE: ICD-10-CM

## 2025-02-18 DIAGNOSIS — I10 PRIMARY HYPERTENSION: ICD-10-CM

## 2025-02-18 DIAGNOSIS — E53.8 FOLATE DEFICIENCY: ICD-10-CM

## 2025-02-18 DIAGNOSIS — R60.0 LOCALIZED EDEMA: ICD-10-CM

## 2025-02-18 DIAGNOSIS — Z13.6 ENCOUNTER FOR LIPID SCREENING FOR CARDIOVASCULAR DISEASE: ICD-10-CM

## 2025-02-18 DIAGNOSIS — N92.0 MENORRHAGIA WITH REGULAR CYCLE: ICD-10-CM

## 2025-02-18 DIAGNOSIS — Z00.00 ENCOUNTER FOR ANNUAL PHYSICAL EXAM: Primary | ICD-10-CM

## 2025-02-18 DIAGNOSIS — J01.90 ACUTE NON-RECURRENT SINUSITIS, UNSPECIFIED LOCATION: ICD-10-CM

## 2025-02-18 DIAGNOSIS — R53.83 FATIGUE, UNSPECIFIED TYPE: ICD-10-CM

## 2025-02-18 DIAGNOSIS — F33.0 MILD EPISODE OF RECURRENT MAJOR DEPRESSIVE DISORDER: ICD-10-CM

## 2025-02-18 PROCEDURE — 99395 PREV VISIT EST AGE 18-39: CPT | Performed by: NURSE PRACTITIONER

## 2025-02-18 PROCEDURE — 1160F RVW MEDS BY RX/DR IN RCRD: CPT | Performed by: NURSE PRACTITIONER

## 2025-02-18 PROCEDURE — 1159F MED LIST DOCD IN RCRD: CPT | Performed by: NURSE PRACTITIONER

## 2025-02-18 PROCEDURE — 2014F MENTAL STATUS ASSESS: CPT | Performed by: NURSE PRACTITIONER

## 2025-02-18 RX ORDER — BUDESONIDE AND FORMOTEROL FUMARATE DIHYDRATE 80; 4.5 UG/1; UG/1
2 AEROSOL RESPIRATORY (INHALATION)
Qty: 10.2 G | Refills: 2 | Status: SHIPPED | OUTPATIENT
Start: 2025-02-18

## 2025-02-18 RX ORDER — FLUTICASONE PROPIONATE 50 MCG
2 SPRAY, SUSPENSION (ML) NASAL DAILY
Qty: 16 G | Refills: 2 | Status: SHIPPED | OUTPATIENT
Start: 2025-02-18

## 2025-02-18 RX ORDER — ALBUTEROL SULFATE 90 UG/1
2 INHALANT RESPIRATORY (INHALATION) EVERY 6 HOURS PRN
Qty: 18 G | Refills: 0 | Status: SHIPPED | OUTPATIENT
Start: 2025-02-18

## 2025-02-18 NOTE — PROGRESS NOTES
Subjective   Isabella Christiansen is a 34 y.o. female.     Chief Complaint   Patient presents with    Annual Exam       History of Present Illness   Patient presents for CPE with fasting labs.  Nutrition:  pretty healthy diet  Exercise:  not much exercise recently, stays active with kids, considering going to gym  Dental:  regular dental visits, twice per year  Vision:  last eye exam , wears glasses for reading as needed  Hearing:  no problems hearing   Immunizations:  declines flu, COVID-19, and PCV20 at this time  PAP:  sees Dr. Cooper for female care; last PAP--pt thinks had with first prenatal visit last year  Mammo:  mammo never performed; no family history of breast cancer  Colonoscopy:  never performed; no family history of colon cancer     F/U anxiety/depression: in September, baby born early and then was in NICU for 23 days; has 3 other children; then baby home and mom had heart attack and was in hospital for period of time; had some postpartum depression and took Sertraline for couple of months, but did not like how felt on medication so stopped taking; was over thinking things and increased anxiety when on Sertraline; feels better off medication at this point; not much sleep; will sleep for 3 hours and then have trouble falling back to sleep since had baby; no medication for sleep; soon to be ex- has been out of her life since 2024; has been managing all kids on her own; mother is in long term care facility at this point; baby has been sleeping 9-10 hours at night; 3 year old gets up during night on occasion; has 18 year old going against her; overall, things improving and able to work through anxiety; sometimes enjoys quiet of being up during night; see PHQ-2; no SI/HI.    F/U menorrhagia with dysmenorrhea: has upcoming hysterectomy due to pelvic pain; had  and tubal ligation in September; menses have been very heavy; takes Ibuprofen and Tramadol as needed for cramping and  helps.    F/U HTN/swelling: no longer taking HCTZ; no more swelling; BP has been WNL since no longer with ex-.    F/U asthma: uses Symbicort twice daily as needed and works well; has not had Albuterol inhaler recently; has had cough and congestion since the fall; was sick 2 weeks ago, negative for flu and COVID-19; baby had RSV; had fever; was seen at Urgent Care and gave Rx for Doxycycline and Prednisone and symptoms improved, but still has cough; has had nonproductive cough.      The following portions of the patient's history were reviewed and updated as appropriate: allergies, current medications, past family history, past medical history, past social history, past surgical history and problem list.      Current Outpatient Medications   Medication Sig Dispense Refill    budesonide-formoterol (Symbicort) 80-4.5 MCG/ACT inhaler Inhale 2 puffs 2 (Two) Times a Day. 10.2 g 2    ibuprofen (ADVIL,MOTRIN) 600 MG tablet Take 1 tablet by mouth Every 6 (Six) Hours As Needed for Mild Pain. 50 tablet 3    traMADol (Ultram) 50 MG tablet Take 1 tablet by mouth Every 6 (Six) Hours As Needed for Moderate Pain. 20 tablet 0    albuterol sulfate  (90 Base) MCG/ACT inhaler Inhale 2 puffs Every 6 (Six) Hours As Needed for Shortness of Air or Wheezing. 18 g 0    fluticasone (FLONASE) 50 MCG/ACT nasal spray Administer 2 sprays into the nostril(s) as directed by provider Daily. 16 g 2     No current facility-administered medications for this visit.       Past Medical History:   Diagnosis Date    Allergic     Asthma     History of  section     X4    Hypertension 2023    Placenta accreta 2024    5TH CHILD    Vaginal bleeding affecting early pregnancy 2024       Past Surgical History:   Procedure Laterality Date    BREAST AUGMENTATION       SECTION       SECTION N/A 2022    Procedure:  SECTION REPEAT;  Surgeon: Jeffery Cooper MD;  Location: [facility] LABOR DELIVERY;   Service: Obstetrics/Gynecology;  Laterality: N/A;     SECTION       SECTION Bilateral 2024    Procedure:  SECTION REPEAT WITH SALPINGECTOMY;  Surgeon: Jeffery Cooper MD;  Location: [facility];  Service: Obstetrics/Gynecology;  Laterality: Bilateral;    COSMETIC SURGERY  2006    TOTAL ABDOMINAL HYSTERECTOMY Bilateral 2024    Procedure: TOTAL ABDOMINAL HYSTERECTOMY POST  SECTION;  Surgeon: Jeffery Cooper MD;  Location: [facility] OR;  Service: Obstetrics/Gynecology;  Laterality: Bilateral;       Family History   Problem Relation Age of Onset    Hypertension Mother     Dementia Mother     Stroke Mother 65    Cancer Mother         Lung    Heart disease Mother     Hyperlipidemia Mother     Asthma Mother     COPD Mother     Miscarriages / Stillbirths Mother     Diabetes Father     Hypertension Father     Hyperlipidemia Father     Stroke Father     Hyperlipidemia Sister     Asthma Sister     Malig Hyperthermia Neg Hx        Social History     Socioeconomic History    Marital status:    Tobacco Use    Smoking status: Every Day     Current packs/day: 1.00     Average packs/day: 1 pack/day for 4.0 years (4.0 ttl pk-yrs)     Types: Cigarettes    Smokeless tobacco: Never    Tobacco comments:     Previously smoking about 1 pack per day for about 5 years, currently smoking less than half pack daily; back to smoking a pack per day   Vaping Use    Vaping status: Never Used   Substance and Sexual Activity    Alcohol use: Never    Drug use: Never    Sexual activity: Not Currently     Partners: Male     Birth control/protection: Tubal ligation       Review of Systems   Constitutional:  Positive for fatigue (see HPI). Negative for chills, fever, unexpected weight gain and unexpected weight loss. Appetite change: does not eat much due to things have not tasted same since was pregnant.  HENT:  Negative for ear pain, sinus pressure, sore throat and trouble swallowing.   "  Eyes:  Negative for blurred vision and discharge.   Respiratory:  Positive for cough (see HPI). Negative for chest tightness and shortness of breath.    Cardiovascular:  Negative for chest pain and palpitations.   Gastrointestinal:  Negative for abdominal pain, blood in stool, constipation, diarrhea and GERD.   Endocrine: Negative for cold intolerance and polydipsia.   Genitourinary:  Negative for dysuria and frequency.   Musculoskeletal:  Negative for arthralgias. Back pain: some in lower back at times; will have flares of sciatica on left at times; resolves with Ibuprofen and/or muscle relaxer.  Skin:  Negative for rash and skin lesions.   Neurological:  Negative for dizziness, syncope and light-headedness. Headache: some headaches with recent illness; no change in vision or nausea/vomiting with headaches.  Hematological:  Bruises/bleeds easily: some easy bruising.   Psychiatric/Behavioral:  Negative for suicidal ideas. Sleep disturbance: see HPI; declines medication at this time.     PHQ-2 Depression Screening  Little interest or pleasure in doing things? Not at all   Feeling down, depressed, or hopeless? Not at all   PHQ-2 Total Score 0         Objective   Vitals:    02/18/25 1517   BP: 124/78   BP Location: Right arm   Patient Position: Sitting   Cuff Size: Adult   Pulse: 82   Temp: 98.4 °F (36.9 °C)   TempSrc: Temporal   SpO2: 100%   Weight: 63.7 kg (140 lb 6.4 oz)   Height: 160 cm (62.99\")     Body mass index is 24.88 kg/m².    Physical Exam  Vitals and nursing note reviewed.   Constitutional:       General: She is not in acute distress.     Appearance: She is well-developed and well-groomed. She is not diaphoretic.   HENT:      Head: Normocephalic and atraumatic.      Jaw: No tenderness or pain on movement (some decreased ROM of jaw due to wisdom tooth; has follow up with dentist soon).      Right Ear: External ear normal. No decreased hearing noted. Right ear middle ear effusion: mild. Tympanic membrane " is not erythematous.      Left Ear: External ear normal. No decreased hearing noted. Left ear middle ear effusion: mild. Tympanic membrane is not erythematous.      Nose: Nose normal.      Right Sinus: No maxillary sinus tenderness or frontal sinus tenderness.      Left Sinus: No maxillary sinus tenderness or frontal sinus tenderness.      Mouth/Throat:      Mouth: Mucous membranes are moist.      Pharynx: No oropharyngeal exudate or posterior oropharyngeal erythema.   Eyes:      Extraocular Movements: Extraocular movements intact.      Conjunctiva/sclera: Conjunctivae normal.      Pupils: Pupils are equal, round, and reactive to light.   Neck:      Thyroid: No thyromegaly.      Vascular: No carotid bruit.      Trachea: No tracheal deviation.   Cardiovascular:      Rate and Rhythm: Normal rate and regular rhythm.      Pulses: Normal pulses.      Heart sounds: Normal heart sounds. No murmur heard.  Pulmonary:      Effort: Pulmonary effort is normal. No respiratory distress.      Breath sounds: Normal breath sounds.   Abdominal:      General: Bowel sounds are normal.      Palpations: Abdomen is soft. There is no hepatomegaly or splenomegaly.      Tenderness: There is no abdominal tenderness. There is no guarding.   Musculoskeletal:         General: Normal range of motion.      Cervical back: Normal range of motion and neck supple. No bony tenderness.      Thoracic back: No bony tenderness.      Lumbar back: No bony tenderness.      Right lower leg: No edema.      Left lower leg: No edema.   Lymphadenopathy:      Cervical: No cervical adenopathy.   Skin:     General: Skin is warm and dry.      Findings: No rash.   Neurological:      Mental Status: She is alert and oriented to person, place, and time.      Cranial Nerves: No cranial nerve deficit.      Motor: Motor function is intact.      Coordination: Coordination normal.      Gait: Gait normal.      Deep Tendon Reflexes: Reflexes are normal and symmetric.    Psychiatric:         Mood and Affect: Mood normal.         Behavior: Behavior normal.         Thought Content: Thought content normal.         Cognition and Memory: Cognition normal.         Judgment: Judgment normal.       5/15/23 lipid panel: , Trig 87, HDL 64, LDL 63  9/18/24 CMP WNL except sodium 135, alk phos 195; CBC WNL except WBC 14.77, neutrophils 9.71, lymph 3.47    Assessment    Problem List Items Addressed This Visit       RESOLVED: Primary hypertension    Mild persistent asthma    Current Assessment & Plan     Continue Symbicort twice daily during flares.  Continue Albuterol inhaler as needed.         Relevant Medications    albuterol sulfate  (90 Base) MCG/ACT inhaler    budesonide-formoterol (Symbicort) 80-4.5 MCG/ACT inhaler    Fatigue    Relevant Orders    Comprehensive Metabolic Panel (Completed)    CBC & Differential (Completed)    Vitamin B12 & Folate (Completed)    TSH Rfx On Abnormal To Free T4 (Completed)    Mild episode of recurrent major depressive disorder    Current Assessment & Plan     Stable off medication.  Increase exercise.         Anxiety    Current Assessment & Plan     Stable off medication.  Increase exercise.         RESOLVED: Localized edema    Folate deficiency    Relevant Orders    Vitamin B12 & Folate (Completed)    Menorrhagia with regular cycle    Current Assessment & Plan     Follow up as scheduled with GYN.         Anemia    Relevant Orders    CBC & Differential (Completed)    Vitamin B12 & Folate (Completed)    Iron (Completed)    Ferritin (Completed)    Acute non-recurrent sinusitis    Current Assessment & Plan     Try nasal steroid, such as Flonase or Nasacort.  May try over the counter Delsym as needed for cough.         Relevant Medications    fluticasone (FLONASE) 50 MCG/ACT nasal spray     Other Visit Diagnoses       Encounter for annual physical exam    -  Primary    Relevant Orders    Comprehensive Metabolic Panel (Completed)    CBC & Differential  (Completed)    Lipid Panel With LDL / HDL Ratio (Completed)    Vitamin B12 & Folate (Completed)    TSH Rfx On Abnormal To Free T4 (Completed)    Iron (Completed)    Ferritin (Completed)    Encounter for lipid screening for cardiovascular disease        Relevant Orders    Lipid Panel With LDL / HDL Ratio (Completed)             Return in about 6 months (around 8/18/2025) for Recheck.or sooner if symptoms persist or worsen.  Impression: Health maintenance visit.  Currently, eats a pretty healthy diet and has an inadequate exercise routine.  Cervical cancer screening: per GYN.  Breast cancer screening: not indicated.  Colorectal cancer screening: not indicated.  Screening lab work includes: CMP, lipid.  Immunizations: eclines flu, COVID-19, and PCV20 at this time; risks and benefits of immunizations were discussed with patient.  Patient was advised to be evaluated by ophthalmology.  Advice and education were given regarding nutrition and aerobic exercise.   Patient has had nonproductive cough since recent illness 2 weeks ago; pt finished Doxycycline and Prednisone and symptoms improved; will try adding nasal steroid and see if helps.

## 2025-02-18 NOTE — PATIENT INSTRUCTIONS
Try nasal steroid, such as Flonase or Nasacort.  May try over the counter Delsym as needed for cough.  Continue to work on healthy diet and exercise.  Follow up pending lab results.  Follow up in 6 months, or sooner if symptoms persist or worsen.

## 2025-02-19 LAB
ALBUMIN SERPL-MCNC: 4.2 G/DL (ref 3.9–4.9)
ALP SERPL-CCNC: 112 IU/L (ref 44–121)
ALT SERPL-CCNC: 8 IU/L (ref 0–32)
AST SERPL-CCNC: 12 IU/L (ref 0–40)
BASOPHILS # BLD AUTO: 0.1 X10E3/UL (ref 0–0.2)
BASOPHILS NFR BLD AUTO: 1 %
BILIRUB SERPL-MCNC: 0.4 MG/DL (ref 0–1.2)
BUN SERPL-MCNC: 4 MG/DL (ref 6–20)
BUN/CREAT SERPL: 7 (ref 9–23)
CALCIUM SERPL-MCNC: 9.7 MG/DL (ref 8.7–10.2)
CHLORIDE SERPL-SCNC: 104 MMOL/L (ref 96–106)
CHOLEST SERPL-MCNC: 184 MG/DL (ref 100–199)
CO2 SERPL-SCNC: 23 MMOL/L (ref 20–29)
CREAT SERPL-MCNC: 0.61 MG/DL (ref 0.57–1)
EGFRCR SERPLBLD CKD-EPI 2021: 119 ML/MIN/1.73
EOSINOPHIL # BLD AUTO: 0.3 X10E3/UL (ref 0–0.4)
EOSINOPHIL NFR BLD AUTO: 3 %
ERYTHROCYTE [DISTWIDTH] IN BLOOD BY AUTOMATED COUNT: 14.6 % (ref 11.7–15.4)
FERRITIN SERPL-MCNC: 24 NG/ML (ref 15–150)
FOLATE SERPL-MCNC: 3.9 NG/ML
GLOBULIN SER CALC-MCNC: 2.5 G/DL (ref 1.5–4.5)
GLUCOSE SERPL-MCNC: 77 MG/DL (ref 70–99)
HCT VFR BLD AUTO: 38.9 % (ref 34–46.6)
HDLC SERPL-MCNC: 55 MG/DL
HGB BLD-MCNC: 12.3 G/DL (ref 11.1–15.9)
IMM GRANULOCYTES # BLD AUTO: 0 X10E3/UL (ref 0–0.1)
IMM GRANULOCYTES NFR BLD AUTO: 0 %
IRON SERPL-MCNC: 130 UG/DL (ref 27–159)
LDLC SERPL CALC-MCNC: 111 MG/DL (ref 0–99)
LDLC/HDLC SERPL: 2 RATIO (ref 0–3.2)
LYMPHOCYTES # BLD AUTO: 3 X10E3/UL (ref 0.7–3.1)
LYMPHOCYTES NFR BLD AUTO: 38 %
MCH RBC QN AUTO: 26 PG (ref 26.6–33)
MCHC RBC AUTO-ENTMCNC: 31.6 G/DL (ref 31.5–35.7)
MCV RBC AUTO: 82 FL (ref 79–97)
MONOCYTES # BLD AUTO: 0.6 X10E3/UL (ref 0.1–0.9)
MONOCYTES NFR BLD AUTO: 7 %
NEUTROPHILS # BLD AUTO: 4.1 X10E3/UL (ref 1.4–7)
NEUTROPHILS NFR BLD AUTO: 51 %
PLATELET # BLD AUTO: 559 X10E3/UL (ref 150–450)
POTASSIUM SERPL-SCNC: 4.6 MMOL/L (ref 3.5–5.2)
PROT SERPL-MCNC: 6.7 G/DL (ref 6–8.5)
RBC # BLD AUTO: 4.73 X10E6/UL (ref 3.77–5.28)
SODIUM SERPL-SCNC: 140 MMOL/L (ref 134–144)
TRIGL SERPL-MCNC: 102 MG/DL (ref 0–149)
TSH SERPL DL<=0.005 MIU/L-ACNC: 1.3 UIU/ML (ref 0.45–4.5)
VIT B12 SERPL-MCNC: 446 PG/ML (ref 232–1245)
VLDLC SERPL CALC-MCNC: 18 MG/DL (ref 5–40)
WBC # BLD AUTO: 8 X10E3/UL (ref 3.4–10.8)

## 2025-02-20 PROBLEM — M26.621 ARTHRALGIA OF RIGHT TEMPOROMANDIBULAR JOINT: Status: RESOLVED | Noted: 2023-02-23 | Resolved: 2025-02-20

## 2025-02-20 PROBLEM — D64.9 ANEMIA: Status: ACTIVE | Noted: 2025-02-20

## 2025-02-20 PROBLEM — J01.90 ACUTE NON-RECURRENT SINUSITIS: Status: ACTIVE | Noted: 2025-02-20

## 2025-02-20 PROBLEM — R60.0 LOCALIZED EDEMA: Status: RESOLVED | Noted: 2023-11-01 | Resolved: 2025-02-20

## 2025-02-20 PROBLEM — O09.523 MULTIGRAVIDA OF ADVANCED MATERNAL AGE IN THIRD TRIMESTER: Status: RESOLVED | Noted: 2024-08-05 | Resolved: 2025-02-20

## 2025-02-20 PROBLEM — I10 PRIMARY HYPERTENSION: Status: RESOLVED | Noted: 2023-02-23 | Resolved: 2025-02-20

## 2025-02-20 PROBLEM — E66.811 CLASS 1 OBESITY WITHOUT SERIOUS COMORBIDITY WITH BODY MASS INDEX (BMI) OF 30.0 TO 30.9 IN ADULT: Status: RESOLVED | Noted: 2023-05-17 | Resolved: 2025-02-20

## 2025-02-20 PROBLEM — Z34.90 PREGNANCY: Status: RESOLVED | Noted: 2024-09-18 | Resolved: 2025-02-20

## 2025-02-20 PROBLEM — O20.9 VAGINAL BLEEDING AFFECTING EARLY PREGNANCY: Status: RESOLVED | Noted: 2024-08-04 | Resolved: 2025-02-20

## 2025-02-20 NOTE — ASSESSMENT & PLAN NOTE
Try nasal steroid, such as Flonase or Nasacort.  May try over the counter Delsym as needed for cough.

## 2025-03-11 DIAGNOSIS — J01.90 ACUTE NON-RECURRENT SINUSITIS, UNSPECIFIED LOCATION: Primary | ICD-10-CM

## 2025-03-11 RX ORDER — AMOXICILLIN 875 MG/1
875 TABLET, COATED ORAL 2 TIMES DAILY
Qty: 14 TABLET | Refills: 0 | Status: SHIPPED | OUTPATIENT
Start: 2025-03-11 | End: 2025-03-18

## 2025-04-21 ENCOUNTER — TELEPHONE (OUTPATIENT)
Dept: FAMILY MEDICINE CLINIC | Facility: CLINIC | Age: 35
End: 2025-04-21
Payer: MEDICAID

## 2025-04-21 NOTE — TELEPHONE ENCOUNTER
I called and spoke with the patient and let her know that Aleena could not send in any medication unless she saw her first. Because of her schedule she said she would not be able to get in until Friday so I found an appointment Friday. She did say her kids are sick as well and right now she just has a cough that makes her feel like she is going to throw up and she has a little shortness of air as well. She had no questions or concerns at this time and will call back if needed.

## 2025-04-21 NOTE — TELEPHONE ENCOUNTER
"    Caller: Isabella Christiansen \"Haley\"    Relationship: Self    Best call back number: [phone]    What medication are you requesting: COUGH MEDICATION    What are your current symptoms: COUGH THAT IS REALLY BAD, FEELS LIKE SHE COULD THROW UP WHEN COUGHING. PATIENT HAS TRIED DIFFERENT OVER THE COUNTER MEDICATIONS BUT NOTHING HAS HELPED. PATIENT ALSO HAS BEEN TAKING CLARITIN DAILY, BUT FEELS LIKE IT'S BEYOND THIS.     How long have you been experiencing symptoms: 1 WEEK      If a prescription is needed, what is your preferred pharmacy and phone number:  [id number] [place], [place] - [address] AT [facility] - [phone] [facility] [phone]  [phone]     Additional notes: PLEASE ADVISE.    "

## 2025-05-29 ENCOUNTER — OFFICE VISIT (OUTPATIENT)
Dept: FAMILY MEDICINE CLINIC | Facility: CLINIC | Age: 35
End: 2025-05-29
Payer: MEDICAID

## 2025-05-29 VITALS
HEART RATE: 91 BPM | WEIGHT: 132.2 LBS | TEMPERATURE: 98.9 F | OXYGEN SATURATION: 99 % | DIASTOLIC BLOOD PRESSURE: 60 MMHG | HEIGHT: 63 IN | SYSTOLIC BLOOD PRESSURE: 100 MMHG | BODY MASS INDEX: 23.42 KG/M2

## 2025-05-29 DIAGNOSIS — Z91.018 FOOD ALLERGY: Primary | ICD-10-CM

## 2025-05-29 DIAGNOSIS — Z72.0 TOBACCO ABUSE: ICD-10-CM

## 2025-05-29 DIAGNOSIS — R05.2 SUBACUTE COUGH: ICD-10-CM

## 2025-05-29 DIAGNOSIS — J44.9 CHRONIC OBSTRUCTIVE PULMONARY DISEASE, UNSPECIFIED COPD TYPE: ICD-10-CM

## 2025-05-29 RX ORDER — VARENICLINE TARTRATE 0.5 (11)-1
KIT ORAL
Qty: 1 EACH | Refills: 0 | Status: SHIPPED | OUTPATIENT
Start: 2025-05-29 | End: 2025-06-26

## 2025-05-29 RX ORDER — FAMOTIDINE 20 MG/1
20 TABLET, FILM COATED ORAL NIGHTLY
Qty: 90 TABLET | Refills: 1 | Status: SHIPPED | OUTPATIENT
Start: 2025-05-29

## 2025-05-29 RX ORDER — BUDESONIDE, GLYCOPYRROLATE, AND FORMOTEROL FUMARATE 160; 9; 4.8 UG/1; UG/1; UG/1
2 AEROSOL, METERED RESPIRATORY (INHALATION) 2 TIMES DAILY
Qty: 10.7 G | Refills: 5 | Status: SHIPPED | OUTPATIENT
Start: 2025-05-29

## 2025-05-29 RX ORDER — BUDESONIDE, GLYCOPYRROLATE, AND FORMOTEROL FUMARATE 160; 9; 4.8 UG/1; UG/1; UG/1
2 AEROSOL, METERED RESPIRATORY (INHALATION) 2 TIMES DAILY
Qty: 5.9 G | Refills: 0 | COMMUNITY
Start: 2025-05-29

## 2025-05-29 RX ORDER — DEXTROMETHORPHAN HYDROBROMIDE AND PROMETHAZINE HYDROCHLORIDE 15; 6.25 MG/5ML; MG/5ML
5 SYRUP ORAL 4 TIMES DAILY PRN
Qty: 180 ML | Refills: 0 | Status: SHIPPED | OUTPATIENT
Start: 2025-05-29

## 2025-05-29 RX ORDER — VARENICLINE TARTRATE 1 MG/1
1 TABLET, FILM COATED ORAL 2 TIMES DAILY
Qty: 56 TABLET | Refills: 1 | Status: SHIPPED | OUTPATIENT
Start: 2025-06-26 | End: 2025-08-21

## 2025-05-29 NOTE — PROGRESS NOTES
"Chief Complaint  Cough (Cough since January but not prod.)    Subjective        Isabella Christiansen presents to [facility]  History of Present Illness  Visit observed and assisted by RAMESH Reilly patient.    Patient is here to discuss ongoing dry cough since January. About two months ago she noticed it worsened a little more when both her kids had an upper respiratory infection. She has tried flonase, robitussin, bromfed, zyrtec, claritin. She has a history of asthma, using symbicort and albuterol. She was given an antibiotic in February which didn't help. Mother did have lung cancer. Patient is current every day smoker. She denies heart burn. She does wake up in the middle of the night coughing, but propping up does not improve.     She would a referral to allergist due to possible development of food allergies. She has a known peanut allergy, and lately she has not been able to eat leonarda ketchup, steak sauce, banana.     Objective   Vital Signs:  /60 (BP Location: Left arm, Patient Position: Sitting, Cuff Size: Adult)   Pulse 91   Temp 98.9 °F (37.2 °C) (Temporal)   Ht 160 cm (63\")   Wt 60 kg (132 lb 3.2 oz)   SpO2 99%   BMI 23.42 kg/m²   Estimated body mass index is 23.42 kg/m² as calculated from the following:    Height as of this encounter: 160 cm (63\").    Weight as of this encounter: 60 kg (132 lb 3.2 oz).    BMI is within normal parameters. No other follow-up for BMI required.      Physical Exam  Constitutional:       Appearance: Normal appearance. She is normal weight.   HENT:      Head: Normocephalic.      Nose: Nose normal.   Eyes:      Extraocular Movements: Extraocular movements intact.      Pupils: Pupils are equal, round, and reactive to light.   Cardiovascular:      Rate and Rhythm: Normal rate and regular rhythm.      Heart sounds: Normal heart sounds.   Pulmonary:      Effort: Pulmonary effort is normal.      Breath sounds: Normal breath sounds. "   Musculoskeletal:         General: Normal range of motion.      Cervical back: Normal range of motion.   Skin:     General: Skin is warm and dry.   Neurological:      General: No focal deficit present.      Mental Status: She is alert and oriented to person, place, and time.   Psychiatric:         Mood and Affect: Mood normal.        Result Review :                Assessment and Plan   Diagnoses and all orders for this visit:    1. Food allergy (Primary)  -     Ambulatory Referral to Allergy; Future    2. Chronic obstructive pulmonary disease, unspecified COPD type  -     XR Chest PA & Lateral; Future  -     Budeson-Glycopyrrol-Formoterol (Breztri Aerosphere) 160-9-4.8 MCG/ACT aerosol inhaler; Inhale 2 puffs 2 (Two) Times a Day.  Dispense: 10.7 g; Refill: 5  -     Budeson-Glycopyrrol-Formoterol (Breztri Aerosphere) 160-9-4.8 MCG/ACT aerosol inhaler; Inhale 2 puffs 2 (Two) Times a Day.  Dispense: 5.9 g; Refill: 0    3. Subacute cough  -     Complete PFT - Pre & Post Bronchodilator; Future  -     famotidine (Pepcid) 20 MG tablet; Take 1 tablet by mouth Every Night.  Dispense: 90 tablet; Refill: 1  -     promethazine-dextromethorphan (PROMETHAZINE-DM) 6.25-15 MG/5ML syrup; Take 5 mL by mouth 4 (Four) Times a Day As Needed for Cough.  Dispense: 180 mL; Refill: 0    4. Tobacco abuse  -     Varenicline Tartrate, Starter, 0.5 MG X 11 & 1 MG X 42 tablet therapy pack; Take 0.5 mg by mouth Daily for 3 days, THEN 0.5 mg 2 (Two) Times a Day for 4 days, THEN 1 mg 2 (Two) Times a Day for 21 days. Take 0.5 mg po daily x 3 days, then 0.5 mg po bid x 4 days, then 1 mg po bid  Dispense: 1 each; Refill: 0  -     varenicline (Chantix Continuing Month Uli) 1 MG tablet; Take 1 tablet by mouth 2 (Two) Times a Day for 56 days.  Dispense: 56 tablet; Refill: 1             Follow Up   Return if symptoms worsen or fail to improve.  Patient was given instructions and counseling regarding her condition or for health maintenance advice. Please  see specific information pulled into the AVS if appropriate.

## 2025-05-30 DIAGNOSIS — J44.9 CHRONIC OBSTRUCTIVE PULMONARY DISEASE, UNSPECIFIED COPD TYPE: ICD-10-CM

## 2025-05-30 DIAGNOSIS — R05.2 SUBACUTE COUGH: Primary | ICD-10-CM

## 2025-05-30 RX ORDER — BENZONATATE 100 MG/1
100 CAPSULE ORAL 3 TIMES DAILY PRN
Qty: 30 CAPSULE | Refills: 0 | Status: SHIPPED | OUTPATIENT
Start: 2025-05-30

## 2025-06-04 ENCOUNTER — PRIOR AUTHORIZATION (OUTPATIENT)
Dept: FAMILY MEDICINE CLINIC | Facility: CLINIC | Age: 35
End: 2025-06-04
Payer: MEDICAID

## 2025-06-04 NOTE — TELEPHONE ENCOUNTER
P.A. INITIATED TODAY,     Approved today by [place] [employer] 2017  The request has been approved. The authorization is effective from 06/04/2025 to 06/04/2026, as long as the member is enrolled in their current health plan.     MEMBER NOTIFIED BY [employer] MESSAGE

## 2025-06-25 ENCOUNTER — TELEPHONE (OUTPATIENT)
Dept: FAMILY MEDICINE CLINIC | Facility: CLINIC | Age: 35
End: 2025-06-25
Payer: MEDICAID

## 2025-06-25 DIAGNOSIS — R41.840 ATTENTION DEFICIT: Primary | ICD-10-CM

## 2025-06-25 NOTE — TELEPHONE ENCOUNTER
Please call patient and let her know I ordered referral to psychiatry.  Ask if she has had any trouble with anxiety or depression recently as this can also contribute to attention difficulties and trouble focusing.  If so, see if she would want to schedule an appointment with me sooner to discuss this.  Thank you.

## 2025-06-25 NOTE — TELEPHONE ENCOUNTER
"Hub staff attempted to follow warm transfer process and was unsuccessful     Caller: Isabella Christiansen \"Haley\"    Relationship to patient: Self    Best call back number:     [phone] (Mobile)       Patient is needing: PATIENT CALLED TO ASK IF DR. RUIZ COULD WRITE A PRESCRIPTION FOR ADD MEDICATIONS?    PLEASE CONTACT PATIENT TO ADVISE.         THANKS   "

## 2025-06-25 NOTE — TELEPHONE ENCOUNTER
I called and spoke with the patient and she said she was actually wondering if anyone in the office could prescribe ADD medicine. I let her know that our nurse practitioners could not prescribe this type of medication for her but that our providers technically could. I did let her know that even though they can prescribe it they may choose not to prescribe this medicine. I let her know that for long term some of the providers will take over but that if it is new they most likely would refer her out. She said she used to be on medicine a long time ago for this and she didn't need it anymore but that she has started back in school and feels like she does need it now. I asked her if she has talked with Aleena about this and she says she has so I asked her if she would like me to send a message to see if she would refer her somewhere. She verbalized she would like this so I let her know I would send a message to Aleena. She verbalized understanding with no further questions or concerns at this time and will call back if needed.

## 2025-06-26 NOTE — TELEPHONE ENCOUNTER
OKAY FOR HUB TO RELAY    I tried calling and speaking with the patient but did not get an answer and could not leave a voicemail because her mailbox was full. If she calls back:    Aleena wanted me to let you know she ordered referral to psychiatry. They should reach out to you within the next week and a half to two weeks to get you scheduled. If you do not hear from anyone by then let us know and we will see if we can get you what you need to get scheduled.  She also wanted me to ask if you have had any trouble with anxiety or depression recently as this can also contribute to attention difficulties and trouble focusing. She said if you have experienced this then you could schedule an appointment with her sooner to discuss this.      Please let us know if you have any other questions or concerns.

## 2025-07-18 ENCOUNTER — TELEPHONE (OUTPATIENT)
Dept: OBSTETRICS AND GYNECOLOGY | Facility: CLINIC | Age: 35
End: 2025-07-18
Payer: MEDICAID

## 2025-07-23 ENCOUNTER — TELEPHONE (OUTPATIENT)
Dept: OBSTETRICS AND GYNECOLOGY | Facility: CLINIC | Age: 35
End: 2025-07-23
Payer: MEDICAID

## 2025-07-23 NOTE — TELEPHONE ENCOUNTER
Spoke with pt she is aware of surgery details for 10-2 and is aware that she needs to come into the office to sign the medicaid hysterectomy consent form before 10-1.

## 2025-07-24 ENCOUNTER — TELEPHONE (OUTPATIENT)
Dept: OBSTETRICS AND GYNECOLOGY | Facility: CLINIC | Age: 35
End: 2025-07-24
Payer: MEDICAID

## 2025-07-24 NOTE — TELEPHONE ENCOUNTER
Spoke with Jeanna SOLORIO from [facility] updated Prior Auth dates since sx date has changed from Oct. To Sept 5th. Representative stated that she updated dates on her end and left comment. She stated that when we log into the portal you will see a date change. Reference # [id number].

## 2025-08-22 ENCOUNTER — PRE-ADMISSION TESTING (OUTPATIENT)
Dept: PREADMISSION TESTING | Facility: HOSPITAL | Age: 35
End: 2025-08-22
Payer: MEDICAID

## (undated) DEVICE — GLV SURG SIGNATURE ESSENTIAL PF LTX SZ7.5

## (undated) DEVICE — ANTIBACTERIAL UNDYED BRAIDED (POLYGLACTIN 910), SYNTHETIC ABSORBABLE SUTURE: Brand: COATED VICRYL

## (undated) DEVICE — SUT GUT CHRM 0 CT 27IN 914H

## (undated) DEVICE — KENDALL SCD EXPRESS SLEEVES, KNEE LENGTH, MEDIUM: Brand: KENDALL SCD

## (undated) DEVICE — ENSEAL 20 CM SHAFT, LARGE JAW: Brand: ENSEAL X1

## (undated) DEVICE — SUT ANTIBAC VICRYL/PLS ABS TIE COAT SZ3/0 12X18IN UD

## (undated) DEVICE — SUT VIC 0 CTX 36IN J978H

## (undated) DEVICE — SOL IRR H2O BTL 1000ML STRL

## (undated) DEVICE — SLV SCD CALF HEMOFORCE DVT THERP REPROC MD

## (undated) DEVICE — GLV SURG BIOGEL LTX PF 7 1/2

## (undated) DEVICE — SKIN PREP TRAY W/CHG: Brand: MEDLINE INDUSTRIES, INC.

## (undated) DEVICE — PK HYST ABD 40

## (undated) DEVICE — DRSNG SURESITE123 4X10IN

## (undated) DEVICE — SUT VIC 0 TIES 18IN J912G

## (undated) DEVICE — SUT VIC 0 CT 36IN J958H

## (undated) DEVICE — 3M™ TEGADERM™ TRANSPARENT FILM DRESSING FRAME STYLE, 1627, 4 IN X 10 IN (10 CM X 25 CM), 20/CT 4CT/CASE: Brand: 3M™ TEGADERM™

## (undated) DEVICE — 3M(TM) TEGADERM(TM) TRANSPARENT FILM DRESSING FRAME STYLE 1627: Brand: 3M™ TEGADERM™

## (undated) DEVICE — TOTAL TRAY, 16FR 10ML SIL FOLEY, URN: Brand: MEDLINE